# Patient Record
Sex: FEMALE | Race: WHITE | NOT HISPANIC OR LATINO | Employment: OTHER | URBAN - NONMETROPOLITAN AREA
[De-identification: names, ages, dates, MRNs, and addresses within clinical notes are randomized per-mention and may not be internally consistent; named-entity substitution may affect disease eponyms.]

---

## 2018-04-30 RX ORDER — DULOXETIN HYDROCHLORIDE 30 MG/1
30 CAPSULE, DELAYED RELEASE ORAL DAILY
COMMUNITY
End: 2022-09-26

## 2018-05-07 ENCOUNTER — TELEPHONE (OUTPATIENT)
Dept: VASCULAR SURGERY | Facility: CLINIC | Age: 73
End: 2018-05-07

## 2018-05-07 NOTE — TELEPHONE ENCOUNTER
Spoke with Mr Reese reminding him of Mrs Reese's appointment tomorrow at 1045 am with Dr Ruiz. Mr Reese confirmed she would be here.

## 2018-05-08 ENCOUNTER — OFFICE VISIT (OUTPATIENT)
Dept: VASCULAR SURGERY | Facility: CLINIC | Age: 73
End: 2018-05-08

## 2018-05-08 VITALS
SYSTOLIC BLOOD PRESSURE: 120 MMHG | BODY MASS INDEX: 22.44 KG/M2 | WEIGHT: 143 LBS | HEIGHT: 67 IN | HEART RATE: 55 BPM | DIASTOLIC BLOOD PRESSURE: 78 MMHG

## 2018-05-08 DIAGNOSIS — I87.2 VENOUS (PERIPHERAL) INSUFFICIENCY: Primary | ICD-10-CM

## 2018-05-08 DIAGNOSIS — M79.671 RIGHT FOOT PAIN: ICD-10-CM

## 2018-05-08 DIAGNOSIS — R25.2 LEG CRAMPS: ICD-10-CM

## 2018-05-08 PROCEDURE — 99203 OFFICE O/P NEW LOW 30 MIN: CPT | Performed by: SURGERY

## 2018-05-08 RX ORDER — ESTRADIOL 1 MG/1
1 TABLET ORAL
COMMUNITY
Start: 2017-09-20 | End: 2018-07-02 | Stop reason: SDUPTHER

## 2018-05-08 RX ORDER — ASPIRIN 81 MG/1
81 TABLET ORAL DAILY
COMMUNITY
End: 2023-01-11 | Stop reason: HOSPADM

## 2018-05-08 RX ORDER — ESTRADIOL 1 MG/1
1 TABLET ORAL DAILY
COMMUNITY
End: 2022-09-26

## 2018-05-08 RX ORDER — LEVOTHYROXINE SODIUM 0.07 MG/1
75 TABLET ORAL DAILY
COMMUNITY

## 2018-05-08 NOTE — PROGRESS NOTES
05/08/2018      Betsy Montes, APRN  [address] [address]    Marily Reese  1945    Chief Complaint   Patient presents with   • Lower Extremity Issue     Foot pain and foot drop to right foot.       Dear Betsy Roberson*:      HPI  I had the pleasure of seeing your patient Marily Reese in the office today.  Thank you kindly for this consultation.  As you recall, Marily Reese is a 72 y.o.  female who you are currently following for routine health maintenance.  She is having complaints of leg cramping and pain in her feet.  This has been present for five years.  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  She has always walked with a limp, but this has worsened.  She is complaining of foot pain on the right.  She does have complaints of cramping to her legs at night.    Past Medical History:   Diagnosis Date   • Anxiety    • Dementia    • Depression    • Foot pain, bilateral    • History of coma    • History of traumatic head injury    • Hypothyroidism    • Leg cramps    • Unsteadiness on feet        Past Surgical History:   Procedure Laterality Date   • CATARACT EXTRACTION     • EXPLORATORY LAPAROTOMY     • HYSTERECTOMY         Family History   Problem Relation Age of Onset   • Hypertension Mother    • Stroke Mother    • Coronary artery disease Mother        Social History     Social History   • Marital status:      Spouse name: N/A   • Number of children: N/A   • Years of education: N/A     Occupational History   • Not on file.     Social History Main Topics   • Smoking status: Never Smoker   • Smokeless tobacco: Never Used   • Alcohol use Yes      Comment: Occasional   • Drug use: No   • Sexual activity: Not on file     Other Topics Concern   • Not on file     Social History Narrative   • No narrative on file       Allergies   Allergen Reactions   • Demerol [Meperidine] Unknown (See Comments)     Unknown       Prior to Admission  "medications    Medication Sig Start Date End Date Taking? Authorizing Provider   aspirin 81 MG EC tablet Take 81 mg by mouth Daily.   Yes Historical Provider, MD   Calcium Carbonate-Vitamin D (CALCIUM-D PO) Take  by mouth 2 (Two) Times a Day.   Yes Historical Provider, MD   Cholecalciferol (VITAMIN D3) 2000 units chewable tablet Chew.   Yes Historical Provider, MD   DULoxetine (CYMBALTA) 30 MG capsule Take 30 mg by mouth Daily.   Yes Historical Provider, MD   estradiol (ESTRACE) 1 MG tablet Take 1 mg by mouth Daily.   Yes Historical Provider, MD   estradiol (ESTRACE) 1 MG tablet Take 1 mg by mouth. 9/20/17  Yes Historical Provider, MD   levothyroxine (SYNTHROID, LEVOTHROID) 75 MCG tablet Take 75 mcg by mouth Daily.   Yes Historical Provider, MD   NON FORMULARY 2 (Two) Times a Day. Colrys 0.6 mg  5/8/18 Yes Historical Provider, MD       Review of Systems   Constitutional: Negative.    HENT: Negative.    Eyes: Negative.    Respiratory: Negative.    Cardiovascular: Negative.         Varicose veins to bilateral lower extremities   Gastrointestinal: Negative.    Endocrine: Negative.    Genitourinary: Negative.    Musculoskeletal: Positive for gait problem.        Right ankle and foot pain   Skin: Negative.    Allergic/Immunologic: Negative.    Hematological: Negative.    Psychiatric/Behavioral: Negative.        /78   Pulse 55   Ht 170.2 cm (67\")   Wt 64.9 kg (143 lb)   BMI 22.40 kg/m²   Physical Exam   Constitutional: She is oriented to person, place, and time. She appears well-developed and well-nourished. No distress.   HENT:   Head: Normocephalic and atraumatic.   Mouth/Throat: Oropharynx is clear and moist.   Eyes: Pupils are equal, round, and reactive to light. No scleral icterus.   Neck: Normal range of motion. Neck supple. No JVD present. Carotid bruit is not present. No thyromegaly present.   Cardiovascular: Normal rate, regular rhythm, S2 normal, normal heart sounds, intact distal pulses and normal " pulses.  Exam reveals no gallop and no friction rub.    No murmur heard.  Varicose veins to bilateral lower extremities   Pulmonary/Chest: Effort normal and breath sounds normal.   Abdominal: Soft. Normal aorta and bowel sounds are normal. There is no hepatosplenomegaly.   Musculoskeletal: Normal range of motion.   Neurological: She is alert and oriented to person, place, and time. No cranial nerve deficit.   Skin: Skin is warm and dry. She is not diaphoretic.   Psychiatric: She has a normal mood and affect. Her behavior is normal. Judgment and thought content normal.   Nursing note and vitals reviewed.          Patient Active Problem List   Diagnosis   • Leg cramps         ICD-10-CM ICD-9-CM   1. Venous (peripheral) insufficiency I87.2 459.81   2. Right foot pain M79.671 729.5   3. Leg cramps R25.2 729.82           Plan: After thoroughly evaluating Marily Reese, I believe the best course of action is to Initially remain conservative from a vascular surgery standpoint.  She does have evidence of venous insufficiency in which I think we can help.  I did give her a prescription for compression stockings to wear on a daily basis.  We instructed her on how to wear them as well.  I will see her back in 3 months time with a 2 leg VVI to see if she is a candidate for a venous closure procedure which could help with some of her symptoms.  With regards to her ankle pain I would like for her to see Dr. Escobar for evaluation.  The patient can continue taking her current medication regimen as previously planned.  This was all discussed in full with complete understanding.    Thank you for allowing me to participate in the care of your patient.  Please do not hesitate with any questions or concerns.  I will keep you aware of any further encounters with Marily Reese.        Sincerely yours,         Mp Ruiz, DO    Louie Huntley, DO

## 2018-06-28 ENCOUNTER — TELEPHONE (OUTPATIENT)
Dept: PODIATRY | Facility: CLINIC | Age: 73
End: 2018-06-28

## 2018-06-28 NOTE — TELEPHONE ENCOUNTER
Called and left message with Mr. Reese reminding him of patient's appointment with Dr. Joey Escobar on 07/02/2018 at 9:45 am. Mr. Reese gave verbal confirmation of his wife's appointment at this time.

## 2018-07-02 ENCOUNTER — HOSPITAL ENCOUNTER (OUTPATIENT)
Dept: GENERAL RADIOLOGY | Facility: HOSPITAL | Age: 73
Discharge: HOME | End: 2018-07-02
Attending: PODIATRIST | Admitting: PODIATRIST

## 2018-07-02 ENCOUNTER — HOSPITAL ENCOUNTER (OUTPATIENT)
Dept: GENERAL RADIOLOGY | Facility: HOSPITAL | Age: 73
Discharge: HOME | End: 2018-07-02
Attending: PODIATRIST

## 2018-07-02 ENCOUNTER — OFFICE VISIT (OUTPATIENT)
Dept: PODIATRY | Facility: CLINIC | Age: 73
End: 2018-07-02

## 2018-07-02 VITALS
DIASTOLIC BLOOD PRESSURE: 60 MMHG | HEART RATE: 78 BPM | HEIGHT: 67 IN | WEIGHT: 143 LBS | SYSTOLIC BLOOD PRESSURE: 114 MMHG | OXYGEN SATURATION: 92 % | BODY MASS INDEX: 22.44 KG/M2

## 2018-07-02 DIAGNOSIS — M79.671 FOOT PAIN, RIGHT: ICD-10-CM

## 2018-07-02 DIAGNOSIS — M21.371 FOOT DROP, RIGHT: Primary | ICD-10-CM

## 2018-07-02 PROCEDURE — 73610 X-RAY EXAM OF ANKLE: CPT

## 2018-07-02 PROCEDURE — 99203 OFFICE O/P NEW LOW 30 MIN: CPT | Performed by: PODIATRIST

## 2018-07-02 PROCEDURE — 73630 X-RAY EXAM OF FOOT: CPT

## 2018-07-03 ENCOUNTER — TELEPHONE (OUTPATIENT)
Dept: PODIATRY | Facility: CLINIC | Age: 73
End: 2018-07-03

## 2018-07-03 NOTE — TELEPHONE ENCOUNTER
Called and discussed XR results with Ms. Marily Reese. Patient gave verbal understanding at this time and was reminded to call the office if pain and/or symptoms worsen or fail to improve. Reminded patient of appointment and testing next month with Saida FLOREZ.

## 2018-08-08 ENCOUNTER — OFFICE VISIT (OUTPATIENT)
Dept: VASCULAR SURGERY | Facility: CLINIC | Age: 73
End: 2018-08-08

## 2018-08-08 ENCOUNTER — HOSPITAL ENCOUNTER (OUTPATIENT)
Dept: ULTRASOUND IMAGING | Facility: HOSPITAL | Age: 73
Discharge: HOME | End: 2018-08-08
Admitting: NURSE PRACTITIONER

## 2018-08-08 VITALS
DIASTOLIC BLOOD PRESSURE: 78 MMHG | SYSTOLIC BLOOD PRESSURE: 108 MMHG | HEART RATE: 86 BPM | HEIGHT: 68 IN | BODY MASS INDEX: 21.82 KG/M2 | WEIGHT: 144 LBS

## 2018-08-08 DIAGNOSIS — Z79.02 ENCOUNTER FOR MONITORING ANTIPLATELET THERAPY: ICD-10-CM

## 2018-08-08 DIAGNOSIS — Z01.818 PREOP TESTING: ICD-10-CM

## 2018-08-08 DIAGNOSIS — R25.2 LEG CRAMPS: ICD-10-CM

## 2018-08-08 DIAGNOSIS — I87.2 VENOUS INSUFFICIENCY: Primary | ICD-10-CM

## 2018-08-08 DIAGNOSIS — I87.2 VENOUS (PERIPHERAL) INSUFFICIENCY: ICD-10-CM

## 2018-08-08 DIAGNOSIS — Z51.81 ENCOUNTER FOR MONITORING ANTIPLATELET THERAPY: ICD-10-CM

## 2018-08-08 PROCEDURE — 93970 EXTREMITY STUDY: CPT | Performed by: SURGERY

## 2018-08-08 PROCEDURE — 99213 OFFICE O/P EST LOW 20 MIN: CPT | Performed by: NURSE PRACTITIONER

## 2018-08-08 PROCEDURE — 93970 EXTREMITY STUDY: CPT

## 2018-08-08 RX ORDER — COLCHICINE 0.6 MG/1
0.6 TABLET ORAL 2 TIMES DAILY
COMMUNITY
Start: 2015-06-07 | End: 2022-09-26

## 2018-08-08 RX ORDER — DONEPEZIL HYDROCHLORIDE 5 MG/1
TABLET, FILM COATED ORAL
COMMUNITY
Start: 2018-06-18 | End: 2018-10-10

## 2018-08-08 NOTE — PROGRESS NOTES
"08/08/2018       Louie Huntley DO  [facility]   [place] [address]    Marily Reese  1945    Chief Complaint   Patient presents with   • Follow-up     Follow up for VVI results.Complaint of leg cramps.       Dear Louie Huntley DO        HPI  I had the pleasure of seeing your patient Marily Reese in the office today.  As you recall, Marily Reese is a 73 y.o.  female who you are currently following for routine health maintenance.  She is having complaints of leg cramping and pain in her feet.  This has been present for five years.  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  She has always walked with a limp, but this has worsened.  She is complaining of foot pain on the right.  She does have complaints of cramping to her legs at night.  She did have noninvasive testing performed today, which I did review in office.      Review of Systems   Constitutional: Negative.    HENT: Negative.    Eyes: Negative.    Respiratory: Negative.    Cardiovascular: Negative.         Varicose veins to bilateral lower extremities   Gastrointestinal: Negative.    Endocrine: Negative.    Genitourinary: Negative.    Musculoskeletal: Positive for gait problem.        Right ankle and foot pain   Skin: Negative.    Allergic/Immunologic: Negative.    Hematological: Negative.    Psychiatric/Behavioral: Negative.        /78   Pulse 86   Ht 171.5 cm (67.5\")   Wt 65.3 kg (144 lb)   BMI 22.22 kg/m²   Physical Exam   Constitutional: She is oriented to person, place, and time. She appears well-developed and well-nourished. No distress.   HENT:   Head: Normocephalic and atraumatic.   Mouth/Throat: Oropharynx is clear and moist.   Eyes: Pupils are equal, round, and reactive to light. No scleral icterus.   Neck: Normal range of motion. Neck supple. No JVD present. Carotid bruit is not present. No thyromegaly present.   Cardiovascular: Normal rate, regular rhythm, S2 normal, normal " heart sounds, intact distal pulses and normal pulses.  Exam reveals no gallop and no friction rub.    No murmur heard.  Varicose veins to bilateral lower extremities   Pulmonary/Chest: Effort normal and breath sounds normal.   Abdominal: Soft. Normal aorta and bowel sounds are normal. There is no hepatosplenomegaly.   Musculoskeletal: Normal range of motion.   Neurological: She is alert and oriented to person, place, and time. No cranial nerve deficit.   Skin: Skin is warm and dry. She is not diaphoretic.   Psychiatric: She has a normal mood and affect. Her behavior is normal. Judgment and thought content normal.   Nursing note and vitals reviewed.    Us Venous Doppler Lower Extremity Bilateral (duplex)    Result Date: 8/8/2018  Narrative: History: Swelling   Comments: Venous valvular insufficiency testing was performed in both legs using duplex ultrasound with rapid cuff deflation technique.  The common femoral veins, popliteal veins, posterior tibial veins, greater saphenous veins, and lesser saphenous veins were interrogated bilaterally.  On the right, the greater saphenous vein at the junction measured 5 mm. In the mid thigh measured 3.6 cm. Above the knee measured 3.3 mm. In the mid calf measured 2.5 mm. At the ankle measured 2.9 mm. There is greater than 0.5 seconds of reflux from the proximal thigh to the ankle. The lesser saphenous vein is within normal limits and no evidence of reflux. There is no evidence of DVT.  On the left, the greater saphenous vein at the junction measured 2.9 mm. In the mid thigh measured 2.8 mm. Above the knee measured 2.2 mm. In the mid calf measured 1.9 mm. At the ankle measured 2 mm. There is greater than 0.5 seconds of reflux at the ankle only. Lesser saphenous vein is within normal limits and no evidence of reflux. There is no evidence of DVT.      Impression: Impression: There is evidence of venous insufficiency in both lower extremity greater saphenous veins right greater  than left.    This report was finalized on 08/08/2018 17:19 by Dr. Mp Ruiz MD.           Patient Active Problem List   Diagnosis   • Leg cramps   • Foot drop, right         ICD-10-CM ICD-9-CM   1. Venous insufficiency I87.2 459.81   2. Leg cramps R25.2 729.82         Plan: After thoroughly evaluating Marily Reese, I believe the best course of action is to proceed with a radiofrequency ablation of the right greater saphenous vein.  Risks of radiofrequency ablation include, but are not limited to, bleeding, infection, vessel damage, nerve damage, DVT, phlebitis, and pulmonary embolus.  The patient understands these risks and wishes to proceed with procedure.  I would like her to continue with the compression stockings  on a daily basis.       The patient can continue taking her current medication regimen as previously planned.  This was all discussed in full with complete understanding.    Thank you for allowing me to participate in the care of your patient.  Please do not hesitate with any questions or concerns.  I will keep you aware of any further encounters with Marily Reese.        Sincerely yours,         Saida Reddy, VIKKI Huntley, Louie Broderick, DO

## 2018-08-09 ENCOUNTER — TELEPHONE (OUTPATIENT)
Dept: VASCULAR SURGERY | Facility: CLINIC | Age: 73
End: 2018-08-09

## 2018-08-09 PROBLEM — Z01.818 PREOP TESTING: Status: ACTIVE | Noted: 2018-08-09

## 2018-08-09 PROBLEM — I87.2 VENOUS INSUFFICIENCY: Status: ACTIVE | Noted: 2018-08-09

## 2018-08-09 NOTE — TELEPHONE ENCOUNTER
I called patient to give her surgical instructions. Her pre work is scheduled for 8/17/18 with  An arrival time of 9:45 am. Her procedure is scheduled for 8/24/18 with an arrival time of 6 am.

## 2018-08-09 NOTE — H&P
08/08/2018       Louie Huntley DO  [facility]   [place] [address]    Marily Reese  1945    Chief Complaint   Patient presents with   • Follow-up     Follow up for VVI results.Complaint of leg cramps.       Dear Louie Huntley,         HPI  I had the pleasure of seeing your patient Marily Reese in the office today.  As you recall, Marily Reese is a 73 y.o.  female who you are currently following for routine health maintenance.  She is having complaints of leg cramping and pain in her feet.  This has been present for five years.  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  She has always walked with a limp, but this has worsened.  She is complaining of foot pain on the right.  She does have complaints of cramping to her legs at night.  She did have noninvasive testing performed today, which I did review in office.    Past Medical History:   Diagnosis Date   • Anxiety    • Dementia    • Depression    • Foot pain, bilateral    • History of coma    • History of traumatic head injury    • Hypothyroidism    • Leg cramps    • Unsteadiness on feet      Past Surgical History:   Procedure Laterality Date   • CATARACT EXTRACTION     • EXPLORATORY LAPAROTOMY     • HYSTERECTOMY       Family History   Problem Relation Age of Onset   • Hypertension Mother    • Stroke Mother    • Coronary artery disease Mother      Social History   Substance Use Topics   • Smoking status: Never Smoker   • Smokeless tobacco: Never Used   • Alcohol use Yes      Comment: Occasional     Allergies   Allergen Reactions   • Demerol [Meperidine] Unknown (See Comments)     Unknown       Current Outpatient Prescriptions:   •  aspirin 81 MG EC tablet, Take 81 mg by mouth Daily., Disp: , Rfl:   •  Calcium Carbonate-Vitamin D (CALCIUM-D PO), Take  by mouth 2 (Two) Times a Day., Disp: , Rfl:   •  Cholecalciferol (VITAMIN D3) 2000 units chewable tablet, Chew., Disp: , Rfl:   •  colchicine (COLCRYS)  "0.6 MG tablet, , Disp: , Rfl:   •  donepezil (ARICEPT) 5 MG tablet, , Disp: , Rfl:   •  DULoxetine (CYMBALTA) 30 MG capsule, Take 30 mg by mouth Daily., Disp: , Rfl:   •  estradiol (ESTRACE) 1 MG tablet, Take 1 mg by mouth Daily., Disp: , Rfl:   •  levothyroxine (SYNTHROID, LEVOTHROID) 75 MCG tablet, Take 75 mcg by mouth Daily., Disp: , Rfl:       Review of Systems   Constitutional: Negative.    HENT: Negative.    Eyes: Negative.    Respiratory: Negative.    Cardiovascular: Negative.         Varicose veins to bilateral lower extremities   Gastrointestinal: Negative.    Endocrine: Negative.    Genitourinary: Negative.    Musculoskeletal: Positive for gait problem.        Right ankle and foot pain   Skin: Negative.    Allergic/Immunologic: Negative.    Hematological: Negative.    Psychiatric/Behavioral: Negative.        /78   Pulse 86   Ht 171.5 cm (67.5\")   Wt 65.3 kg (144 lb)   BMI 22.22 kg/m²   Physical Exam   Constitutional: She is oriented to person, place, and time. She appears well-developed and well-nourished. No distress.   HENT:   Head: Normocephalic and atraumatic.   Mouth/Throat: Oropharynx is clear and moist.   Eyes: Pupils are equal, round, and reactive to light. No scleral icterus.   Neck: Normal range of motion. Neck supple. No JVD present. Carotid bruit is not present. No thyromegaly present.   Cardiovascular: Normal rate, regular rhythm, S2 normal, normal heart sounds, intact distal pulses and normal pulses.  Exam reveals no gallop and no friction rub.    No murmur heard.  Varicose veins to bilateral lower extremities   Pulmonary/Chest: Effort normal and breath sounds normal.   Abdominal: Soft. Normal aorta and bowel sounds are normal. There is no hepatosplenomegaly.   Musculoskeletal: Normal range of motion.   Neurological: She is alert and oriented to person, place, and time. No cranial nerve deficit.   Skin: Skin is warm and dry. She is not diaphoretic.   Psychiatric: She has a normal " mood and affect. Her behavior is normal. Judgment and thought content normal.   Nursing note and vitals reviewed.    Us Venous Doppler Lower Extremity Bilateral (duplex)    Result Date: 8/8/2018  Narrative: History: Swelling   Comments: Venous valvular insufficiency testing was performed in both legs using duplex ultrasound with rapid cuff deflation technique.  The common femoral veins, popliteal veins, posterior tibial veins, greater saphenous veins, and lesser saphenous veins were interrogated bilaterally.  On the right, the greater saphenous vein at the junction measured 5 mm. In the mid thigh measured 3.6 cm. Above the knee measured 3.3 mm. In the mid calf measured 2.5 mm. At the ankle measured 2.9 mm. There is greater than 0.5 seconds of reflux from the proximal thigh to the ankle. The lesser saphenous vein is within normal limits and no evidence of reflux. There is no evidence of DVT.  On the left, the greater saphenous vein at the junction measured 2.9 mm. In the mid thigh measured 2.8 mm. Above the knee measured 2.2 mm. In the mid calf measured 1.9 mm. At the ankle measured 2 mm. There is greater than 0.5 seconds of reflux at the ankle only. Lesser saphenous vein is within normal limits and no evidence of reflux. There is no evidence of DVT.      Impression: Impression: There is evidence of venous insufficiency in both lower extremity greater saphenous veins right greater than left.    This report was finalized on 08/08/2018 17:19 by Dr. Mp Ruiz MD.           Patient Active Problem List   Diagnosis   • Leg cramps   • Foot drop, right         ICD-10-CM ICD-9-CM   1. Venous insufficiency I87.2 459.81   2. Leg cramps R25.2 729.82         Plan: After thoroughly evaluating Marily Reese, I believe the best course of action is to proceed with a radiofrequency ablation of the right greater saphenous vein.  Risks of radiofrequency ablation include, but are not limited to, bleeding, infection, vessel  damage, nerve damage, DVT, phlebitis, and pulmonary embolus.  The patient understands these risks and wishes to proceed with procedure.  I would like her to continue with the compression stockings  on a daily basis.       The patient can continue taking her current medication regimen as previously planned.  This was all discussed in full with complete understanding.    Thank you for allowing me to participate in the care of your patient.  Please do not hesitate with any questions or concerns.  I will keep you aware of any further encounters with Marily Reese.        Sincerely yours,         Saida Reddy, VIKKI Huntley, Louie Broderick, DO

## 2018-08-17 ENCOUNTER — APPOINTMENT (OUTPATIENT)
Dept: PREADMISSION TESTING | Facility: HOSPITAL | Age: 73
End: 2018-08-17

## 2018-08-22 ENCOUNTER — TELEPHONE (OUTPATIENT)
Dept: VASCULAR SURGERY | Facility: CLINIC | Age: 73
End: 2018-08-22
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
## 2018-10-01 ENCOUNTER — TELEPHONE (OUTPATIENT)
Dept: VASCULAR SURGERY | Facility: CLINIC | Age: 73
End: 2018-10-01

## 2018-10-01 NOTE — TELEPHONE ENCOUNTER
Spoke with Ms Reese reminding her of her appointment for Tuesday, October 2nd, 2018 at 1045 am with Dr Mp Ruiz. Ms Reese confirmed she would be here.

## 2018-10-02 ENCOUNTER — OFFICE VISIT (OUTPATIENT)
Dept: VASCULAR SURGERY | Facility: CLINIC | Age: 73
End: 2018-10-02

## 2018-10-02 VITALS
HEART RATE: 68 BPM | SYSTOLIC BLOOD PRESSURE: 122 MMHG | OXYGEN SATURATION: 96 % | BODY MASS INDEX: 22.28 KG/M2 | HEIGHT: 68 IN | WEIGHT: 147 LBS | DIASTOLIC BLOOD PRESSURE: 68 MMHG

## 2018-10-02 DIAGNOSIS — R25.2 LEG CRAMPS: ICD-10-CM

## 2018-10-02 DIAGNOSIS — Z01.818 PREOP TESTING: ICD-10-CM

## 2018-10-02 DIAGNOSIS — Z79.02 ENCOUNTER FOR MONITORING ANTIPLATELET THERAPY: ICD-10-CM

## 2018-10-02 DIAGNOSIS — I87.2 VENOUS INSUFFICIENCY: Primary | ICD-10-CM

## 2018-10-02 DIAGNOSIS — Z51.81 ENCOUNTER FOR MONITORING ANTIPLATELET THERAPY: ICD-10-CM

## 2018-10-02 PROCEDURE — 99213 OFFICE O/P EST LOW 20 MIN: CPT | Performed by: NURSE PRACTITIONER
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
## 2018-10-10 ENCOUNTER — APPOINTMENT (OUTPATIENT)
Dept: PREADMISSION TESTING | Facility: HOSPITAL | Age: 73
End: 2018-10-10

## 2018-10-10 ENCOUNTER — HOSPITAL ENCOUNTER (OUTPATIENT)
Dept: GENERAL RADIOLOGY | Facility: HOSPITAL | Age: 73
Discharge: HOME | End: 2018-10-10
Admitting: NURSE PRACTITIONER

## 2018-10-10 VITALS
DIASTOLIC BLOOD PRESSURE: 56 MMHG | OXYGEN SATURATION: 94 % | WEIGHT: 149.25 LBS | SYSTOLIC BLOOD PRESSURE: 115 MMHG | BODY MASS INDEX: 22.62 KG/M2 | HEART RATE: 77 BPM | RESPIRATION RATE: 16 BRPM | HEIGHT: 68 IN

## 2018-10-10 DIAGNOSIS — Z01.818 PREOP TESTING: ICD-10-CM

## 2018-10-10 DIAGNOSIS — Z51.81 ENCOUNTER FOR MONITORING ANTIPLATELET THERAPY: ICD-10-CM

## 2018-10-10 DIAGNOSIS — I87.2 VENOUS INSUFFICIENCY: ICD-10-CM

## 2018-10-10 DIAGNOSIS — Z79.02 ENCOUNTER FOR MONITORING ANTIPLATELET THERAPY: ICD-10-CM

## 2018-10-10 LAB
ANION GAP SERPL CALCULATED.3IONS-SCNC: 9 MMOL/L (ref 4–13)
APTT PPP: 28.9 SECONDS (ref 24.1–34.8)
BASOPHILS # BLD AUTO: 0.05 10*3/MM3 (ref 0–0.2)
BASOPHILS NFR BLD AUTO: 1.2 % (ref 0–2)
BUN BLD-MCNC: 9 MG/DL (ref 5–21)
BUN/CREAT SERPL: 14.1 (ref 7–25)
CALCIUM SPEC-SCNC: 9.1 MG/DL (ref 8.4–10.4)
CHLORIDE SERPL-SCNC: 103 MMOL/L (ref 98–110)
CO2 SERPL-SCNC: 30 MMOL/L (ref 24–31)
CREAT BLD-MCNC: 0.64 MG/DL (ref 0.5–1.4)
DEPRECATED RDW RBC AUTO: 45.1 FL (ref 40–54)
EOSINOPHIL # BLD AUTO: 0.21 10*3/MM3 (ref 0–0.7)
EOSINOPHIL NFR BLD AUTO: 5 % (ref 0–4)
ERYTHROCYTE [DISTWIDTH] IN BLOOD BY AUTOMATED COUNT: 13.2 % (ref 12–15)
GFR SERPL CREATININE-BSD FRML MDRD: 91 ML/MIN/1.73
GLUCOSE BLD-MCNC: 109 MG/DL (ref 70–100)
HCT VFR BLD AUTO: 34.9 % (ref 37–47)
HGB BLD-MCNC: 11.5 G/DL (ref 12–16)
INR PPP: 1.07 (ref 0.91–1.09)
LYMPHOCYTES # BLD AUTO: 1.96 10*3/MM3 (ref 0.72–4.86)
LYMPHOCYTES NFR BLD AUTO: 46.3 % (ref 15–45)
MCH RBC QN AUTO: 30.6 PG (ref 28–32)
MCHC RBC AUTO-ENTMCNC: 33 G/DL (ref 33–36)
MCV RBC AUTO: 92.8 FL (ref 82–98)
MONOCYTES # BLD AUTO: 0.32 10*3/MM3 (ref 0.19–1.3)
MONOCYTES NFR BLD AUTO: 7.6 % (ref 4–12)
NEUTROPHILS # BLD AUTO: 1.68 10*3/MM3 (ref 1.87–8.4)
NEUTROPHILS NFR BLD AUTO: 39.7 % (ref 39–78)
PLATELET # BLD AUTO: 106 10*3/MM3 (ref 130–400)
PMV BLD AUTO: 13.2 FL (ref 6–12)
POTASSIUM BLD-SCNC: 3.9 MMOL/L (ref 3.5–5.3)
PROTHROMBIN TIME: 14.2 SECONDS (ref 11.9–14.6)
RBC # BLD AUTO: 3.76 10*6/MM3 (ref 4.2–5.4)
SODIUM BLD-SCNC: 142 MMOL/L (ref 135–145)
WBC NRBC COR # BLD: 4.23 10*3/MM3 (ref 4.8–10.8)

## 2018-10-10 PROCEDURE — 71046 X-RAY EXAM CHEST 2 VIEWS: CPT

## 2018-10-10 PROCEDURE — 93010 ELECTROCARDIOGRAM REPORT: CPT | Performed by: INTERNAL MEDICINE

## 2018-10-10 PROCEDURE — 85730 THROMBOPLASTIN TIME PARTIAL: CPT | Performed by: NURSE PRACTITIONER

## 2018-10-10 PROCEDURE — 85025 COMPLETE CBC W/AUTO DIFF WBC: CPT | Performed by: NURSE PRACTITIONER

## 2018-10-10 PROCEDURE — 36415 COLL VENOUS BLD VENIPUNCTURE: CPT

## 2018-10-10 PROCEDURE — 93005 ELECTROCARDIOGRAM TRACING: CPT

## 2018-10-10 PROCEDURE — 80048 BASIC METABOLIC PNL TOTAL CA: CPT | Performed by: NURSE PRACTITIONER

## 2018-10-10 PROCEDURE — 85610 PROTHROMBIN TIME: CPT | Performed by: NURSE PRACTITIONER

## 2018-10-10 NOTE — DISCHARGE INSTRUCTIONS
DAY OF SURGERY INSTRUCTIONS        YOUR SURGEON: TARI MOORE    PROCEDURE: RIGHT SAPHENOUS VEIN RADIO FREQUENCY ABLATION    DATE OF SURGERY: October 17, 2018    ARRIVAL TIME: AS DIRECTED BY OFFICE    DAY OF SURGERY TAKE ONLY THESE MEDICATIONS UNLESS OTHERWISE INSTRUCTED BY YOUR PHYSICIAN: NONE        MANAGING PAIN AFTER SURGERY    We know you are probably wondering what your pain will be like after surgery.  Following surgery it is unrealistic to expect you will not have pain.   Pain is how our bodies let us know that something is wrong or cautions us to be careful.  That said, our goal is to make your pain tolerable.    Methods we may use to treat your pain include (oral or IV medications, PCAs, epidurals, nerve blocks, etc.)   While some procedures require IV pain medications for a short time after surgery, transitioning to pain medications by mouth allows for better management of pain.   Your nurse will encourage you to take oral pain medications whenever possible.  IV medications work almost immediately, but only last a short while.  Taking medications by mouth allows for a more constant level of medication in your blood stream for a longer period of time.      Once your pain is out of control it is harder to get back under control.  It is important you are aware when your next dose of pain medication is due.  If you are admitted, your nurse may write the time of your next dose on the white board in your room to help you remember.      We are interested in your pain and encourage you to inform us about aggravating factors during your visit.   Many times a simple repositioning every few hours can make a big difference.    If your physician says it is okay, do not let your pain prevent you from getting out of bed. Be sure to call your nurse for assistance prior to getting up so you do not fall.      Before surgery, please decide your tolerable pain goal.  These faces help describe the pain ratings we use on  a 0-10 scale.   Be prepared to tell us your goal and whether or not you take pain or anxiety medications at home.          BEFORE YOU COME TO THE HOSPITAL  (Pre-op instructions)  • Do not eat, drink, smoke or chew gum after midnight the night before surgery.  This also includes no mints.  • Morning of surgery take only the medicines you have been instructed with a sip of water unless otherwise instructed  by your physician.  • Do not shave, wear makeup or dark nail polish.  • Remove all jewelry including rings.  • Leave anything you consider valuable at home.  • Leave your suitcase in the car until after your surgery.  • Bring the following with you if applicable:  o Picture ID and insurance, Medicare or Medicaid cards  o Co-pay/deductible required by insurance (cash, check, credit card)  o Copy of advance directive, living will or power-of- documents if not brought to PAT  o CPAP or BIPAP mask and tubing  o Relaxation aids (MP3 player, book, magazine)  • On the day of surgery check in at registration located at the main entrance of the hospital.       Outpatient Surgery Guidelines, Adult  Outpatient procedures are those for which the person having the procedure is allowed to go home the same day as the procedure. Various procedures are done on an outpatient basis. You should follow some general guidelines if you will be having an outpatient procedure.  LET YOUR HEALTH CARE PROVIDER KNOW ABOUT:  · Any allergies you have.  · All medicines you are taking, including vitamins, herbs, eye drops, creams, and over-the-counter medicines.  · Previous problems you or members of your family have had with the use of anesthetics.  · Any blood disorders you have.  · Previous surgeries you have had.  · Medical conditions you have.  RISKS AND COMPLICATIONS  Your health care provider will discuss possible risks and complications with you before surgery. Common risks and complications include:    · Problems due to the use of  anesthetics.  · Blood loss and replacement (does not apply to minor surgical procedures).  · Temporary increase in pain due to surgery.  · Uncorrected pain or problems that the surgery was meant to correct.  · Infection.  · New damage.  BEFORE THE PROCEDURE  · Ask your health care provider about changing or stopping your regular medicines. You may need to stop taking certain medicines in the days or weeks before the procedure.  · Stop smoking at least 2 weeks before surgery. This lowers your risk for complications during and after surgery. Ask your health care provider for help with this if needed.  · Eat your usual meals and a light supper the day before surgery. Continue fluid intake. Do not drink alcohol.  · Do not eat or drink after midnight the night before your surgery.   · Arrange for someone to take you home and to stay with you for 24 hours after the procedure. Medicine given for your procedure may affect your ability to drive or to care for yourself.  · Call your health care provider's office if you develop an illness or problem that may prevent you from safely having your procedure.  AFTER THE PROCEDURE  After surgery, you will be taken to a recovery area, where your progress will be monitored. If there are no complications, you will be allowed to go home when you are awake, stable, and taking fluids well. You may have numbness around the surgical site. Healing will take some time. You will have tenderness at the surgical site and may have some swelling and bruising. You may also have some nausea.  HOME CARE INSTRUCTIONS  · Do not drive for 24 hours, or as directed by your health care provider. Do not drive while taking prescription pain medicines.  · Do not drink alcohol for 24 hours.  · Do not make important decisions or sign legal documents for 24 hours.  · You may resume a normal diet and activities as directed.  · Do not lift anything heavier than 10 pounds (4.5 kg) or play contact sports until your  health care provider says it is okay.  · Change your bandages (dressings) as directed.  · Only take over-the-counter or prescription medicines as directed by your health care provider.  · Follow up with your health care provider as directed.  SEEK MEDICAL CARE IF:  · You have increased bleeding (more than a small spot) from the surgical site.  · You have redness, swelling, or increasing pain in the wound.  · You see pus coming from the wound.  · You have a fever.  · You notice a bad smell coming from the wound or dressing.  · You feel lightheaded or faint.  · You develop a rash.  · You have trouble breathing.  · You develop allergies.  MAKE SURE YOU:  · Understand these instructions.  · Will watch your condition.  · Will get help right away if you are not doing well or get worse.     This information is not intended to replace advice given to you by your health care provider. Make sure you discuss any questions you have with your health care provider.     Document Released: 09/12/2002 Document Revised: 05/03/2016 Document Reviewed: 05/22/2014  [employer] Interactive Patient Education ©2016 [employer]       Fall Prevention in Hospitals, Adult  As a hospital patient, your condition and the treatments you receive can increase your risk for falls. Some additional risk factors for falls in a hospital include:  · Being in an unfamiliar environment.  · Being on bed rest.  · Your surgery.  · Taking certain medicines.  · Your tubing requirements, such as intravenous (IV) therapy or catheters.  It is important that you learn how to decrease fall risks while at the hospital. Below are important tips that can help prevent falls.  SAFETY TIPS FOR PREVENTING FALLS  Talk about your risk of falling.  · Ask your health care provider why you are at risk for falling. Is it your medicine, illness, tubing placement, or something else?  · Make a plan with your health care provider to keep you safe from falls.  · Ask your health care  provider or pharmacist about side effects of your medicines. Some medicines can make you dizzy or affect your coordination.  Ask for help.  · Ask for help before getting out of bed. You may need to press your call button.  · Ask for assistance in getting safely to the toilet.  · Ask for a walker or cane to be put at your bedside. Ask that most of the side rails on your bed be placed up before your health care provider leaves the room.  · Ask family or friends to sit with you.  · Ask for things that are out of your reach, such as your glasses, hearing aids, telephone, bedside table, or call button.  Follow these tips to avoid falling:  · Stay lying or seated, rather than standing, while waiting for help.  · Wear rubber-soled slippers or shoes whenever you walk in the hospital.  · Avoid quick, sudden movements.  ¨ Change positions slowly.  ¨ Sit on the side of your bed before standing.  ¨ Stand up slowly and wait before you start to walk.  · Let your health care provider know if there is a spill on the floor.  · Pay careful attention to the medical equipment, electrical cords, and tubes around you.  · When you need help, use your call button by your bed or in the bathroom. Wait for one of your health care providers to help you.  · If you feel dizzy or unsure of your footing, return to bed and wait for assistance.  · Avoid being distracted by the TV, telephone, or another person in your room.  · Do not lean or support yourself on rolling objects, such as IV poles or bedside tables.     This information is not intended to replace advice given to you by your health care provider. Make sure you discuss any questions you have with your health care provider.     Document Released: 12/15/2001 Document Revised: 01/08/2016 Document Reviewed: 08/25/2013  [employer] Interactive Patient Education ©2016 [employer]       Surgical Site Infections FAQs  What is a Surgical Site Infection (SSI)?  A surgical site infection is an  infection that occurs after surgery in the part of the body where the surgery took place. Most patients who have surgery do not develop an infection. However, infections develop in about 1 to 3 out of every 100 patients who have surgery.  Some of the common symptoms of a surgical site infection are:  · Redness and pain around the area where you had surgery  · Drainage of cloudy fluid from your surgical wound  · Fever  Can SSIs be treated?  Yes. Most surgical site infections can be treated with antibiotics. The antibiotic given to you depends on the bacteria (germs) causing the infection. Sometimes patients with SSIs also need another surgery to treat the infection.  What are some of the things that hospitals are doing to prevent SSIs?  To prevent SSIs, doctors, nurses, and other healthcare providers:  · Clean their hands and arms up to their elbows with an antiseptic agent just before the surgery.  · Clean their hands with soap and water or an alcohol-based hand rub before and after caring for each patient.  · May remove some of your hair immediately before your surgery using electric clippers if the hair is in the same area where the procedure will occur. They should not shave you with a razor.  · Wear special hair covers, masks, gowns, and gloves during surgery to keep the surgery area clean.  · Give you antibiotics before your surgery starts. In most cases, you should get antibiotics within 60 minutes before the surgery starts and the antibiotics should be stopped within 24 hours after surgery.  · Clean the skin at the site of your surgery with a special soap that kills germs.  What can I do to help prevent SSIs?  Before your surgery:  · Tell your doctor about other medical problems you may have. Health problems such as allergies, diabetes, and obesity could affect your surgery and your treatment.  · Quit smoking. Patients who smoke get more infections. Talk to your doctor about how you can quit before your  surgery.  · Do not shave near where you will have surgery. Shaving with a razor can irritate your skin and make it easier to develop an infection.  At the time of your surgery:  · Speak up if someone tries to shave you with a razor before surgery. Ask why you need to be shaved and talk with your surgeon if you have any concerns.  · Ask if you will get antibiotics before surgery.  After your surgery:  · Make sure that your healthcare providers clean their hands before examining you, either with soap and water or an alcohol-based hand rub.  · If you do not see your providers clean their hands, please ask them to do so.  · Family and friends who visit you should not touch the surgical wound or dressings.  · Family and friends should clean their hands with soap and water or an alcohol-based hand rub before and after visiting you. If you do not see them clean their hands, ask them to clean their hands.  What do I need to do when I go home from the hospital?  · Before you go home, your doctor or nurse should explain everything you need to know about taking care of your wound. Make sure you understand how to care for your wound before you leave the hospital.  · Always clean your hands before and after caring for your wound.  · Before you go home, make sure you know who to contact if you have questions or problems after you get home.  · If you have any symptoms of an infection, such as redness and pain at the surgery site, drainage, or fever, call your doctor immediately.  If you have additional questions, please ask your doctor or nurse.  Developed and co-sponsored by The Society for Healthcare Epidemiology of [place] (SHEA); Infectious Diseases Society of [place] (IDSA); American Hospital Association; Association for Professionals in Infection Control and Epidemiology (APIC); Centers for Disease Control and Prevention (CDC); and The Joint Commission.     This information is not intended to replace advice given to you by  your health care provider. Make sure you discuss any questions you have with your health care provider.     Document Released: 12/23/2014 Document Revised: 01/08/2016 Document Reviewed: 03/02/2016  [employer] Interactive Patient Education ©2016 [employer]       [facility]  CHG 4% Patient Instruction Sheet    Preparing the Skin Before Surgery  Preparing or “prepping” skin before surgery can reduce the risk of infection at the surgical site. To make the process easier,[facility] has chosen 4% Chlorhexidine Gluconate (CHG) antiseptic solution.   The steps below outline the prepping process and should be carefully followed.                                                                                                                                                      Prep the skin at the following time(s):                                                      We recommend you shower the night before surgery, and again the morning of surgery with the 4% CHG antiseptic solution using half of the bottle and a cloth each time.  Dress in clean clothes/sleepwear after showering.  See instructions below for application.          Do not apply any lotions or moisturizers.       Do not shave the area to be prepped for at least 2 days prior to surgery.    Clipping the hair may be done immediately prior to your surgery at the hospital    if needed.    Directions:  Thoroughly rinse your body with water.  Apply 4% CHG to a cloth and wash skin gently, paying special attention to the operative site.  Rinse again thoroughly.  Once you have begun using this product do not apply anything else to your skin. If itching or redness persists, rinse affected areas and discontinue use.    When using this product:  • Keep out of eyes, ears, and mouth.  • If solution should contact these areas, rinse out promptly and thoroughly with water.  • For external use only.  • Do not use in genital area, on your face or  head.      PATIENT/FAMILY/RESPONSIBLE PARTY VERBALIZES UNDERSTANDING OF ABOVE EDUCATION.  COPY OF PAIN SCALE GIVEN AND REVIEWED WITH VERBALIZED UNDERSTANDING.

## 2018-10-16 ENCOUNTER — TELEPHONE (OUTPATIENT)
Dept: VASCULAR SURGERY | Facility: CLINIC | Age: 73
End: 2018-10-16

## 2018-10-16 NOTE — TELEPHONE ENCOUNTER
I called the number listed for the patient and Mr. Reese answered the phone. I confirmed the NEW time of arrival for the patient's surgery on 10/17/18 for 5:15am.    He acknowledged the new time and change.

## 2018-10-17 ENCOUNTER — HOSPITAL ENCOUNTER (OUTPATIENT)
Facility: HOSPITAL | Age: 73
Setting detail: HOSPITAL OUTPATIENT SURGERY
Discharge: HOME | End: 2018-10-17
Attending: SURGERY | Admitting: SURGERY

## 2018-10-17 ENCOUNTER — ANESTHESIA EVENT (OUTPATIENT)
Dept: PERIOP | Facility: HOSPITAL | Age: 73
End: 2018-10-17

## 2018-10-17 ENCOUNTER — APPOINTMENT (OUTPATIENT)
Dept: ULTRASOUND IMAGING | Facility: HOSPITAL | Age: 73
End: 2018-10-17

## 2018-10-17 ENCOUNTER — ANESTHESIA (OUTPATIENT)
Dept: PERIOP | Facility: HOSPITAL | Age: 73
End: 2018-10-17

## 2018-10-17 VITALS
DIASTOLIC BLOOD PRESSURE: 57 MMHG | RESPIRATION RATE: 16 BRPM | OXYGEN SATURATION: 96 % | TEMPERATURE: 96.6 F | SYSTOLIC BLOOD PRESSURE: 101 MMHG | HEART RATE: 65 BPM

## 2018-10-17 DIAGNOSIS — Z01.818 PREOP TESTING: ICD-10-CM

## 2018-10-17 DIAGNOSIS — I87.391 CHRONIC VENOUS HYPERTENSION (IDIOPATHIC) WITH OTHER COMPLICATIONS OF RIGHT LOWER EXTREMITY: ICD-10-CM

## 2018-10-17 DIAGNOSIS — I87.2 VENOUS INSUFFICIENCY: ICD-10-CM

## 2018-10-17 LAB
ABO GROUP BLD: NORMAL
BLD GP AB SCN SERPL QL: NEGATIVE
RH BLD: POSITIVE
T&S EXPIRATION DATE: NORMAL

## 2018-10-17 PROCEDURE — C1894 INTRO/SHEATH, NON-LASER: HCPCS | Performed by: SURGERY

## 2018-10-17 PROCEDURE — C1888 ENDOVAS NON-CARDIAC ABL CATH: HCPCS | Performed by: SURGERY

## 2018-10-17 PROCEDURE — 86900 BLOOD TYPING SEROLOGIC ABO: CPT | Performed by: NURSE PRACTITIONER

## 2018-10-17 PROCEDURE — 86850 RBC ANTIBODY SCREEN: CPT | Performed by: NURSE PRACTITIONER

## 2018-10-17 PROCEDURE — 25010000002 PROPOFOL 1000 MG/ML EMULSION: Performed by: NURSE ANESTHETIST, CERTIFIED REGISTERED

## 2018-10-17 PROCEDURE — 76937 US GUIDE VASCULAR ACCESS: CPT

## 2018-10-17 PROCEDURE — 25010000002 MIDAZOLAM PER 1 MG: Performed by: ANESTHESIOLOGY

## 2018-10-17 PROCEDURE — 86901 BLOOD TYPING SEROLOGIC RH(D): CPT | Performed by: NURSE PRACTITIONER

## 2018-10-17 PROCEDURE — 36475 ENDOVENOUS RF 1ST VEIN: CPT | Performed by: SURGERY

## 2018-10-17 RX ORDER — IPRATROPIUM BROMIDE AND ALBUTEROL SULFATE 2.5; .5 MG/3ML; MG/3ML
3 SOLUTION RESPIRATORY (INHALATION) ONCE AS NEEDED
Status: CANCELLED | OUTPATIENT
Start: 2018-10-17

## 2018-10-17 RX ORDER — LIDOCAINE HYDROCHLORIDE 20 MG/ML
INJECTION, SOLUTION INFILTRATION; PERINEURAL AS NEEDED
Status: DISCONTINUED | OUTPATIENT
Start: 2018-10-17 | End: 2018-10-17 | Stop reason: SURG

## 2018-10-17 RX ORDER — BUPIVACAINE HCL/0.9 % NACL/PF 0.1 %
2 PLASTIC BAG, INJECTION (ML) EPIDURAL ONCE
Status: COMPLETED | OUTPATIENT
Start: 2018-10-17 | End: 2018-10-17

## 2018-10-17 RX ORDER — METOCLOPRAMIDE HYDROCHLORIDE 5 MG/ML
5 INJECTION INTRAMUSCULAR; INTRAVENOUS
Status: CANCELLED | OUTPATIENT
Start: 2018-10-17

## 2018-10-17 RX ORDER — SODIUM CHLORIDE 9 MG/ML
INJECTION, SOLUTION INTRAVENOUS AS NEEDED
Status: DISCONTINUED | OUTPATIENT
Start: 2018-10-17 | End: 2018-10-17 | Stop reason: HOSPADM

## 2018-10-17 RX ORDER — FENTANYL CITRATE 50 UG/ML
25 INJECTION, SOLUTION INTRAMUSCULAR; INTRAVENOUS AS NEEDED
Status: CANCELLED | OUTPATIENT
Start: 2018-10-17

## 2018-10-17 RX ORDER — SODIUM CHLORIDE 0.9 % (FLUSH) 0.9 %
3 SYRINGE (ML) INJECTION EVERY 12 HOURS SCHEDULED
Status: DISCONTINUED | OUTPATIENT
Start: 2018-10-17 | End: 2018-10-17 | Stop reason: HOSPADM

## 2018-10-17 RX ORDER — LABETALOL HYDROCHLORIDE 5 MG/ML
5 INJECTION, SOLUTION INTRAVENOUS
Status: CANCELLED | OUTPATIENT
Start: 2018-10-17

## 2018-10-17 RX ORDER — OXYCODONE AND ACETAMINOPHEN 10; 325 MG/1; MG/1
1 TABLET ORAL ONCE AS NEEDED
Status: CANCELLED | OUTPATIENT
Start: 2018-10-17

## 2018-10-17 RX ORDER — MIDAZOLAM HYDROCHLORIDE 1 MG/ML
2 INJECTION INTRAMUSCULAR; INTRAVENOUS
Status: DISCONTINUED | OUTPATIENT
Start: 2018-10-17 | End: 2018-10-17 | Stop reason: HOSPADM

## 2018-10-17 RX ORDER — SODIUM CHLORIDE, SODIUM LACTATE, POTASSIUM CHLORIDE, CALCIUM CHLORIDE 600; 310; 30; 20 MG/100ML; MG/100ML; MG/100ML; MG/100ML
1000 INJECTION, SOLUTION INTRAVENOUS CONTINUOUS
Status: DISCONTINUED | OUTPATIENT
Start: 2018-10-17 | End: 2018-10-17 | Stop reason: HOSPADM

## 2018-10-17 RX ORDER — SUFENTANIL CITRATE 50 UG/ML
INJECTION EPIDURAL; INTRAVENOUS AS NEEDED
Status: DISCONTINUED | OUTPATIENT
Start: 2018-10-17 | End: 2018-10-17 | Stop reason: SURG

## 2018-10-17 RX ORDER — SODIUM CHLORIDE 0.9 % (FLUSH) 0.9 %
1-10 SYRINGE (ML) INJECTION AS NEEDED
Status: DISCONTINUED | OUTPATIENT
Start: 2018-10-17 | End: 2018-10-17 | Stop reason: HOSPADM

## 2018-10-17 RX ORDER — HYDROCODONE BITARTRATE AND ACETAMINOPHEN 5; 325 MG/1; MG/1
1-2 TABLET ORAL EVERY 6 HOURS PRN
Qty: 20 TABLET | Refills: 0 | Status: SHIPPED | OUTPATIENT
Start: 2018-10-17 | End: 2022-09-15

## 2018-10-17 RX ORDER — OXYCODONE AND ACETAMINOPHEN 7.5; 325 MG/1; MG/1
2 TABLET ORAL ONCE AS NEEDED
Status: CANCELLED | OUTPATIENT
Start: 2018-10-17

## 2018-10-17 RX ORDER — MIDAZOLAM HYDROCHLORIDE 1 MG/ML
1 INJECTION INTRAMUSCULAR; INTRAVENOUS
Status: DISCONTINUED | OUTPATIENT
Start: 2018-10-17 | End: 2018-10-17 | Stop reason: HOSPADM

## 2018-10-17 RX ORDER — ACETAMINOPHEN 500 MG
1000 TABLET ORAL ONCE
Status: COMPLETED | OUTPATIENT
Start: 2018-10-17 | End: 2018-10-17

## 2018-10-17 RX ORDER — NALOXONE HCL 0.4 MG/ML
0.4 VIAL (ML) INJECTION AS NEEDED
Status: CANCELLED | OUTPATIENT
Start: 2018-10-17

## 2018-10-17 RX ORDER — SODIUM CHLORIDE 0.9 % (FLUSH) 0.9 %
3 SYRINGE (ML) INJECTION AS NEEDED
Status: DISCONTINUED | OUTPATIENT
Start: 2018-10-17 | End: 2018-10-17 | Stop reason: HOSPADM

## 2018-10-17 RX ORDER — ONDANSETRON 2 MG/ML
4 INJECTION INTRAMUSCULAR; INTRAVENOUS ONCE AS NEEDED
Status: CANCELLED | OUTPATIENT
Start: 2018-10-17

## 2018-10-17 RX ORDER — HYDROCODONE BITARTRATE AND ACETAMINOPHEN 5; 325 MG/1; MG/1
1 TABLET ORAL ONCE AS NEEDED
Status: DISCONTINUED | OUTPATIENT
Start: 2018-10-17 | End: 2018-10-17 | Stop reason: HOSPADM

## 2018-10-17 RX ORDER — SODIUM CHLORIDE, SODIUM LACTATE, POTASSIUM CHLORIDE, CALCIUM CHLORIDE 600; 310; 30; 20 MG/100ML; MG/100ML; MG/100ML; MG/100ML
100 INJECTION, SOLUTION INTRAVENOUS CONTINUOUS
Status: DISCONTINUED | OUTPATIENT
Start: 2018-10-17 | End: 2018-10-17 | Stop reason: HOSPADM

## 2018-10-17 RX ORDER — IBUPROFEN 600 MG/1
600 TABLET ORAL ONCE AS NEEDED
Status: CANCELLED | OUTPATIENT
Start: 2018-10-17

## 2018-10-17 RX ORDER — ONDANSETRON 2 MG/ML
4 INJECTION INTRAMUSCULAR; INTRAVENOUS ONCE AS NEEDED
Status: DISCONTINUED | OUTPATIENT
Start: 2018-10-17 | End: 2018-10-17 | Stop reason: HOSPADM

## 2018-10-17 RX ADMIN — LIDOCAINE HYDROCHLORIDE 0.5 ML: 10 INJECTION, SOLUTION EPIDURAL; INFILTRATION; INTRACAUDAL; PERINEURAL at 06:35

## 2018-10-17 RX ADMIN — LIDOCAINE HYDROCHLORIDE 50 MG: 20 INJECTION, SOLUTION INFILTRATION; PERINEURAL at 08:03

## 2018-10-17 RX ADMIN — ACETAMINOPHEN 1000 MG: 500 TABLET, FILM COATED ORAL at 07:54

## 2018-10-17 RX ADMIN — MIDAZOLAM HYDROCHLORIDE 2 MG: 1 INJECTION, SOLUTION INTRAMUSCULAR; INTRAVENOUS at 07:54

## 2018-10-17 RX ADMIN — SODIUM CHLORIDE, POTASSIUM CHLORIDE, SODIUM LACTATE AND CALCIUM CHLORIDE 1000 ML: 600; 310; 30; 20 INJECTION, SOLUTION INTRAVENOUS at 06:35

## 2018-10-17 RX ADMIN — Medication 2 G: at 08:05

## 2018-10-17 RX ADMIN — SUFENTANIL CITRATE 10 MCG: 50 INJECTION, SOLUTION EPIDURAL; INTRAVENOUS at 08:07

## 2018-10-17 RX ADMIN — PROPOFOL 24 MCG/KG/MIN: 10 INJECTION, EMULSION INTRAVENOUS at 08:01
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
## 2018-10-17 NOTE — BRIEF OP NOTE
SAPHENOUS VEIN RADIO FREQUENCY ABLATION  Progress Note    Marily Reese  10/17/2018    Pre-op Diagnosis:   Venous insufficiency [I87.2]  Preop testing [Z01.818]       Post-Op Diagnosis Codes:     * Venous insufficiency [I87.2]     * Preop testing [Z01.818]    Procedure/CPT® Codes:      Procedure(s):  RIGHT SAPHENOUS VEIN RADIO FREQUENCY ABLATION    Surgeon(s):  Mp Ruiz DO    Anesthesia: Monitor Anesthesia Care    Staff:   Circulator: Diandra Williamson RN  Scrub Person: Usha Malik  Assistant: Seth Asif  Vascular Ultrasound Technician: Logan Jordan    Estimated Blood Loss: 5ml    Urine Voided: * No values recorded between 10/17/2018  8:00 AM and 10/17/2018  8:29 AM *    Specimens:                None      Complications: none      Mp Ruiz DO     Date: 10/17/2018  Time: 8:29 AM

## 2018-10-17 NOTE — DISCHARGE INSTR - APPOINTMENTS
The [facility] Scheduling Department will call you with an appointment date and time for your ultrasound. Please call [phone] if you haven't heard from them by next Monday.
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
## 2018-10-17 NOTE — DISCHARGE INSTRUCTIONS
YOUR NEXT PAIN MEDICATION IS DUE AT______________        Moderate Conscious Sedation, Adult, Care After  Refer to this sheet in the next few weeks. These instructions provide you with information on caring for yourself after your procedure. Your health care provider may also give you more specific instructions. Your treatment has been planned according to current medical practices, but problems sometimes occur. Call your health care provider if you have any problems or questions after your procedure.  WHAT TO EXPECT AFTER THE PROCEDURE    After your procedure:  · You may feel sleepy, clumsy, and have poor balance for several hours.  · Vomiting may occur if you eat too soon after the procedure.  HOME CARE INSTRUCTIONS  · Do not participate in any activities where you could become injured for at least 24 hours. Do not:  ¨ Drive.  ¨ Swim.  ¨ Ride a bicycle.  ¨ Operate heavy machinery.  ¨ Cook.  ¨ Use power tools.  ¨ Climb ladders.  ¨ Work from a high place.  · Do not make important decisions or sign legal documents until you are improved.  · If you vomit, drink water, juice, or soup when you can drink without vomiting. Make sure you have little or no nausea before eating solid foods.  · Only take over-the-counter or prescription medicines for pain, discomfort, or fever as directed by your health care provider.  · Make sure you and your family fully understand everything about the medicines given to you, including what side effects may occur.  · You should not drink alcohol, take sleeping pills, or take medicines that cause drowsiness for at least 24 hours.  · If you smoke, do not smoke without supervision.  · If you are feeling better, you may resume normal activities 24 hours after you were sedated.  · Keep all appointments with your health care provider.  SEEK MEDICAL CARE IF:  · Your skin is pale or bluish in color.  · You continue to feel nauseous or vomit.  · Your pain is getting worse and is not helped by  medicine.  · You have bleeding or swelling.  · You are still sleepy or feeling clumsy after 24 hours.  SEEK IMMEDIATE MEDICAL CARE IF:  · You develop a rash.  · You have difficulty breathing.  · You develop any type of allergic problem.  · You have a fever.  MAKE SURE YOU:  · Understand these instructions.  · Will watch your condition.  · Will get help right away if you are not doing well or get worse.     This information is not intended to replace advice given to you by your health care provider. Make sure you discuss any questions you have with your health care provider.     Document Released: 10/08/2014 Document Revised: 01/08/2016 Document Reviewed: 10/08/2014  [employer] Interactive Patient Education ©2016 [employer]         CALL YOUR PHYSICIAN IF YOU EXPERIENCE  INCREASED PAIN NOT HELPED BY YOUR PAIN MEDICATION.        Fall Prevention in the Home      Falls can cause injuries. They can happen to people of all ages. There are many things you can do to make your home safe and to help prevent falls.    WHAT CAN I DO ON THE OUTSIDE OF MY HOME?  · Regularly fix the edges of walkways and driveways and fix any cracks.  · Remove anything that might make you trip as you walk through a door, such as a raised step or threshold.  · Trim any bushes or trees on the path to your home.  · Use bright outdoor lighting.  · Clear any walking paths of anything that might make someone trip, such as rocks or tools.  · Regularly check to see if handrails are loose or broken. Make sure that both sides of any steps have handrails.  · Any raised decks and porches should have guardrails on the edges.  · Have any leaves, snow, or ice cleared regularly.  · Use sand or salt on walking paths during winter.  · Clean up any spills in your garage right away. This includes oil or grease spills.  WHAT CAN I DO IN THE BATHROOM?    · Use night lights.  · Install grab bars by the toilet and in the tub and shower. Do not use towel bars as grab  bars.  · Use non-skid mats or decals in the tub or shower.  · If you need to sit down in the shower, use a plastic, non-slip stool.  · Keep the floor dry. Clean up any water that spills on the floor as soon as it happens.  · Remove soap buildup in the tub or shower regularly.  · Attach bath mats securely with double-sided non-slip rug tape.  · Do not have throw rugs and other things on the floor that can make you trip.  WHAT CAN I DO IN THE BEDROOM?  · Use night lights.  · Make sure that you have a light by your bed that is easy to reach.  · Do not use any sheets or blankets that are too big for your bed. They should not hang down onto the floor.  · Have a firm chair that has side arms. You can use this for support while you get dressed.  · Do not have throw rugs and other things on the floor that can make you trip.  WHAT CAN I DO IN THE KITCHEN?  · Clean up any spills right away.  · Avoid walking on wet floors.  · Keep items that you use a lot in easy-to-reach places.  · If you need to reach something above you, use a strong step stool that has a grab bar.  · Keep electrical cords out of the way.  · Do not use floor polish or wax that makes floors slippery. If you must use wax, use non-skid floor wax.  · Do not have throw rugs and other things on the floor that can make you trip.  WHAT CAN I DO WITH MY STAIRS?  · Do not leave any items on the stairs.  · Make sure that there are handrails on both sides of the stairs and use them. Fix handrails that are broken or loose. Make sure that handrails are as long as the stairways.  · Check any carpeting to make sure that it is firmly attached to the stairs. Fix any carpet that is loose or worn.  · Avoid having throw rugs at the top or bottom of the stairs. If you do have throw rugs, attach them to the floor with carpet tape.  · Make sure that you have a light switch at the top of the stairs and the bottom of the stairs. If you do not have them, ask someone to add them for  you.  WHAT ELSE CAN I DO TO HELP PREVENT FALLS?  · Wear shoes that:  ¨ Do not have high heels.  ¨ Have rubber bottoms.  ¨ Are comfortable and fit you well.  ¨ Are closed at the toe. Do not wear sandals.  · If you use a stepladder:  ¨ Make sure that it is fully opened. Do not climb a closed stepladder.  ¨ Make sure that both sides of the stepladder are locked into place.  ¨ Ask someone to hold it for you, if possible.  · Clearly cricket and make sure that you can see:  ¨ Any grab bars or handrails.  ¨ First and last steps.  ¨ Where the edge of each step is.  · Use tools that help you move around (mobility aids) if they are needed. These include:  ¨ Canes.  ¨ Walkers.  ¨ Scooters.  ¨ Crutches.  · Turn on the lights when you go into a dark area. Replace any light bulbs as soon as they burn out.  · Set up your furniture so you have a clear path. Avoid moving your furniture around.  · If any of your floors are uneven, fix them.  · If there are any pets around you, be aware of where they are.  · Review your medicines with your doctor. Some medicines can make you feel dizzy. This can increase your chance of falling.  Ask your doctor what other things that you can do to help prevent falls.     This information is not intended to replace advice given to you by your health care provider. Make sure you discuss any questions you have with your health care provider.     Document Released: 10/14/2010 Document Revised: 05/03/2016 Document Reviewed: 01/22/2016  Elsevier Interactive Patient Education ©2016 [employer]     PATIENT/FAMILY/RESPONSIBLE PARTY VERBALIZES UNDERSTANDING OF ABOVE EDUCATION.  COPY OF PAIN SCALE GIVEN AND REVIEWED WITH VERBALIZED UNDERSTANDING.

## 2018-10-17 NOTE — ANESTHESIA POSTPROCEDURE EVALUATION
Patient: Marily Reese    Procedure Summary     Date:  10/17/18 Room / Location:  [facility] OR  /  PAD HYBRID OR 12    Anesthesia Start:  0801 Anesthesia Stop:  0843    Procedure:  RIGHT SAPHENOUS VEIN RADIO FREQUENCY ABLATION (Right Leg Lower) Diagnosis:       Venous insufficiency      Preop testing      (Venous insufficiency [I87.2])      (Preop testing [Z01.818])    Surgeon:  Mp Ruiz DO Provider:  Ryan Rodrigeuz CRNA    Anesthesia Type:  general ASA Status:  2          Anesthesia Type: general  Last vitals  BP   108/62 (10/17/18 0855)   Temp   96.6 °F (35.9 °C) (10/17/18 0847)   Pulse   69 (10/17/18 0855)   Resp   16 (10/17/18 0855)     SpO2   93 % (10/17/18 0855)     Post Anesthesia Care and Evaluation    Patient location during evaluation: PACU  Patient participation: complete - patient participated  Level of consciousness: awake and alert  Pain management: adequate  Airway patency: patent  Anesthetic complications: No anesthetic complications  PONV Status: none  Cardiovascular status: acceptable and hemodynamically stable  Respiratory status: acceptable  Hydration status: acceptable    Comments: Blood pressure 108/62, pulse 69, temperature 96.6 °F (35.9 °C), temperature source Temporal Artery , resp. rate 16, SpO2 93 %.    Patient discharged from PACU based upon Lelo score. Please see RN notes for further details

## 2018-10-23 ENCOUNTER — TELEPHONE (OUTPATIENT)
Dept: VASCULAR SURGERY | Facility: CLINIC | Age: 73
End: 2018-10-23

## 2018-10-23 NOTE — TELEPHONE ENCOUNTER
Left message reminding Ms Reese of her appointments for Wednesday, October 24th, 2018. Rem[facility] and follow up afterwards at 1015 am with Saida FLOREZ. Also advised if she had any questions or needed to reschedule to please call the office at [phone].

## 2018-10-24 ENCOUNTER — APPOINTMENT (OUTPATIENT)
Dept: ULTRASOUND IMAGING | Facility: HOSPITAL | Age: 73
End: 2018-10-24
Attending: SURGERY

## 2018-11-05 ENCOUNTER — TELEPHONE (OUTPATIENT)
Dept: VASCULAR SURGERY | Facility: CLINIC | Age: 73
End: 2018-11-05

## 2018-11-05 NOTE — TELEPHONE ENCOUNTER
CALLED PT TO R/S TESTING AND APPT WITH FRANK. INSTRUCTED HER TO ARRIVE AT 10:30 FOR AN 11:00 APPT AT THE [facility] [place]

## 2018-11-07 ENCOUNTER — HOSPITAL ENCOUNTER (OUTPATIENT)
Dept: ULTRASOUND IMAGING | Facility: HOSPITAL | Age: 73
Discharge: HOME | End: 2018-11-07
Attending: SURGERY | Admitting: SURGERY

## 2018-11-07 ENCOUNTER — OFFICE VISIT (OUTPATIENT)
Dept: VASCULAR SURGERY | Facility: CLINIC | Age: 73
End: 2018-11-07

## 2018-11-07 VITALS
WEIGHT: 140 LBS | OXYGEN SATURATION: 96 % | HEIGHT: 68 IN | HEART RATE: 76 BPM | BODY MASS INDEX: 21.22 KG/M2 | SYSTOLIC BLOOD PRESSURE: 118 MMHG | DIASTOLIC BLOOD PRESSURE: 64 MMHG

## 2018-11-07 DIAGNOSIS — I87.2 VENOUS INSUFFICIENCY: ICD-10-CM

## 2018-11-07 DIAGNOSIS — I87.2 VENOUS INSUFFICIENCY: Primary | ICD-10-CM

## 2018-11-07 DIAGNOSIS — I87.391 CHRONIC VENOUS HYPERTENSION (IDIOPATHIC) WITH OTHER COMPLICATIONS OF RIGHT LOWER EXTREMITY: ICD-10-CM

## 2018-11-07 PROCEDURE — 99213 OFFICE O/P EST LOW 20 MIN: CPT | Performed by: NURSE PRACTITIONER

## 2018-11-07 PROCEDURE — 93971 EXTREMITY STUDY: CPT

## 2018-11-07 PROCEDURE — 93971 EXTREMITY STUDY: CPT | Performed by: SURGERY

## 2018-11-07 NOTE — PROGRESS NOTES
"11/07/2018       Louie Huntley,   [facility]   [facility] [address]    Marily Reese  1945    Chief Complaint   Patient presents with   • Follow-up     1 Week Post-Op Follow Up FOr RIGHT SAPHENOUS VEIN RADIO FREQUENCY ABLATION. Test 11/07/2018 US pad venous lower ext right. Patient denies any stroke like symptoms.        Dear Louie Huntley DO        HPI  I had the pleasure of seeing your patient Marily Reese in the office today.  As you recall, Marily Reese is a 73 y.o.  female who we are following for venous insufficiency.  She is having complaints of leg cramping and pain in her feet.  This has been present for five years.  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  She has always walked with a limp, but this has worsened.  She is complaining of foot pain on the right.  She does have complaints of cramping to her legs at night.  She did undergo a right lower extremity radiofrequency ablation of the greater saphenous vein and is now back for follow-up.  She did have noninvasive testing performed today, which I did review in office.  She does state that her pain has completely resolved to her right leg.    Review of Systems   Constitutional: Negative.    HENT: Negative.    Eyes: Negative.    Respiratory: Negative.    Cardiovascular: Negative.         Varicose veins to bilateral lower extremities   Gastrointestinal: Negative.    Endocrine: Negative.    Genitourinary: Negative.    Musculoskeletal: Positive for gait problem.        Right ankle and foot pain   Skin: Negative.    Allergic/Immunologic: Negative.    Hematological: Negative.    Psychiatric/Behavioral: Negative.        /64 (BP Location: Left arm, Patient Position: Sitting, Cuff Size: Adult)   Pulse 76   Ht 172.7 cm (68\")   Wt 63.5 kg (140 lb)   SpO2 96%   BMI 21.29 kg/m²   Physical Exam   Constitutional: She is oriented to person, place, and time. She appears well-developed and " well-nourished. No distress.   HENT:   Head: Normocephalic and atraumatic.   Mouth/Throat: Oropharynx is clear and moist.   Eyes: Pupils are equal, round, and reactive to light. No scleral icterus.   Neck: Normal range of motion. Neck supple. No JVD present. Carotid bruit is not present. No thyromegaly present.   Cardiovascular: Normal rate, regular rhythm, S2 normal, normal heart sounds, intact distal pulses and normal pulses.  Exam reveals no gallop and no friction rub.    No murmur heard.  Varicose veins to bilateral lower extremities   Pulmonary/Chest: Effort normal and breath sounds normal.   Abdominal: Soft. Normal aorta and bowel sounds are normal. There is no hepatosplenomegaly.   Musculoskeletal: Normal range of motion.   Neurological: She is alert and oriented to person, place, and time. No cranial nerve deficit.   Skin: Skin is warm and dry. She is not diaphoretic.   Psychiatric: She has a normal mood and affect. Her behavior is normal. Judgment and thought content normal.   Nursing note and vitals reviewed.    Diagnostic data:  Noninvasive testing including a right lower extremity venous duplex shows greater saphenous vein to be closed as expected post RFA with no evidence of DVT.         Patient Active Problem List   Diagnosis   • Leg cramps   • Foot drop, right   • Venous insufficiency   • Preop testing         ICD-10-CM ICD-9-CM   1. Venous insufficiency I87.2 459.81         Plan: After thoroughly evaluating Marily Reese, I am pleased to report she is doing well status post radio frequency ablation of the right lower extremity greater saphenous vein.  She reports that her right leg pain and severe cramping at night has completely resolved.  We will see her back in 6 months for continued follow-up.  I would like her to continue with the compression stockings  on a daily basis.  The patient can continue taking her current medication regimen as previously planned.  This was all discussed in full with  complete understanding.    Thank you for allowing me to participate in the care of your patient.  Please do not hesitate with any questions or concerns.  I will keep you aware of any further encounters with Marily Reese.        Sincerely yours,         Saida Reddy, VIKKI Huntley, Louie Broderick, DO

## 2021-04-20 ENCOUNTER — TELEPHONE (OUTPATIENT)
Dept: VASCULAR SURGERY | Facility: CLINIC | Age: 76
End: 2021-04-20

## 2021-04-20 NOTE — TELEPHONE ENCOUNTER
Patient called and cancelled apt for tomorrow due to surgery on her foot. Will call back to reschedule

## 2021-04-23 ENCOUNTER — TELEPHONE (OUTPATIENT)
Dept: VASCULAR SURGERY | Facility: CLINIC | Age: 76
End: 2021-04-23

## 2021-04-23 NOTE — TELEPHONE ENCOUNTER
Called patient about rescheduling apt from 4/21. Patient stated she was having foot surgery but would call and reschedule once healed from that surgery.

## 2021-07-09 ENCOUNTER — TELEPHONE (OUTPATIENT)
Dept: VASCULAR SURGERY | Facility: CLINIC | Age: 76
End: 2021-07-09

## 2021-07-09 NOTE — TELEPHONE ENCOUNTER
The patient called to reschedule her last appt she had cancelled with Saida.  The first appt that I had given her showed that it was open, but it wouldn't let met schedule.  After refreshing the page, somebody had already scheduled someone in that spot.  I called the patient back and explained what had happened.  I told her that we would have to reschedule her appt.  I got her another appointment and she wrote down all of the information.

## 2021-07-29 ENCOUNTER — TELEPHONE (OUTPATIENT)
Dept: VASCULAR SURGERY | Facility: CLINIC | Age: 76
End: 2021-07-29

## 2021-07-29 NOTE — TELEPHONE ENCOUNTER
Spoke with Ms Hugh reminding her of her appointments for Friday, July 30th, 2021 at 930 am with Saida FLOREZ. Ms Hugh confirmed she would be here.

## 2021-07-30 ENCOUNTER — OFFICE VISIT (OUTPATIENT)
Dept: VASCULAR SURGERY | Facility: CLINIC | Age: 76
End: 2021-07-30

## 2021-07-30 VITALS
DIASTOLIC BLOOD PRESSURE: 68 MMHG | OXYGEN SATURATION: 96 % | SYSTOLIC BLOOD PRESSURE: 128 MMHG | BODY MASS INDEX: 23.19 KG/M2 | WEIGHT: 153 LBS | HEIGHT: 68 IN | HEART RATE: 57 BPM

## 2021-07-30 DIAGNOSIS — I87.2 VENOUS INSUFFICIENCY: Primary | ICD-10-CM

## 2021-07-30 PROBLEM — Z01.818 PREOP TESTING: Status: RESOLVED | Noted: 2018-08-09 | Resolved: 2021-07-30

## 2021-07-30 PROCEDURE — 99213 OFFICE O/P EST LOW 20 MIN: CPT | Performed by: NURSE PRACTITIONER

## 2021-07-30 RX ORDER — DONEPEZIL HYDROCHLORIDE 10 MG/1
10 TABLET, FILM COATED ORAL DAILY
COMMUNITY
Start: 2021-06-14

## 2021-07-30 RX ORDER — DULOXETIN HYDROCHLORIDE 60 MG/1
CAPSULE, DELAYED RELEASE ORAL
COMMUNITY
Start: 2021-06-14 | End: 2022-09-26

## 2021-07-30 RX ORDER — ROPINIROLE 0.25 MG/1
TABLET, FILM COATED ORAL
COMMUNITY
Start: 2021-06-14 | End: 2022-09-26

## 2021-07-30 RX ORDER — ALBUTEROL SULFATE 90 UG/1
AEROSOL, METERED RESPIRATORY (INHALATION)
COMMUNITY
Start: 2021-05-03 | End: 2022-09-26

## 2021-07-30 NOTE — PROGRESS NOTES
"07/30/2021       Louie Huntley,   [address] [place] [address]   [facility] [address]    Marily Reese  1945    Chief Complaint   Patient presents with   • Follow-up     follow up (2 yrs) Venous insufficiency. pt states pain is 10/10. Right leg.   • Med Management     verbally verified medication with pt.   • Nicotine Dependence     pt states she does not smoke.       Dear Louie Huntley DO          I had the pleasure of seeing your patient Marily Reese in the office today.  As you recall, Marily Reese is a 76 y.o.  female who we are following for venous insufficiency.  She has had cramping to her legs for many years with varicose veins.   She does have complaints of foot pain in the right and is going to have upcoming surgery with Dr. Lyman.  She has previously had radiofrequency ablation of the right lower extremity.  She is having a lot of pain but more related to her foot.  She does not have any significant swelling.    Review of Systems   Constitutional: Negative.    HENT: Negative.    Eyes: Negative.    Respiratory: Negative.    Cardiovascular: Negative.         Varicose veins to bilateral lower extremities   Gastrointestinal: Negative.    Endocrine: Negative.    Genitourinary: Negative.    Musculoskeletal: Positive for gait problem.        Right foot and ankle pain   Skin: Negative.    Allergic/Immunologic: Negative.    Hematological: Negative.    Psychiatric/Behavioral: Negative.         /68 (BP Location: Right arm, Patient Position: Sitting, Cuff Size: Adult)   Pulse 57   Ht 172.7 cm (68\")   Wt 69.4 kg (153 lb)   SpO2 96%   BMI 23.26 kg/m²    Physical Exam  Vitals and nursing note reviewed.   Constitutional:       General: She is not in acute distress.     Appearance: Normal appearance. She is well-developed and normal weight. She is not diaphoretic.   HENT:      Head: Normocephalic and atraumatic.   Eyes:      General: No scleral icterus.     Pupils: Pupils are equal, " round, and reactive to light.   Neck:      Thyroid: No thyromegaly.      Vascular: No carotid bruit or JVD.   Cardiovascular:      Rate and Rhythm: Normal rate and regular rhythm.      Pulses: Normal pulses.      Heart sounds: Normal heart sounds and S2 normal. No murmur heard.   No friction rub. No gallop.       Comments: Varicose veins to bilateral lower extremities  Pulmonary:      Effort: Pulmonary effort is normal.      Breath sounds: Normal breath sounds.   Abdominal:      General: Bowel sounds are normal.      Palpations: Abdomen is soft.   Musculoskeletal:         General: Normal range of motion.      Cervical back: Normal range of motion and neck supple.      Comments: Using a cane   Skin:     General: Skin is warm and dry.   Neurological:      General: No focal deficit present.      Mental Status: She is alert and oriented to person, place, and time.      Cranial Nerves: No cranial nerve deficit.   Psychiatric:         Mood and Affect: Mood normal.         Behavior: Behavior normal.         Thought Content: Thought content normal.         Judgment: Judgment normal.              Patient Active Problem List   Diagnosis   • Leg cramps   • Foot drop, right   • Venous insufficiency         ICD-10-CM ICD-9-CM   1. Venous insufficiency  I87.2 459.81         Plan: After thoroughly evaluating Marily Reese, I believe the best course of action is to remain conservative from vascular surgery standpoint.  I do not think her significant pain is related to venous insufficiency but more related to her foot.  She is going to be having upcoming surgery with Dr. Lyman.  She has not been seen here for the past 3 years.  I did recommend she continue wearing compression stockings.  I did offer repeat venous valvular insufficiency study but she would like to hold off at this time.  I will make her a follow-up appointment for 1 year.  If she wants to pursue any further, I can order testing at that time.   The patient can  continue taking her current medication regimen as previously planned.  This was all discussed in full with complete understanding.    Thank you for allowing me to participate in the care of your patient.  Please do not hesitate with any questions or concerns.  I will keep you aware of any further encounters with Marily Reese.        Sincerely yours,         Saida Reddy, VIKKI Huntley, Louie Broderick, DO

## 2022-07-13 ENCOUNTER — TELEPHONE (OUTPATIENT)
Dept: VASCULAR SURGERY | Facility: CLINIC | Age: 77
End: 2022-07-13
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
## 2022-09-15 ENCOUNTER — OFFICE VISIT (OUTPATIENT)
Dept: OTOLARYNGOLOGY | Facility: CLINIC | Age: 77
End: 2022-09-15

## 2022-09-15 VITALS
BODY MASS INDEX: 22.88 KG/M2 | HEART RATE: 68 BPM | HEIGHT: 68 IN | TEMPERATURE: 97.3 F | DIASTOLIC BLOOD PRESSURE: 70 MMHG | WEIGHT: 151 LBS | SYSTOLIC BLOOD PRESSURE: 124 MMHG

## 2022-09-15 DIAGNOSIS — R06.83 SNORING: ICD-10-CM

## 2022-09-15 DIAGNOSIS — R40.0 DAYTIME SOMNOLENCE: ICD-10-CM

## 2022-09-15 DIAGNOSIS — G47.33 OSA (OBSTRUCTIVE SLEEP APNEA): Primary | ICD-10-CM

## 2022-09-15 PROCEDURE — 99214 OFFICE O/P EST MOD 30 MIN: CPT | Performed by: NURSE PRACTITIONER

## 2022-09-15 RX ORDER — LINACLOTIDE 145 UG/1
CAPSULE, GELATIN COATED ORAL
COMMUNITY
Start: 2022-09-06 | End: 2022-09-26

## 2022-09-16 PROBLEM — R06.83 SNORING: Status: ACTIVE | Noted: 2022-09-16

## 2022-09-16 PROBLEM — G47.33 OSA (OBSTRUCTIVE SLEEP APNEA): Status: ACTIVE | Noted: 2022-09-16

## 2022-09-16 PROBLEM — R40.0 DAYTIME SOMNOLENCE: Status: ACTIVE | Noted: 2022-09-16

## 2022-09-26 ENCOUNTER — HOSPITAL ENCOUNTER (OUTPATIENT)
Dept: GENERAL RADIOLOGY | Facility: HOSPITAL | Age: 77
Discharge: HOME | End: 2022-09-26

## 2022-09-26 ENCOUNTER — PRE-ADMISSION TESTING (OUTPATIENT)
Dept: PREADMISSION TESTING | Facility: HOSPITAL | Age: 77
End: 2022-09-26

## 2022-09-26 VITALS
HEART RATE: 64 BPM | SYSTOLIC BLOOD PRESSURE: 127 MMHG | WEIGHT: 156.75 LBS | DIASTOLIC BLOOD PRESSURE: 54 MMHG | HEIGHT: 67 IN | OXYGEN SATURATION: 99 % | BODY MASS INDEX: 24.6 KG/M2 | RESPIRATION RATE: 20 BRPM

## 2022-09-26 DIAGNOSIS — G47.33 OSA (OBSTRUCTIVE SLEEP APNEA): ICD-10-CM

## 2022-09-26 DIAGNOSIS — R40.0 DAYTIME SOMNOLENCE: ICD-10-CM

## 2022-09-26 DIAGNOSIS — R06.83 SNORING: ICD-10-CM

## 2022-09-26 LAB
ANION GAP SERPL CALCULATED.3IONS-SCNC: 11 MMOL/L (ref 5–15)
BUN SERPL-MCNC: 13 MG/DL (ref 8–23)
BUN/CREAT SERPL: 18.8 (ref 7–25)
CALCIUM SPEC-SCNC: 9.3 MG/DL (ref 8.6–10.5)
CHLORIDE SERPL-SCNC: 106 MMOL/L (ref 98–107)
CO2 SERPL-SCNC: 27 MMOL/L (ref 22–29)
CREAT SERPL-MCNC: 0.69 MG/DL (ref 0.57–1)
DEPRECATED RDW RBC AUTO: 46.9 FL (ref 37–54)
EGFRCR SERPLBLD CKD-EPI 2021: 90.6 ML/MIN/1.73
ERYTHROCYTE [DISTWIDTH] IN BLOOD BY AUTOMATED COUNT: 13.5 % (ref 12.3–15.4)
GLUCOSE SERPL-MCNC: 95 MG/DL (ref 65–99)
HCT VFR BLD AUTO: 36.2 % (ref 34–46.6)
HGB BLD-MCNC: 11.9 G/DL (ref 12–15.9)
MCH RBC QN AUTO: 31.2 PG (ref 26.6–33)
MCHC RBC AUTO-ENTMCNC: 32.9 G/DL (ref 31.5–35.7)
MCV RBC AUTO: 95 FL (ref 79–97)
PLATELET # BLD AUTO: 154 10*3/MM3 (ref 140–450)
PMV BLD AUTO: 12.4 FL (ref 6–12)
POTASSIUM SERPL-SCNC: 4 MMOL/L (ref 3.5–5.2)
RBC # BLD AUTO: 3.81 10*6/MM3 (ref 3.77–5.28)
SODIUM SERPL-SCNC: 144 MMOL/L (ref 136–145)
WBC NRBC COR # BLD: 5.85 10*3/MM3 (ref 3.4–10.8)

## 2022-09-26 PROCEDURE — 93010 ELECTROCARDIOGRAM REPORT: CPT | Performed by: HOSPITALIST

## 2022-09-26 PROCEDURE — 93005 ELECTROCARDIOGRAM TRACING: CPT

## 2022-09-26 PROCEDURE — 85027 COMPLETE CBC AUTOMATED: CPT

## 2022-09-26 PROCEDURE — 71045 X-RAY EXAM CHEST 1 VIEW: CPT

## 2022-09-26 PROCEDURE — 36415 COLL VENOUS BLD VENIPUNCTURE: CPT

## 2022-09-26 PROCEDURE — 80048 BASIC METABOLIC PNL TOTAL CA: CPT

## 2022-09-26 NOTE — DISCHARGE INSTRUCTIONS
Before you come to the hospital        Arrival time: AS DIRECTED BY OFFICE     YOU MAY TAKE THE FOLLOWING MEDICATION(S) THE MORNING OF SURGERY WITH A SIP OF WATER: none           ALL OTHER HOME MEDICATION CHECK WITH YOUR PHYSICIAN (especially if you are taking diabetes medicines or blood thinners).            Eating and drinking restrictions prior to scheduled arrival time    2 Hours before arrival time STOP   Drinking Clear liquids (water, apple juice-no pulp)     6 Hours before arrival time STOP   Milk or drinks that contain milk, full liquids    6 Hours before arrival time STOP   Light meals or foods, such as toast or cereal    8 Hours before arrival time STOP   Heavy foods, such as meat, fried foods, or fatty foods    (It is extremely important that you follow these guidelines to prevent delay or cancelation of your procedure)     Clear Liquids  Water and flavored water                                                                      Clear Fruit juices, such as cranberry juice and apple juice.  Black coffee (NO cream of any kind, including powdered).  Plain tea  Clear bouillon or broth.  Flavored gelatin.  Soda.  Gatorade or Powerade.  Full liquid examples  Juices that have pulp.  Frozen ice pops that contain fruit pieces.  Coffee with creamer  Milk.  Yogurt.                MANAGING PAIN AFTER SURGERY    We know you are probably wondering what your pain will be like after surgery.  Following surgery it is unrealistic to expect you will not have pain.   Pain is how our bodies let us know that something is wrong or cautions us to be careful.  That said, our goal is to make your pain tolerable.    Methods we may use to treat your pain include (oral or IV medications, PCAs, epidurals, nerve blocks, etc.)   While some procedures require IV pain medications for a short time after surgery, transitioning to pain medications by mouth allows for better management of pain.   Your nurse will encourage you to take oral  pain medications whenever possible.  IV medications work almost immediately, but only last a short while.  Taking medications by mouth allows for a more constant level of medication in your blood stream for a longer period of time.      Once your pain is out of control it is harder to get back under control.  It is important you are aware when your next dose of pain medication is due.  If you are admitted, your nurse may write the time of your next dose on the white board in your room to help you remember.      We are interested in your pain and encourage you to inform us about aggravating factors during your visit.   Many times a simple repositioning every few hours can make a big difference.    If your physician says it is okay, do not let your pain prevent you from getting out of bed. Be sure to call your nurse for assistance prior to getting up so you do not fall.      Before surgery, please decide your tolerable pain goal.  These faces help describe the pain ratings we use on a 0-10 scale.   Be prepared to tell us your goal and whether or not you take pain or anxiety medications at home.          Preparing for Surgery  Preparing for surgery is an important part of your care. It can make things go more smoothly and help you avoid complications. The steps leading up to surgery may vary among hospitals. Follow all instructions given to you by your health care providers. Ask questions if you do not understand something. Talk about any concerns that you have.  Here are some questions to consider asking before your surgery:  If my surgery is not an emergency (is elective), when would be the best time to have the surgery?  What arrangements do I need to make for work, home, or school?  What will my recovery be like? How long will it be before I can return to normal activities?  Will I need to prepare my home? Will I need to arrange care for me or my children?  Should I expect to have pain after surgery? What are my  pain management options? Are there nonmedical options that I can try for pain?  Tell a health care provider about:  Any allergies you have.  All medicines you are taking, including vitamins, herbs, eye drops, creams, and over-the-counter medicines.  Any problems you or family members have had with anesthetic medicines.  Any blood disorders you have.  Any surgeries you have had.  Any medical conditions you have.  Whether you are pregnant or may be pregnant.  What are the risks?  The risks and complications of surgery depend on the specific procedure that you have. Discuss all the risks with your health care providers before your surgery. Ask about common surgical complications, which may include:  Infection.  Bleeding or a need for blood replacement (transfusion).  Allergic reactions to medicines.  Damage to surrounding nerves, tissues, or structures.  A blood clot.  Scarring.  Failure of the surgery to correct the problem.  Follow these instructions before the procedure:  Several days or weeks before your procedure  You may have a physical exam by your primary health care provider to make sure it is safe for you to have surgery.  You may have testing. This may include a chest X-ray, blood and urine tests, electrocardiogram (ECG), or other testing.  Ask your health care provider about:  Changing or stopping your regular medicines. This is especially important if you are taking diabetes medicines or blood thinners.  Taking medicines such as aspirin and ibuprofen. These medicines can thin your blood. Do not take these medicines unless your health care provider tells you to take them.  Taking over-the-counter medicines, vitamins, herbs, and supplements.  Do not use any products that contain nicotine or tobacco, such as cigarettes and e-cigarettes. If you need help quitting, ask your health care provider.  Avoid alcohol.  Ask your health care provider if there are exercises you can do to prepare for surgery.  Eat a  healthy diet.   Plan to have someone take you home from the hospital or clinic.  Plan to have a responsible adult care for you for at least 24 hours after you leave the hospital or clinic. This is important.  The day before your procedure  You may be given antibiotic medicine to take by mouth to help prevent infection. Take it as told by your health care provider.  You may be asked to shower with a germ-killing soap.  Follow instructions from your health care provider about eating and drinking restrictions. This includes gum, mints and hard candy.  Pack comfortable clothes according to your procedure.   The day of your procedure  You may need to take another shower with a germ-killing soap before you leave home in the morning.  With a small sip of water, take only the medicines that you are told to take.  Remove all jewelry including rings.   Leave anything you consider valuable at home except hearing aids if needed.  Do not wear any makeup, nail polish, powder, deodorant, lotion, hair accessories, or anything on your skin or body except your clothes.  If you will be staying in the hospital, bring a case to hold your glasses, contacts, or dentures. You may also want to bring your robe and non-skid footwear.  If you wear oxygen at home, bring it with you the day of surgery.  If instructed by your health care provider, bring your sleep apnea device with you on the day of your surgery (if this applies to you).  You may want to leave your suitcase and sleep apnea device in the car until after surgery.   Arrive at the hospital as scheduled.  Bring a friend or family member with you who can help to answer questions and be present while you meet with your health care provider.  At the hospital  When you arrive at the hospital:  Go to registration located at the main entrance of the hospital. You will be registered and given a beeper and a sticker sheet. Take the stickers to the Outpatient nurses desk and place in the black  tray. This is to notify staff that you have arrived. Then return to the lobby to wait.   When your beeper lights up and vibrates proceed through the double doors, under the stairs, and a member of the Outpatient Surgery staff will escort you to your preoperative room.  You may have to wear compression sleeves. These help to prevent blood clots and reduce swelling in your legs.  An IV may be inserted into one of your veins.              In the operating room, you may be given one or more of the following:        A medicine to help you relax (sedative).        A medicine to numb the area (local anesthetic).        A medicine to make you fall asleep (general anesthetic).        A medicine that is injected into an area of your body to numb everything below the                      injection site (regional anesthetic).  You may be given an antibiotic through your IV to help prevent infection.  Your surgical site will be marked or identified.    Contact a health care provider if you:  Develop a fever of more than 100.4°F (38°C) or other feelings of illness during the 48 hours before your surgery.  Have symptoms that get worse.  Have questions or concerns about your surgery.  Summary  Preparing for surgery can make the procedure go more smoothly and lower your risk of complications.  Before surgery, make a list of questions and concerns to discuss with your surgeon. Ask about the risks and possible complications.  In the days or weeks before your surgery, follow all instructions from your health care provider. You may need to stop smoking, avoid alcohol, follow eating restrictions, and change or stop your regular medicines.  Contact your surgeon if you develop a fever or other signs of illness during the few days before your surgery.  This information is not intended to replace advice given to you by your health care provider. Make sure you discuss any questions you have with your health care provider.  Document Revised:  12/21/2018 Document Reviewed: 10/23/2018  Elsevier Patient Education © 2021 [employer]

## 2022-09-27 LAB
QT INTERVAL: 426 MS
QTC INTERVAL: 450 MS

## 2022-10-03 ENCOUNTER — HOSPITAL ENCOUNTER (OUTPATIENT)
Facility: HOSPITAL | Age: 77
Setting detail: HOSPITAL OUTPATIENT SURGERY
Discharge: HOME | End: 2022-10-03
Attending: OTOLARYNGOLOGY | Admitting: OTOLARYNGOLOGY

## 2022-10-03 ENCOUNTER — ANESTHESIA (OUTPATIENT)
Dept: PERIOP | Facility: HOSPITAL | Age: 77
End: 2022-10-03

## 2022-10-03 ENCOUNTER — ANESTHESIA EVENT (OUTPATIENT)
Dept: PERIOP | Facility: HOSPITAL | Age: 77
End: 2022-10-03

## 2022-10-03 VITALS
OXYGEN SATURATION: 98 % | TEMPERATURE: 96.7 F | RESPIRATION RATE: 18 BRPM | HEART RATE: 55 BPM | DIASTOLIC BLOOD PRESSURE: 65 MMHG | SYSTOLIC BLOOD PRESSURE: 122 MMHG

## 2022-10-03 PROCEDURE — 42975 DISE EVAL SLP DO BRTH FLX DX: CPT | Performed by: OTOLARYNGOLOGY

## 2022-10-03 PROCEDURE — 25010000002 PROPOFOL 1000 MG/100ML EMULSION: Performed by: NURSE ANESTHETIST, CERTIFIED REGISTERED

## 2022-10-03 RX ORDER — LIDOCAINE HYDROCHLORIDE 10 MG/ML
0.5 INJECTION, SOLUTION EPIDURAL; INFILTRATION; INTRACAUDAL; PERINEURAL ONCE AS NEEDED
Status: DISCONTINUED | OUTPATIENT
Start: 2022-10-03 | End: 2022-10-03 | Stop reason: HOSPADM

## 2022-10-03 RX ORDER — PROPOFOL 10 MG/ML
INJECTION, EMULSION INTRAVENOUS AS NEEDED
Status: DISCONTINUED | OUTPATIENT
Start: 2022-10-03 | End: 2022-10-03 | Stop reason: SURG

## 2022-10-03 RX ORDER — SODIUM CHLORIDE, SODIUM LACTATE, POTASSIUM CHLORIDE, CALCIUM CHLORIDE 600; 310; 30; 20 MG/100ML; MG/100ML; MG/100ML; MG/100ML
INJECTION, SOLUTION INTRAVENOUS CONTINUOUS PRN
Status: DISCONTINUED | OUTPATIENT
Start: 2022-10-03 | End: 2022-10-03 | Stop reason: SURG

## 2022-10-03 RX ORDER — SODIUM CHLORIDE 0.9 % (FLUSH) 0.9 %
3 SYRINGE (ML) INJECTION AS NEEDED
Status: DISCONTINUED | OUTPATIENT
Start: 2022-10-03 | End: 2022-10-03 | Stop reason: HOSPADM

## 2022-10-03 RX ORDER — SODIUM CHLORIDE 0.9 % (FLUSH) 0.9 %
10 SYRINGE (ML) INJECTION AS NEEDED
Status: DISCONTINUED | OUTPATIENT
Start: 2022-10-03 | End: 2022-10-03 | Stop reason: HOSPADM

## 2022-10-03 RX ORDER — SODIUM CHLORIDE, SODIUM LACTATE, POTASSIUM CHLORIDE, CALCIUM CHLORIDE 600; 310; 30; 20 MG/100ML; MG/100ML; MG/100ML; MG/100ML
1000 INJECTION, SOLUTION INTRAVENOUS CONTINUOUS
Status: DISCONTINUED | OUTPATIENT
Start: 2022-10-03 | End: 2022-10-03 | Stop reason: HOSPADM

## 2022-10-03 RX ORDER — ONDANSETRON 4 MG/1
4 TABLET, FILM COATED ORAL ONCE AS NEEDED
Status: DISCONTINUED | OUTPATIENT
Start: 2022-10-03 | End: 2022-10-03 | Stop reason: HOSPADM

## 2022-10-03 RX ORDER — SODIUM CHLORIDE 9 MG/ML
100 INJECTION, SOLUTION INTRAVENOUS CONTINUOUS
Status: DISCONTINUED | OUTPATIENT
Start: 2022-10-03 | End: 2022-10-03 | Stop reason: HOSPADM

## 2022-10-03 RX ORDER — LIDOCAINE HYDROCHLORIDE 20 MG/ML
INJECTION, SOLUTION EPIDURAL; INFILTRATION; INTRACAUDAL; PERINEURAL AS NEEDED
Status: DISCONTINUED | OUTPATIENT
Start: 2022-10-03 | End: 2022-10-03 | Stop reason: SURG

## 2022-10-03 RX ORDER — SODIUM CHLORIDE 0.9 % (FLUSH) 0.9 %
10 SYRINGE (ML) INJECTION EVERY 12 HOURS SCHEDULED
Status: DISCONTINUED | OUTPATIENT
Start: 2022-10-03 | End: 2022-10-03 | Stop reason: HOSPADM

## 2022-10-03 RX ADMIN — PROPOFOL 30 MG: 10 INJECTION, EMULSION INTRAVENOUS at 11:13

## 2022-10-03 RX ADMIN — PROPOFOL 50 MG: 10 INJECTION, EMULSION INTRAVENOUS at 11:12

## 2022-10-03 RX ADMIN — LIDOCAINE HYDROCHLORIDE 60 MG: 20 INJECTION, SOLUTION EPIDURAL; INFILTRATION; INTRACAUDAL; PERINEURAL at 11:12

## 2022-10-03 RX ADMIN — PROPOFOL 30 MG: 10 INJECTION, EMULSION INTRAVENOUS at 11:15

## 2022-10-03 RX ADMIN — SODIUM CHLORIDE, POTASSIUM CHLORIDE, SODIUM LACTATE AND CALCIUM CHLORIDE: 600; 310; 30; 20 INJECTION, SOLUTION INTRAVENOUS at 11:09

## 2022-10-03 RX ADMIN — SODIUM CHLORIDE, POTASSIUM CHLORIDE, SODIUM LACTATE AND CALCIUM CHLORIDE 1000 ML: 600; 310; 30; 20 INJECTION, SOLUTION INTRAVENOUS at 08:09
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
## 2022-10-03 NOTE — OP NOTE
OPERATIVE NOTE  10/3/2022    NAME: Marily Reese    YOB: 1945  MRN: [id number]    PRE-OPERATIVE DIAGNOSIS:    LALO (obstructive sleep apnea) [G47.33]  Snoring [R06.83]  Daytime somnolence [R40.0]    POST-OPERATIVE DIAGNOSIS:   Post-Op Diagnosis Codes:     * LALO (obstructive sleep apnea) [G47.33]     * Snoring [R06.83]     * Daytime somnolence [R40.0]    PROCEDURE PERFORMED:   Drug-induced video sleep endoscopy    SURGEON:   Vishal Jacobs MD    ASSISTANT(S):   None    ANESTHESIA:   Sedation    INDICATIONS: The patient is a 77 y.o. female with LALO (obstructive sleep apnea) [G47.33]  Snoring [R06.83]  Daytime somnolence [R40.0]    PROCEDURE:  The patient was brought to the operating room, given sedation, and prepped and draped in the usual manner.     The flexible endoscope was inserted to examine both sides of the nose as well as the pharynx and larynx.     The VOTE score at baseline was nearly complete AP collapse with essentially no or very minimal lateral collapse.  With simulated jaw advancement and tongue advancement, the hypopharyngeal obstruction and secondarily the palatal collapse also improved.    Flexible fiberoptic laryngoscopy was subsequently performed and demonstrated moderate lymphoid hyperplasia at the base of the tongue with findings consistent with laryngopharyngeal reflux including moderate arytenoid edema with mild pachydermic changes in the posterior arytenoid area.  Both true vocal cords abducted and abduct normally and were normal in appearance.     In summary, there was no evidence of complete concentric palatal obstruction.    The patient was transported upon extubation to the postanesthesia care unit in stable condition.    SPECIMENS:  None    COMPLICATIONS: NONE    ESTIMATED BLOOD LOSS:  None    Vishal Jacobs MD  10/3/2022

## 2022-10-03 NOTE — ANESTHESIA POSTPROCEDURE EVALUATION
Patient: Marily Reese    Procedure Summary     Date: 10/03/22 Room / Location:  [facility] 02 /  [facility]    Anesthesia Start: 1109 Anesthesia Stop: 1125    Procedure: Videosleep endoscopy (N/A ) Diagnosis:       LALO (obstructive sleep apnea)      Snoring      Daytime somnolence      (LALO (obstructive sleep apnea) [G47.33])      (Snoring [R06.83])      (Daytime somnolence [R40.0])    Surgeons: Vishal Jacobs MD Provider: Osorio Saldana CRNA    Anesthesia Type: general ASA Status: 2          Anesthesia Type: general    Vitals  Vitals Value Taken Time   /81 10/03/22 1146   Temp 96.7 °F (35.9 °C) 10/03/22 1127   Pulse 57 10/03/22 1146   Resp 18 10/03/22 1127   SpO2 95 % 10/03/22 1146   Vitals shown include unvalidated device data.        Post Anesthesia Care and Evaluation    Patient location during evaluation: PHASE II  Patient participation: complete - patient participated  Level of consciousness: awake  Pain score: 0  Pain management: adequate    Airway patency: patent  Anesthetic complications: No anesthetic complications  PONV Status: none  Cardiovascular status: acceptable and stable  Respiratory status: acceptable  Hydration status: acceptable
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
## 2022-10-06 ENCOUNTER — OFFICE VISIT (OUTPATIENT)
Dept: OTOLARYNGOLOGY | Facility: CLINIC | Age: 77
End: 2022-10-06

## 2022-10-06 VITALS
SYSTOLIC BLOOD PRESSURE: 121 MMHG | DIASTOLIC BLOOD PRESSURE: 63 MMHG | TEMPERATURE: 98.2 F | BODY MASS INDEX: 23.64 KG/M2 | WEIGHT: 156 LBS | HEART RATE: 68 BPM | HEIGHT: 68 IN | RESPIRATION RATE: 16 BRPM

## 2022-10-06 DIAGNOSIS — G47.33 OBSTRUCTIVE SLEEP APNEA: Primary | ICD-10-CM

## 2022-10-06 PROCEDURE — 99214 OFFICE O/P EST MOD 30 MIN: CPT | Performed by: OTOLARYNGOLOGY

## 2022-10-06 RX ORDER — ROPINIROLE 0.25 MG/1
0.25 TABLET, FILM COATED ORAL NIGHTLY
Status: ON HOLD | COMMUNITY
End: 2023-01-11

## 2022-10-06 NOTE — PATIENT INSTRUCTIONS
The risk benefits and options of HYPOGLOSSAL NERVE STIMULATION DEVICE IMPLANT ([employer]) were discussed with the patient including the risks of bleeding, infection, the need for implant removal, malfunctioning, battery replacement and other issues were discussed at length.  Was also discussed that the patient needed to limit movement of the chest and arm for 10 to 12 days postoperatively in order to limit development of seroma or hematoma and therefore subsequent infection.  The patient understands risk benefits and options and wishes to proceed.    Patient and family were instructed on the proper use of scheduled prescription drugs including their impact on driving and the potential effects during pregnancy.  The potential for overdose was discussed and their safe storage and proper disposal.  The website www.[employer].[place].gov which contains other materials in this regard.

## 2022-11-11 ENCOUNTER — APPOINTMENT (OUTPATIENT)
Dept: PREADMISSION TESTING | Facility: HOSPITAL | Age: 77
End: 2022-11-11

## 2022-12-14 ENCOUNTER — PRE-ADMISSION TESTING (OUTPATIENT)
Dept: PREADMISSION TESTING | Facility: HOSPITAL | Age: 77
End: 2022-12-14

## 2022-12-14 VITALS
HEART RATE: 80 BPM | OXYGEN SATURATION: 94 % | RESPIRATION RATE: 16 BRPM | WEIGHT: 16.09 LBS | BODY MASS INDEX: 2.53 KG/M2 | HEIGHT: 67 IN | SYSTOLIC BLOOD PRESSURE: 132 MMHG | DIASTOLIC BLOOD PRESSURE: 65 MMHG

## 2022-12-14 DIAGNOSIS — G47.33 OBSTRUCTIVE SLEEP APNEA: ICD-10-CM

## 2022-12-14 LAB
ANION GAP SERPL CALCULATED.3IONS-SCNC: 9 MMOL/L (ref 5–15)
BUN SERPL-MCNC: 16 MG/DL (ref 8–23)
BUN/CREAT SERPL: 18 (ref 7–25)
CALCIUM SPEC-SCNC: 8.9 MG/DL (ref 8.6–10.5)
CHLORIDE SERPL-SCNC: 106 MMOL/L (ref 98–107)
CO2 SERPL-SCNC: 28 MMOL/L (ref 22–29)
CREAT SERPL-MCNC: 0.89 MG/DL (ref 0.57–1)
DEPRECATED RDW RBC AUTO: 48 FL (ref 37–54)
EGFRCR SERPLBLD CKD-EPI 2021: 67.7 ML/MIN/1.73
ERYTHROCYTE [DISTWIDTH] IN BLOOD BY AUTOMATED COUNT: 13.6 % (ref 12.3–15.4)
GLUCOSE SERPL-MCNC: 110 MG/DL (ref 65–99)
HCT VFR BLD AUTO: 37.3 % (ref 34–46.6)
HGB BLD-MCNC: 11.6 G/DL (ref 12–15.9)
MCH RBC QN AUTO: 29.9 PG (ref 26.6–33)
MCHC RBC AUTO-ENTMCNC: 31.1 G/DL (ref 31.5–35.7)
MCV RBC AUTO: 96.1 FL (ref 79–97)
PLATELET # BLD AUTO: 141 10*3/MM3 (ref 140–450)
PMV BLD AUTO: 12.7 FL (ref 6–12)
POTASSIUM SERPL-SCNC: 3.8 MMOL/L (ref 3.5–5.2)
RBC # BLD AUTO: 3.88 10*6/MM3 (ref 3.77–5.28)
SODIUM SERPL-SCNC: 143 MMOL/L (ref 136–145)
WBC NRBC COR # BLD: 4.84 10*3/MM3 (ref 3.4–10.8)

## 2022-12-14 PROCEDURE — 80048 BASIC METABOLIC PNL TOTAL CA: CPT

## 2022-12-14 PROCEDURE — 85027 COMPLETE CBC AUTOMATED: CPT

## 2022-12-14 PROCEDURE — 36415 COLL VENOUS BLD VENIPUNCTURE: CPT

## 2022-12-14 NOTE — DISCHARGE INSTRUCTIONS
Before you come to the hospital        Arrival time: AS DIRECTED BY OFFICE     YOU MAY TAKE THE FOLLOWING MEDICATION(S) THE MORNING OF SURGERY WITH A SIP OF WATER: ***none           ALL OTHER HOME MEDICATION CHECK WITH YOUR PHYSICIAN (especially if   you are taking diabetes medicines or blood thinners)    Do not take any Erectile Dysfunction medications (EX: CIALIS, VIAGRA) 24 hours prior to surgery.      If you were given and instructed to use a germ- killing soap, use as directed the night before surgery and again the morning of surgery or as directed by your surgeon. (Use one-half of the bottle with each shower.)   See attached information for How to Use Chlorhexidine for Bathing if applicable.            Eating and drinking restrictions prior to scheduled arrival time    2 Hours before arrival time STOP   Drinking Clear liquids (water, apple juice-no pulp)     6 Hours before arrival time STOP   Milk or drinks that contain milk, full liquids    6 Hours before arrival time STOP   Light meals or foods, such as toast or cereal    8 Hours before arrival time STOP   Heavy foods, such as meat, fried foods, or fatty foods    (It is extremely important that you follow these guidelines to prevent delay or cancelation of your procedure)     Clear Liquids  Water and flavored water                                                                      Clear Fruit juices, such as cranberry juice and apple juice.  Black coffee (NO cream of any kind, including powdered).  Plain tea  Clear bouillon or broth.  Flavored gelatin.  Soda.  Gatorade or Powerade.  Full liquid examples  Juices that have pulp.  Frozen ice pops that contain fruit pieces.  Coffee with creamer  Milk.  Yogurt.                MANAGING PAIN AFTER SURGERY    We know you are probably wondering what your pain will be like after surgery.  Following surgery it is unrealistic to expect you will not have pain.   Pain is how our bodies let us know that something is  wrong or cautions us to be careful.  That said, our goal is to make your pain tolerable.    Methods we may use to treat your pain include (oral or IV medications, PCAs, epidurals, nerve blocks, etc.)   While some procedures require IV pain medications for a short time after surgery, transitioning to pain medications by mouth allows for better management of pain.   Your nurse will encourage you to take oral pain medications whenever possible.  IV medications work almost immediately, but only last a short while.  Taking medications by mouth allows for a more constant level of medication in your blood stream for a longer period of time.      Once your pain is out of control it is harder to get back under control.  It is important you are aware when your next dose of pain medication is due.  If you are admitted, your nurse may write the time of your next dose on the white board in your room to help you remember.      We are interested in your pain and encourage you to inform us about aggravating factors during your visit.   Many times a simple repositioning every few hours can make a big difference.    If your physician says it is okay, do not let your pain prevent you from getting out of bed. Be sure to call your nurse for assistance prior to getting up so you do not fall.      Before surgery, please decide your tolerable pain goal.  These faces help describe the pain ratings we use on a 0-10 scale.   Be prepared to tell us your goal and whether or not you take pain or anxiety medications at home.          Preparing for Surgery  Preparing for surgery is an important part of your care. It can make things go more smoothly and help you avoid complications. The steps leading up to surgery may vary among hospitals. Follow all instructions given to you by your health care providers. Ask questions if you do not understand something. Talk about any concerns that you have.  Here are some questions to consider asking before your  surgery:  If my surgery is not an emergency (is elective), when would be the best time to have the surgery?  What arrangements do I need to make for work, home, or school?  What will my recovery be like? How long will it be before I can return to normal activities?  Will I need to prepare my home? Will I need to arrange care for me or my children?  Should I expect to have pain after surgery? What are my pain management options? Are there nonmedical options that I can try for pain?  Tell a health care provider about:  Any allergies you have.  All medicines you are taking, including vitamins, herbs, eye drops, creams, and over-the-counter medicines.  Any problems you or family members have had with anesthetic medicines.  Any blood disorders you have.  Any surgeries you have had.  Any medical conditions you have.  Whether you are pregnant or may be pregnant.  What are the risks?  The risks and complications of surgery depend on the specific procedure that you have. Discuss all the risks with your health care providers before your surgery. Ask about common surgical complications, which may include:  Infection.  Bleeding or a need for blood replacement (transfusion).  Allergic reactions to medicines.  Damage to surrounding nerves, tissues, or structures.  A blood clot.  Scarring.  Failure of the surgery to correct the problem.  Follow these instructions before the procedure:  Several days or weeks before your procedure  You may have a physical exam by your primary health care provider to make sure it is safe for you to have surgery.  You may have testing. This may include a chest X-ray, blood and urine tests, electrocardiogram (ECG), or other testing.  Ask your health care provider about:  Changing or stopping your regular medicines. This is especially important if you are taking diabetes medicines or blood thinners.  Taking medicines such as aspirin and ibuprofen. These medicines can thin your blood. Do not take these  medicines unless your health care provider tells you to take them.  Taking over-the-counter medicines, vitamins, herbs, and supplements.  Do not use any products that contain nicotine or tobacco, such as cigarettes and e-cigarettes. If you need help quitting, ask your health care provider.  Avoid alcohol.  Ask your health care provider if there are exercises you can do to prepare for surgery.  Eat a healthy diet.   Plan to have someone take you home from the hospital or clinic.  Plan to have a responsible adult care for you for at least 24 hours after you leave the hospital or clinic. This is important.  The day before your procedure  You may be given antibiotic medicine to take by mouth to help prevent infection. Take it as told by your health care provider.  You may be asked to shower with a germ-killing soap.  Follow instructions from your health care provider about eating and drinking restrictions. This includes gum, mints and hard candy.  Pack comfortable clothes according to your procedure.   The day of your procedure  You may need to take another shower with a germ-killing soap before you leave home in the morning.  With a small sip of water, take only the medicines that you are told to take.  Remove all jewelry including rings.   Leave anything you consider valuable at home except hearing aids if needed.  You do not need to bring your home medications into the hospital.   Do not wear any makeup, nail polish, powder, deodorant, lotion, hair accessories, or anything on your skin or body except your clothes.  If you will be staying in the hospital, bring a case to hold your glasses, contacts, or dentures. You may also want to bring your robe and non-skid footwear.       (Do not use denture adhesives since you will be asked to remove them during  surgery).   If you wear oxygen at home, bring it with you the day of surgery.  If instructed by your health care provider, bring your sleep apnea device with you on the  day of your surgery (if this applies to you).  You may want to leave your suitcase and sleep apnea device in the car until after surgery.   Arrive at the hospital as scheduled.  Bring a friend or family member with you who can help to answer questions and be present while you meet with your health care provider.  At the hospital  When you arrive at the hospital:  Go to registration located at the main entrance of the hospital. You will be registered and given a beeper and a sticker sheet. Take the stickers to the Outpatient nurses desk and place in the black tray. This is to notify staff that you have arrived. Then return to the lobby to wait.   When your beeper lights up and vibrates proceed through the double doors, under the stairs, and a member of the Outpatient Surgery staff will escort you to your preoperative room.  You may have to wear compression sleeves. These help to prevent blood clots and reduce swelling in your legs.  An IV may be inserted into one of your veins.              In the operating room, you may be given one or more of the following:        A medicine to help you relax (sedative).        A medicine to numb the area (local anesthetic).        A medicine to make you fall asleep (general anesthetic).        A medicine that is injected into an area of your body to numb everything below the                      injection site (regional anesthetic).  You may be given an antibiotic through your IV to help prevent infection.  Your surgical site will be marked or identified.    Contact a health care provider if you:  Develop a fever of more than 100.4°F (38°C) or other feelings of illness during the 48 hours before your surgery.  Have symptoms that get worse.  Have questions or concerns about your surgery.  Summary  Preparing for surgery can make the procedure go more smoothly and lower your risk of complications.  Before surgery, make a list of questions and concerns to discuss with your surgeon.  Ask about the risks and possible complications.  In the days or weeks before your surgery, follow all instructions from your health care provider. You may need to stop smoking, avoid alcohol, follow eating restrictions, and change or stop your regular medicines.  Contact your surgeon if you develop a fever or other signs of illness during the few days before your surgery.  This information is not intended to replace advice given to you by your health care provider. Make sure you discuss any questions you have with your health care provider.  Document Revised: 12/21/2018 Document Reviewed: 10/23/2018  Elsevier Patient Education © 2021 [employer]

## 2023-01-11 ENCOUNTER — HOSPITAL ENCOUNTER (OUTPATIENT)
Facility: HOSPITAL | Age: 78
Setting detail: HOSPITAL OUTPATIENT SURGERY
Discharge: HOME | End: 2023-01-11
Attending: OTOLARYNGOLOGY | Admitting: OTOLARYNGOLOGY
Payer: MEDICARE

## 2023-01-11 ENCOUNTER — ANESTHESIA EVENT (OUTPATIENT)
Dept: PERIOP | Facility: HOSPITAL | Age: 78
End: 2023-01-11
Payer: MEDICARE

## 2023-01-11 ENCOUNTER — ANESTHESIA (OUTPATIENT)
Dept: PERIOP | Facility: HOSPITAL | Age: 78
End: 2023-01-11
Payer: MEDICARE

## 2023-01-11 VITALS
HEART RATE: 61 BPM | OXYGEN SATURATION: 97 % | DIASTOLIC BLOOD PRESSURE: 64 MMHG | RESPIRATION RATE: 18 BRPM | BODY MASS INDEX: 24.61 KG/M2 | TEMPERATURE: 98 F | WEIGHT: 159.39 LBS | SYSTOLIC BLOOD PRESSURE: 117 MMHG

## 2023-01-11 DIAGNOSIS — G47.33 OSA (OBSTRUCTIVE SLEEP APNEA): Primary | ICD-10-CM

## 2023-01-11 PROCEDURE — 25010000002 PROPOFOL 10 MG/ML EMULSION: Performed by: NURSE ANESTHETIST, CERTIFIED REGISTERED

## 2023-01-11 PROCEDURE — C1778 LEAD, NEUROSTIMULATOR: HCPCS | Performed by: OTOLARYNGOLOGY

## 2023-01-11 PROCEDURE — 64582 OPN MPLTJ HPGLSL NSTM ARY PG: CPT | Performed by: OTOLARYNGOLOGY

## 2023-01-11 PROCEDURE — 25010000002 FENTANYL CITRATE (PF) 100 MCG/2ML SOLUTION: Performed by: NURSE ANESTHETIST, CERTIFIED REGISTERED

## 2023-01-11 PROCEDURE — 25010000002 CEFAZOLIN PER 500 MG: Performed by: NURSE ANESTHETIST, CERTIFIED REGISTERED

## 2023-01-11 PROCEDURE — 25010000002 DEXAMETHASONE PER 1 MG: Performed by: NURSE ANESTHETIST, CERTIFIED REGISTERED

## 2023-01-11 PROCEDURE — C1787 PATIENT PROGR, NEUROSTIM: HCPCS | Performed by: OTOLARYNGOLOGY

## 2023-01-11 PROCEDURE — 25010000002 ONDANSETRON PER 1 MG: Performed by: NURSE ANESTHETIST, CERTIFIED REGISTERED

## 2023-01-11 PROCEDURE — C1767 GENERATOR, NEURO NON-RECHARG: HCPCS | Performed by: OTOLARYNGOLOGY

## 2023-01-11 DEVICE — GEN IPG INSPIRE4 RESP/SENSR/LD NONRECHG: Type: IMPLANTABLE DEVICE | Site: NECK | Status: FUNCTIONAL

## 2023-01-11 DEVICE — LD SENSR IPG INSPIRE RESP 45CM 3.6MM: Type: IMPLANTABLE DEVICE | Site: NECK | Status: FUNCTIONAL

## 2023-01-11 DEVICE — LD STIM IPG INSPIRE 3/ELECTRD 45CM: Type: IMPLANTABLE DEVICE | Site: NECK | Status: FUNCTIONAL

## 2023-01-11 RX ORDER — LIDOCAINE HYDROCHLORIDE 10 MG/ML
0.5 INJECTION, SOLUTION EPIDURAL; INFILTRATION; INTRACAUDAL; PERINEURAL ONCE AS NEEDED
Status: DISCONTINUED | OUTPATIENT
Start: 2023-01-11 | End: 2023-01-11

## 2023-01-11 RX ORDER — CEFUROXIME AXETIL 500 MG/1
500 TABLET ORAL 2 TIMES DAILY
Qty: 20 TABLET | Refills: 0 | Status: SHIPPED | OUTPATIENT
Start: 2023-01-11 | End: 2023-01-21

## 2023-01-11 RX ORDER — LIDOCAINE HYDROCHLORIDE AND EPINEPHRINE 10; 10 MG/ML; UG/ML
INJECTION, SOLUTION INFILTRATION; PERINEURAL AS NEEDED
Status: DISCONTINUED | OUTPATIENT
Start: 2023-01-11 | End: 2023-01-11 | Stop reason: HOSPADM

## 2023-01-11 RX ORDER — IBUPROFEN 600 MG/1
600 TABLET ORAL ONCE AS NEEDED
Status: COMPLETED | OUTPATIENT
Start: 2023-01-11 | End: 2023-01-11

## 2023-01-11 RX ORDER — FENTANYL CITRATE 50 UG/ML
25 INJECTION, SOLUTION INTRAMUSCULAR; INTRAVENOUS
Status: DISCONTINUED | OUTPATIENT
Start: 2023-01-11 | End: 2023-01-11 | Stop reason: HOSPADM

## 2023-01-11 RX ORDER — HYDROCODONE BITARTRATE AND ACETAMINOPHEN 5; 325 MG/1; MG/1
1 TABLET ORAL EVERY 8 HOURS PRN
Qty: 8 TABLET | Refills: 0 | Status: SHIPPED | OUTPATIENT
Start: 2023-01-11 | End: 2023-02-07

## 2023-01-11 RX ORDER — HYDROCODONE BITARTRATE AND ACETAMINOPHEN 5; 325 MG/1; MG/1
1 TABLET ORAL ONCE AS NEEDED
Status: DISCONTINUED | OUTPATIENT
Start: 2023-01-11 | End: 2023-01-11 | Stop reason: HOSPADM

## 2023-01-11 RX ORDER — ROCURONIUM BROMIDE 10 MG/ML
INJECTION, SOLUTION INTRAVENOUS AS NEEDED
Status: DISCONTINUED | OUTPATIENT
Start: 2023-01-11 | End: 2023-01-11 | Stop reason: SURG

## 2023-01-11 RX ORDER — SUCCINYLCHOLINE/SOD CL,ISO/PF 200MG/10ML
SYRINGE (ML) INTRAVENOUS AS NEEDED
Status: DISCONTINUED | OUTPATIENT
Start: 2023-01-11 | End: 2023-01-11 | Stop reason: SURG

## 2023-01-11 RX ORDER — ONDANSETRON 4 MG/1
4 TABLET, FILM COATED ORAL ONCE AS NEEDED
Status: DISCONTINUED | OUTPATIENT
Start: 2023-01-11 | End: 2023-01-11 | Stop reason: HOSPADM

## 2023-01-11 RX ORDER — SODIUM CHLORIDE, SODIUM LACTATE, POTASSIUM CHLORIDE, CALCIUM CHLORIDE 600; 310; 30; 20 MG/100ML; MG/100ML; MG/100ML; MG/100ML
9 INJECTION, SOLUTION INTRAVENOUS CONTINUOUS
Status: DISCONTINUED | OUTPATIENT
Start: 2023-01-11 | End: 2023-01-11 | Stop reason: HOSPADM

## 2023-01-11 RX ORDER — DROPERIDOL 2.5 MG/ML
0.62 INJECTION, SOLUTION INTRAMUSCULAR; INTRAVENOUS ONCE AS NEEDED
Status: DISCONTINUED | OUTPATIENT
Start: 2023-01-11 | End: 2023-01-11 | Stop reason: HOSPADM

## 2023-01-11 RX ORDER — CEFAZOLIN SODIUM 1 G/3ML
INJECTION, POWDER, FOR SOLUTION INTRAMUSCULAR; INTRAVENOUS AS NEEDED
Status: DISCONTINUED | OUTPATIENT
Start: 2023-01-11 | End: 2023-01-11 | Stop reason: SURG

## 2023-01-11 RX ORDER — PROPOFOL 10 MG/ML
VIAL (ML) INTRAVENOUS AS NEEDED
Status: DISCONTINUED | OUTPATIENT
Start: 2023-01-11 | End: 2023-01-11 | Stop reason: SURG

## 2023-01-11 RX ORDER — GLYCOPYRROLATE 0.2 MG/ML
INJECTION INTRAMUSCULAR; INTRAVENOUS AS NEEDED
Status: DISCONTINUED | OUTPATIENT
Start: 2023-01-11 | End: 2023-01-11 | Stop reason: SURG

## 2023-01-11 RX ORDER — LIDOCAINE HYDROCHLORIDE 10 MG/ML
0.5 INJECTION, SOLUTION EPIDURAL; INFILTRATION; INTRACAUDAL; PERINEURAL ONCE AS NEEDED
Status: DISCONTINUED | OUTPATIENT
Start: 2023-01-11 | End: 2023-01-11 | Stop reason: HOSPADM

## 2023-01-11 RX ORDER — NALOXONE HCL 0.4 MG/ML
0.4 VIAL (ML) INJECTION AS NEEDED
Status: DISCONTINUED | OUTPATIENT
Start: 2023-01-11 | End: 2023-01-11 | Stop reason: HOSPADM

## 2023-01-11 RX ORDER — MAGNESIUM HYDROXIDE 1200 MG/15ML
LIQUID ORAL AS NEEDED
Status: DISCONTINUED | OUTPATIENT
Start: 2023-01-11 | End: 2023-01-11 | Stop reason: HOSPADM

## 2023-01-11 RX ORDER — DEXTROSE MONOHYDRATE 25 G/50ML
12.5 INJECTION, SOLUTION INTRAVENOUS AS NEEDED
Status: DISCONTINUED | OUTPATIENT
Start: 2023-01-11 | End: 2023-01-11 | Stop reason: HOSPADM

## 2023-01-11 RX ORDER — EPHEDRINE SULFATE 50 MG/ML
INJECTION, SOLUTION INTRAVENOUS AS NEEDED
Status: DISCONTINUED | OUTPATIENT
Start: 2023-01-11 | End: 2023-01-11 | Stop reason: SURG

## 2023-01-11 RX ORDER — ONDANSETRON 2 MG/ML
4 INJECTION INTRAMUSCULAR; INTRAVENOUS ONCE AS NEEDED
Status: DISCONTINUED | OUTPATIENT
Start: 2023-01-11 | End: 2023-01-11 | Stop reason: HOSPADM

## 2023-01-11 RX ORDER — FLUMAZENIL 0.1 MG/ML
0.2 INJECTION INTRAVENOUS AS NEEDED
Status: DISCONTINUED | OUTPATIENT
Start: 2023-01-11 | End: 2023-01-11 | Stop reason: HOSPADM

## 2023-01-11 RX ORDER — SODIUM CHLORIDE 0.9 % (FLUSH) 0.9 %
3 SYRINGE (ML) INJECTION AS NEEDED
Status: DISCONTINUED | OUTPATIENT
Start: 2023-01-11 | End: 2023-01-11 | Stop reason: HOSPADM

## 2023-01-11 RX ORDER — SODIUM CHLORIDE 0.9 % (FLUSH) 0.9 %
10 SYRINGE (ML) INJECTION AS NEEDED
Status: DISCONTINUED | OUTPATIENT
Start: 2023-01-11 | End: 2023-01-11 | Stop reason: HOSPADM

## 2023-01-11 RX ORDER — FENTANYL CITRATE 50 UG/ML
INJECTION, SOLUTION INTRAMUSCULAR; INTRAVENOUS AS NEEDED
Status: DISCONTINUED | OUTPATIENT
Start: 2023-01-11 | End: 2023-01-11 | Stop reason: SURG

## 2023-01-11 RX ORDER — SODIUM CHLORIDE, SODIUM LACTATE, POTASSIUM CHLORIDE, CALCIUM CHLORIDE 600; 310; 30; 20 MG/100ML; MG/100ML; MG/100ML; MG/100ML
1000 INJECTION, SOLUTION INTRAVENOUS CONTINUOUS
Status: DISCONTINUED | OUTPATIENT
Start: 2023-01-11 | End: 2023-01-11 | Stop reason: HOSPADM

## 2023-01-11 RX ORDER — LABETALOL HYDROCHLORIDE 5 MG/ML
5 INJECTION, SOLUTION INTRAVENOUS
Status: DISCONTINUED | OUTPATIENT
Start: 2023-01-11 | End: 2023-01-11 | Stop reason: HOSPADM

## 2023-01-11 RX ORDER — MULTIVITAMIN WITH IRON
250 TABLET ORAL DAILY
Status: ON HOLD | COMMUNITY
End: 2023-06-27

## 2023-01-11 RX ORDER — ONDANSETRON 2 MG/ML
INJECTION INTRAMUSCULAR; INTRAVENOUS AS NEEDED
Status: DISCONTINUED | OUTPATIENT
Start: 2023-01-11 | End: 2023-01-11 | Stop reason: SURG

## 2023-01-11 RX ORDER — OXYCODONE AND ACETAMINOPHEN 7.5; 325 MG/1; MG/1
2 TABLET ORAL EVERY 4 HOURS PRN
Status: DISCONTINUED | OUTPATIENT
Start: 2023-01-11 | End: 2023-01-11 | Stop reason: HOSPADM

## 2023-01-11 RX ORDER — OXYCODONE AND ACETAMINOPHEN 10; 325 MG/1; MG/1
1 TABLET ORAL ONCE AS NEEDED
Status: COMPLETED | OUTPATIENT
Start: 2023-01-11 | End: 2023-01-11

## 2023-01-11 RX ORDER — HYDROCODONE BITARTRATE AND ACETAMINOPHEN 5; 325 MG/1; MG/1
1 TABLET ORAL EVERY 4 HOURS PRN
Qty: 8 TABLET | Refills: 0 | Status: SHIPPED | OUTPATIENT
Start: 2023-01-11 | End: 2023-02-07

## 2023-01-11 RX ORDER — DEXAMETHASONE SODIUM PHOSPHATE 4 MG/ML
INJECTION, SOLUTION INTRA-ARTICULAR; INTRALESIONAL; INTRAMUSCULAR; INTRAVENOUS; SOFT TISSUE AS NEEDED
Status: DISCONTINUED | OUTPATIENT
Start: 2023-01-11 | End: 2023-01-11 | Stop reason: SURG

## 2023-01-11 RX ORDER — SODIUM CHLORIDE 0.9 % (FLUSH) 0.9 %
10 SYRINGE (ML) INJECTION EVERY 12 HOURS SCHEDULED
Status: DISCONTINUED | OUTPATIENT
Start: 2023-01-11 | End: 2023-01-11 | Stop reason: HOSPADM

## 2023-01-11 RX ADMIN — DEXAMETHASONE SODIUM PHOSPHATE 4 MG: 4 INJECTION, SOLUTION INTRA-ARTICULAR; INTRALESIONAL; INTRAMUSCULAR; INTRAVENOUS; SOFT TISSUE at 09:12

## 2023-01-11 RX ADMIN — EPHEDRINE SULFATE 10 MG: 50 INJECTION INTRAVENOUS at 08:18

## 2023-01-11 RX ADMIN — PROPOFOL INJECTABLE EMULSION 120 MG: 10 INJECTION, EMULSION INTRAVENOUS at 08:12

## 2023-01-11 RX ADMIN — SODIUM CHLORIDE, POTASSIUM CHLORIDE, SODIUM LACTATE AND CALCIUM CHLORIDE 1000 ML: 600; 310; 30; 20 INJECTION, SOLUTION INTRAVENOUS at 06:47

## 2023-01-11 RX ADMIN — EPHEDRINE SULFATE 10 MG: 50 INJECTION INTRAVENOUS at 10:13

## 2023-01-11 RX ADMIN — FENTANYL CITRATE 25 MCG: 50 INJECTION INTRAMUSCULAR; INTRAVENOUS at 09:09

## 2023-01-11 RX ADMIN — ONDANSETRON 4 MG: 2 INJECTION INTRAMUSCULAR; INTRAVENOUS at 09:12

## 2023-01-11 RX ADMIN — OXYCODONE AND ACETAMINOPHEN 1 TABLET: 325; 10 TABLET ORAL at 10:53

## 2023-01-11 RX ADMIN — GLYCOPYRROLATE 0.4 MCG: 0.2 INJECTION INTRAMUSCULAR; INTRAVENOUS at 08:17

## 2023-01-11 RX ADMIN — EPHEDRINE SULFATE 10 MG: 50 INJECTION INTRAVENOUS at 08:15

## 2023-01-11 RX ADMIN — CEFAZOLIN 1 G: 1 INJECTION, POWDER, FOR SOLUTION INTRAMUSCULAR; INTRAVENOUS at 08:16

## 2023-01-11 RX ADMIN — ROCURONIUM BROMIDE 5 MG: 10 SOLUTION INTRAVENOUS at 08:12

## 2023-01-11 RX ADMIN — IBUPROFEN 600 MG: 600 TABLET, FILM COATED ORAL at 11:17

## 2023-01-11 RX ADMIN — Medication 160 MG: at 08:12

## 2023-01-11 RX ADMIN — PROPOFOL INJECTABLE EMULSION 50 MG: 10 INJECTION, EMULSION INTRAVENOUS at 09:09

## 2023-01-11 RX ADMIN — FENTANYL CITRATE 75 MCG: 50 INJECTION INTRAMUSCULAR; INTRAVENOUS at 08:12

## 2023-01-11 NOTE — ANESTHESIA PROCEDURE NOTES
Airway  Urgency: elective    Date/Time: 1/11/2023 8:13 AM  Airway not difficult    General Information and Staff    Patient location during procedure: OR  CRNA/CAA: Osorio Saldana CRNA    Indications and Patient Condition  Indications for airway management: airway protection    Preoxygenated: yes  Mask difficulty assessment: 1 - vent by mask    Final Airway Details  Final airway type: endotracheal airway      Successful airway: ETT  Cuffed: yes   Successful intubation technique: direct laryngoscopy  Endotracheal tube insertion site: oral  Blade: Corazon  Blade size: 3.5  ETT size (mm): 7.0  Cormack-Lehane Classification: grade I - full view of glottis  Placement verified by: chest auscultation and capnometry   Cuff volume (mL): 6  Measured from: lips  ETT/EBT  to lips (cm): 20  Number of attempts at approach: 1  Assessment: lips, teeth, and gum same as pre-op and atraumatic intubation
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
## 2023-01-11 NOTE — OP NOTE
OPERATIVE NOTE  1/11/2023    NAME: Marily Reese    YOB: 1945  MRN: [id number]    PRE-OPERATIVE DIAGNOSIS:    Obstructive sleep apnea [G47.33]    POST-OPERATIVE DIAGNOSIS:   Post-Op Diagnosis Codes:     * Obstructive sleep apnea [G47.33]    PROCEDURE PERFORMED:   12th cranial nerve (hypoglossal) stimulation implant with placement of chest wall respiratory sensor (CPT 11622).    SURGEON:   Vishal Jacobs MD    ASSISTANT(S):   None    ANESTHESIA:   General Anesthesia via Endotracheal Tube    INDICATIONS: The patient is a 77 y.o. female with Obstructive sleep apnea [G47.33]    PROCEDURE:  The patient was brought to the operating room, given General Anesthesia via Endotracheal Tube, and prepped and draped in the usual manner.     Prior to prepping and draping, electrodes were placed in the genioglossus and hyoglossus muscle and connected to the NIM box for intraoperative nerve monitoring.  The patient was subsequently prepped and draped in the usual fashion.    A modified sub-mandibular incision was made in the right upper neck approximately 2 cm below the mandible. Dissection was carried down through the subcutaneous tissue and platysma. The anterior/inferior border of the submandibular gland was identified as well as the digastric tendon. The submandibular gland and the overlying fascia with the marginal mandibular nerve were retracted posteriorly. The digastric tendon was retracted inferiorly. Dissection continued down into the digastric triangle and the posterior border of the mylohyoid muscle was freed up and retracted anteriorly. With balanced retraction, the hypoglossal nerve was identified in its usual fashion and was dissected up towards the floor of the mouth. The superior/posterior branches innervating the hyoglossus muscle were identified using the NIM stimulator and anatomical cues. The cuff electrode for the hypoglossal nerve stimulator was placed distally to these branches  innervating genioglossus, transverse, and vertical muscles. The stimulation lead was anchored to the digastric tendon using two 3.0 silk sutures and lead body slack between the cuff and the anchor gently tucked deep to the submandibular gland.    A second 5 cm incision was made in the right upper chest over the second intercostal space, approximately 3cm lateral to the sternal margin. Dissection was carried down through the skin and subcutaneous tissue to the fascia of the pectoralis muscle. An inferior pocket for the generator was created deep to the subcutaneous layer and superficial to the fascia of the pectoralis muscle. The pectoralis major fascia was dissected directly over the second intercostal space with subsequent blunt dissection through the muscle. The pectoralis major/minor was then retracted to expose the fatty layer just superficial to the external intercostals. The fatty layer was carefully swept away to expose the external intercostal muscles. A throw-down base knot was placed to the fascia of the external intercostals just lateral to the anterior external membrane using 3.0 silk suture. A fasciotomy through the external intercostals was performed approximately 5 mm lateral to the suture knot and the respiratory sense lead (; ) was advanced with the sensor facing the pleura into the interfascial plane between the external and internal intercostals. The primary anchor was sutured into place with 3.0 silk on the external intercostals. The secondary anchor was sutured with 3.0 silk to the pectoralis major allowing adequate slack between the anchors.    The stimulation lead was then tunneled in a subplatysmal plane with blunt dissection under direct visualization and brought out into the sub-clavicular pocket where both the stimulation lead and the respiratory sensing lead were connected to the implantable pulse generator, using the two person, three-handed approach.    The implantable pulse  generator was placed in the subclavicular pocket ensuring lead body was deep to the generator and secured with use of air knots to the pectoralis fascia using 2.0 silk sutures.  Diagnostic evaluation did not demonstrate any exclusion branches to be still present in the cuff.  Subsequent diagnostic evaluation confirmed good placement of the stimulation cuff as demonstrated by activation of the genioglossus and transverse and vertical muscles, resulting in unhindered, stiffened tongue protrusion, confirmed visually. Diagnostic evaluation also confirmed good respiratory sensor placement as demonstrated by a sensing waveform with good rise and fall associated with patient respirations.    All the wounds were thoroughly irrigated and closed in three layers with deep 4-0 Monocryl in a running subcuticular stitch of 4-0 Monocryl to reapproximate the epidermis. Mastisol and Steri-Strips were applied followed by Bactroban ointment and sterile pressure dressings consisting of 4 x 4's and Tegaderm.    The patient was then awakened, extubated, and transferred to Recovery Room in stable condition.     SPECIMENS:  None    COMPLICATIONS: NONE    ESTIMATED BLOOD LOSS:  < 25 cc    Vishal Jacobs MD  1/11/2023
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
## 2023-01-11 NOTE — ANESTHESIA POSTPROCEDURE EVALUATION
Patient: Marily Reese    Procedure Summary     Date: 01/11/23 Room / Location:  [facility] OR 02 /  [facility]    Anesthesia Start: 0806 Anesthesia Stop: 1038    Procedure: HYPOGLOSSAL NERVE STIMULATION DEVICE IMPLANT (Right: Neck) Diagnosis:       Obstructive sleep apnea      (Obstructive sleep apnea [G47.33])    Surgeons: Vishal Jacobs MD Provider: Osorio Saldana CRNA    Anesthesia Type: general ASA Status: 2          Anesthesia Type: general    Vitals  Vitals Value Taken Time   /66 01/11/23 1053   Temp 98 °F (36.7 °C) 01/11/23 1053   Pulse 0 01/11/23 1057   Resp 15 01/11/23 1053   SpO2 91 % 01/11/23 1057   Vitals shown include unvalidated device data.        Post Anesthesia Care and Evaluation    Patient location during evaluation: PACU  Patient participation: complete - patient participated  Level of consciousness: awake and alert  Pain management: adequate    Airway patency: patent  Anesthetic complications: No anesthetic complications    Cardiovascular status: acceptable  Respiratory status: acceptable  Hydration status: acceptable    Comments: Blood pressure 117/64, pulse 61, temperature 98 °F (36.7 °C), temperature source Temporal, resp. rate 18, weight 72.3 kg (159 lb 6.3 oz), SpO2 97 %, not currently breastfeeding.    Pt discharged from PACU based on marshal score >8

## 2023-02-07 ENCOUNTER — OFFICE VISIT (OUTPATIENT)
Dept: OTOLARYNGOLOGY | Facility: CLINIC | Age: 78
End: 2023-02-07
Payer: MEDICARE

## 2023-02-07 VITALS
DIASTOLIC BLOOD PRESSURE: 74 MMHG | HEIGHT: 68 IN | SYSTOLIC BLOOD PRESSURE: 134 MMHG | RESPIRATION RATE: 16 BRPM | HEART RATE: 62 BPM | TEMPERATURE: 97.5 F | BODY MASS INDEX: 24.1 KG/M2 | WEIGHT: 159 LBS

## 2023-02-07 DIAGNOSIS — K21.9 LARYNGOPHARYNGEAL REFLUX (LPR): ICD-10-CM

## 2023-02-07 DIAGNOSIS — R06.83 SNORING: ICD-10-CM

## 2023-02-07 DIAGNOSIS — G47.33 OBSTRUCTIVE SLEEP APNEA: Primary | ICD-10-CM

## 2023-02-07 DIAGNOSIS — R40.0 DAYTIME SOMNOLENCE: ICD-10-CM

## 2023-02-07 PROCEDURE — 99024 POSTOP FOLLOW-UP VISIT: CPT | Performed by: NURSE PRACTITIONER

## 2023-02-07 RX ORDER — NICOTINE POLACRILEX 4 MG/1
20 GUM, CHEWING ORAL 2 TIMES DAILY
Qty: 60 EACH | Refills: 3 | Status: SHIPPED | OUTPATIENT
Start: 2023-02-07 | End: 2023-03-09
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
## 2023-02-07 NOTE — PATIENT INSTRUCTIONS
Omeprazole before breakfast and dinner  Reflux precautions   Call with any new/worsening problems or concerns    Gastroesophageal Reflux Disease (Laryngopharyngeal Reflux), Adult  Gastroesophageal reflux disease (GERD) and/or Laryngopharyngeal Reflux, (LPR) happens when acid from your stomach flows up into the esophagus and/or throat and voicebox or larynx. When acid comes in contact with the these organs, the acid can cause soreness (inflammation). Over time, GERD may create small holes (ulcers) in the lining of the esophagus and may lead to the development of hoarseness, difficulty swallowing,   feeling of something stuck in the throat, increased mucous or drainage and even predispose to the development of malignancies, (cancer).    CAUSES   Increased body weight. This puts pressure on the stomach, making acid rise from the stomach into the esophagus.  Smoking. This increases acid production in the stomach.  Drinking alcohol. This causes decreased pressure in the lower esophageal sphincter (valve or ring of muscle between the esophagus and stomach), allowing acid from the stomach into the esophagus.  Late evening meals and a full stomach. This increases pressure and acid production in the stomach.  A malformed lower esophageal sphincter  Diet which can include avoidance of gluten and dairy products  Age  SYMPTOMS   Burning pain in the lower part of the mid-chest behind the breastbone and in the mid-stomach area. This may occur twice a week or more often.  Trouble swallowing.  Sore throat.  Dry cough.  Asthma-like symptoms including chest tightness, shortness of breath, or wheezing.  Globus sensation-something stuck in the throat/fullness  Hoarseness  DIAGNOSIS   Your caregiver may be able to diagnose GERD based on your symptoms. In some cases, X-rays and other tests may be done to check for complications or to check the condition of your stomach and esophagus.  You may need to see another doctor.  TREATMENT    Over-the-counter or prescription medicines to help decrease acid production.   Dietary and behavioral modifications or changes may be also recommended.  HOME CARE INSTRUCTIONS   Change the factors that you can control. Ask your caregiver for guidance concerning weight loss, quitting smoking, and alcohol consumption.  Avoid foods and drinks that make your symptoms worse, and MAY include such as:  Caffeine or alcoholic drinks.  Chocolate.  Gluten containing foods  Dairy  Peppermint or mint flavorings.  Garlic and onions.  Spicy foods.  Citrus fruits, such as oranges, lizbeth, or limes.  Tomato-based foods such as sauce, chili, salsa, and pizza.  Fried and fatty foods.  Avoid lying down for the 3 hours prior to your bedtime or prior to taking a nap.  Eat small, frequent meals instead of large meals.  Wear loose-fitting clothing. Do not wear anything tight around your waist that causes pressure on your stomach.  Raise the head of your bed 6 to 8 inches with wood blocks to help you sleep. Extra pillows will not help.  Only take over-the-counter or prescription medicines for pain, discomfort, or fever as directed by your caregiver.  Do not take aspirin, ibuprofen, or other nonsteroidal anti-inflammatory drugs if possible (NSAIDs).  SEEK IMMEDIATE MEDICAL CARE IF:   You have pain in your arms, neck, jaw, teeth, or back.  Your pain increases or changes in intensity or duration.  You develop nausea, vomiting, or sweating (diaphoresis).  You develop shortness of breath, or you faint.  Your vomit is green, yellow, black, or looks like coffee grounds or blood.  Your stool is red, bloody, or black.  These symptoms could be signs of other problems, such as heart disease, gastric bleeding, or esophageal bleeding.  MAKE SURE YOU:   Understand these instructions.  Will watch your condition.  Will get help right away if you are not doing well or get worse.     This information is not intended to replace advice given to you by your  physician. Make sure you discuss any questions you have with your health care provider.     Modified by Vishal Jacobs MD, FACS 9/8/2016.  Document Released: 09/27/2006 Document Revised: 01/08/2016 Document Reviewed: 04/13/2016  [employer] Interactive Patient Education ©2016 [employer]

## 2023-03-21 ENCOUNTER — OFFICE VISIT (OUTPATIENT)
Dept: OTOLARYNGOLOGY | Facility: CLINIC | Age: 78
End: 2023-03-21
Payer: MEDICARE

## 2023-03-21 VITALS
DIASTOLIC BLOOD PRESSURE: 73 MMHG | HEART RATE: 68 BPM | SYSTOLIC BLOOD PRESSURE: 121 MMHG | WEIGHT: 152 LBS | RESPIRATION RATE: 16 BRPM | HEIGHT: 68 IN | TEMPERATURE: 97.5 F | BODY MASS INDEX: 23.04 KG/M2

## 2023-03-21 DIAGNOSIS — R06.83 SNORING: ICD-10-CM

## 2023-03-21 DIAGNOSIS — G47.33 OBSTRUCTIVE SLEEP APNEA: Primary | ICD-10-CM

## 2023-03-21 DIAGNOSIS — R40.0 DAYTIME SOMNOLENCE: ICD-10-CM

## 2023-03-21 PROCEDURE — 1160F RVW MEDS BY RX/DR IN RCRD: CPT | Performed by: NURSE PRACTITIONER

## 2023-03-21 PROCEDURE — 1159F MED LIST DOCD IN RCRD: CPT | Performed by: NURSE PRACTITIONER

## 2023-03-21 PROCEDURE — 99024 POSTOP FOLLOW-UP VISIT: CPT | Performed by: NURSE PRACTITIONER

## 2023-03-21 RX ORDER — DICLOFENAC SODIUM 75 MG/1
TABLET, DELAYED RELEASE ORAL
COMMUNITY
Start: 2023-03-07

## 2023-03-21 NOTE — PATIENT INSTRUCTIONS
CONTACT INFORMATION:  The main office phone number is [phone]. For emergencies after hours and on weekends, this number will convert over to our answering service and the on call provider will answer. Please try to keep non emergent phone calls/ questions to office hours 9am-5pm Monday through Friday.      [employer]  As an alternative, you can sign up and use the Epic MyChart system for more direct and quicker access for non emergent questions/ problems.  [employer] allows you to send messages to your doctor, view your test results, renew your prescriptions, schedule appointments, and more. To sign up, go to [employer] and click on the Sign Up Now link in the New User? box. Enter your [employer] Activation Code exactly as it appears below along with the last four digits of your Social Security Number and your Date of Birth () to complete the sign-up process. If you do not sign up before the expiration date, you must request a new code.     [employer] Activation Code: Activation code not generated  Current [employer]lic Status: Active     If you have questions, you can email [email or web address] or call [phone] to talk to our [employer] staff. Remember, [employer] is NOT to be used for urgent needs. For medical emergencies, dial 911.     IF YOU SMOKE OR USE TOBACCO PLEASE READ THE FOLLOWING:  Why is smoking bad for me?  Smoking increases the risk of heart disease, lung disease, vascular disease, stroke, and cancer. If you smoke, STOP!        IF YOU SMOKE OR USE TOBACCO PLEASE READ THE FOLLOWING:  Why is smoking bad for me?  Smoking increases the risk of heart disease, lung disease, vascular disease, stroke, and cancer. If you smoke, STOP!     For more information:  Quit Now [place]  -QUIT-NOW  [email or web address]/en-[email or web address]

## 2023-06-27 PROBLEM — M76.821 POSTERIOR TIBIAL TENDON DYSFUNCTION, RIGHT: Status: ACTIVE | Noted: 2023-06-27

## 2023-11-19 ENCOUNTER — ANESTHESIA EVENT (OUTPATIENT)
Dept: OPERATING ROOM | Age: 78
End: 2023-11-19
Payer: MEDICARE

## 2023-11-19 ENCOUNTER — APPOINTMENT (OUTPATIENT)
Dept: CT IMAGING | Age: 78
End: 2023-11-19
Payer: MEDICARE

## 2023-11-19 ENCOUNTER — HOSPITAL ENCOUNTER (INPATIENT)
Age: 78
LOS: 5 days | Discharge: NURSING, REHAB OR LONG-TERM CARE | End: 2023-11-24
Attending: STUDENT IN AN ORGANIZED HEALTH CARE EDUCATION/TRAINING PROGRAM | Admitting: STUDENT IN AN ORGANIZED HEALTH CARE EDUCATION/TRAINING PROGRAM
Payer: MEDICARE

## 2023-11-19 ENCOUNTER — ANESTHESIA (OUTPATIENT)
Dept: OPERATING ROOM | Age: 78
End: 2023-11-19
Payer: MEDICARE

## 2023-11-19 ENCOUNTER — APPOINTMENT (OUTPATIENT)
Dept: GENERAL RADIOLOGY | Age: 78
End: 2023-11-19
Payer: MEDICARE

## 2023-11-19 DIAGNOSIS — S72.001A CLOSED DISPLACED FRACTURE OF RIGHT FEMORAL NECK (HCC): Primary | ICD-10-CM
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
## 2023-11-19 PROBLEM — E03.9 HYPOTHYROIDISM: Status: ACTIVE | Noted: 2023-11-19

## 2023-11-19 LAB
25(OH)D3 SERPL-MCNC: 59.8 NG/ML
ALBUMIN SERPL-MCNC: 4.4 G/DL (ref 3.5–5.2)
ALP SERPL-CCNC: 123 U/L (ref 35–104)
ALT SERPL-CCNC: 25 U/L (ref 5–33)
ANION GAP SERPL CALCULATED.3IONS-SCNC: 9 MMOL/L (ref 7–19)
AST SERPL-CCNC: 23 U/L (ref 5–32)
BASOPHILS # BLD: 0 K/UL (ref 0–0.2)
BASOPHILS NFR BLD: 0.6 % (ref 0–1)
BILIRUB SERPL-MCNC: 0.3 MG/DL (ref 0.2–1.2)
BUN SERPL-MCNC: 12 MG/DL (ref 8–23)
CALCIUM SERPL-MCNC: 9.6 MG/DL (ref 8.8–10.2)
CHLORIDE SERPL-SCNC: 103 MMOL/L (ref 98–111)
CO2 SERPL-SCNC: 28 MMOL/L (ref 22–29)
CREAT SERPL-MCNC: 0.7 MG/DL (ref 0.5–0.9)
EOSINOPHIL # BLD: 0.1 K/UL (ref 0–0.6)
EOSINOPHIL NFR BLD: 0.9 % (ref 0–5)
ERYTHROCYTE [DISTWIDTH] IN BLOOD BY AUTOMATED COUNT: 13.9 % (ref 11.5–14.5)
GLUCOSE SERPL-MCNC: 124 MG/DL (ref 74–109)
HCT VFR BLD AUTO: 37.3 % (ref 37–47)
HGB BLD-MCNC: 12 G/DL (ref 12–16)
IMM GRANULOCYTES # BLD: 0 K/UL
INR PPP: 1.16 (ref 0.88–1.18)
LYMPHOCYTES # BLD: 1.2 K/UL (ref 1.1–4.5)
LYMPHOCYTES NFR BLD: 18.1 % (ref 20–40)
MCH RBC QN AUTO: 30.4 PG (ref 27–31)
MCHC RBC AUTO-ENTMCNC: 32.2 G/DL (ref 33–37)
MCV RBC AUTO: 94.4 FL (ref 81–99)
MONOCYTES # BLD: 0.4 K/UL (ref 0–0.9)
MONOCYTES NFR BLD: 5.2 % (ref 0–10)
NEUTROPHILS # BLD: 5.1 K/UL (ref 1.5–7.5)
NEUTS SEG NFR BLD: 74.9 % (ref 50–65)
PLATELET # BLD AUTO: 141 K/UL (ref 130–400)
PMV BLD AUTO: 12.1 FL (ref 9.4–12.3)
POTASSIUM SERPL-SCNC: 4.4 MMOL/L (ref 3.5–5)
PROT SERPL-MCNC: 6.7 G/DL (ref 6.6–8.7)
PROTHROMBIN TIME: 14.5 SEC (ref 12–14.6)
RBC # BLD AUTO: 3.95 M/UL (ref 4.2–5.4)
SODIUM SERPL-SCNC: 140 MMOL/L (ref 136–145)
TSH SERPL DL<=0.005 MIU/L-ACNC: 3.13 UIU/ML (ref 0.27–4.2)
WBC # BLD AUTO: 6.7 K/UL (ref 4.8–10.8)

## 2023-11-19 PROCEDURE — 7100000001 HC PACU RECOVERY - ADDTL 15 MIN: Performed by: ORTHOPAEDIC SURGERY

## 2023-11-19 PROCEDURE — 93005 ELECTROCARDIOGRAM TRACING: CPT | Performed by: STUDENT IN AN ORGANIZED HEALTH CARE EDUCATION/TRAINING PROGRAM

## 2023-11-19 PROCEDURE — 3600000015 HC SURGERY LEVEL 5 ADDTL 15MIN: Performed by: ORTHOPAEDIC SURGERY

## 2023-11-19 PROCEDURE — C1776 JOINT DEVICE (IMPLANTABLE): HCPCS | Performed by: ORTHOPAEDIC SURGERY

## 2023-11-19 PROCEDURE — 3700000001 HC ADD 15 MINUTES (ANESTHESIA): Performed by: ORTHOPAEDIC SURGERY

## 2023-11-19 PROCEDURE — 1210000000 HC MED SURG R&B

## 2023-11-19 PROCEDURE — 6360000002 HC RX W HCPCS: Performed by: STUDENT IN AN ORGANIZED HEALTH CARE EDUCATION/TRAINING PROGRAM

## 2023-11-19 PROCEDURE — 6360000002 HC RX W HCPCS: Performed by: NURSE ANESTHETIST, CERTIFIED REGISTERED

## 2023-11-19 PROCEDURE — 71045 X-RAY EXAM CHEST 1 VIEW: CPT

## 2023-11-19 PROCEDURE — 0SR90JA REPLACEMENT OF RIGHT HIP JOINT WITH SYNTHETIC SUBSTITUTE, UNCEMENTED, OPEN APPROACH: ICD-10-PCS | Performed by: ORTHOPAEDIC SURGERY

## 2023-11-19 PROCEDURE — 6370000000 HC RX 637 (ALT 250 FOR IP): Performed by: ORTHOPAEDIC SURGERY

## 2023-11-19 PROCEDURE — 3700000000 HC ANESTHESIA ATTENDED CARE: Performed by: ORTHOPAEDIC SURGERY

## 2023-11-19 PROCEDURE — 84443 ASSAY THYROID STIM HORMONE: CPT

## 2023-11-19 PROCEDURE — 3600000005 HC SURGERY LEVEL 5 BASE: Performed by: ORTHOPAEDIC SURGERY

## 2023-11-19 PROCEDURE — 2580000003 HC RX 258: Performed by: NURSE ANESTHETIST, CERTIFIED REGISTERED

## 2023-11-19 PROCEDURE — 73552 X-RAY EXAM OF FEMUR 2/>: CPT

## 2023-11-19 PROCEDURE — 2500000003 HC RX 250 WO HCPCS: Performed by: NURSE ANESTHETIST, CERTIFIED REGISTERED

## 2023-11-19 PROCEDURE — 73502 X-RAY EXAM HIP UNI 2-3 VIEWS: CPT

## 2023-11-19 PROCEDURE — 2580000003 HC RX 258: Performed by: ORTHOPAEDIC SURGERY

## 2023-11-19 PROCEDURE — 72170 X-RAY EXAM OF PELVIS: CPT

## 2023-11-19 PROCEDURE — 94760 N-INVAS EAR/PLS OXIMETRY 1: CPT

## 2023-11-19 PROCEDURE — 80053 COMPREHEN METABOLIC PANEL: CPT

## 2023-11-19 PROCEDURE — C9290 INJ, BUPIVACAINE LIPOSOME: HCPCS | Performed by: ORTHOPAEDIC SURGERY

## 2023-11-19 PROCEDURE — 36415 COLL VENOUS BLD VENIPUNCTURE: CPT

## 2023-11-19 PROCEDURE — 73700 CT LOWER EXTREMITY W/O DYE: CPT

## 2023-11-19 PROCEDURE — 2709999900 HC NON-CHARGEABLE SUPPLY: Performed by: ORTHOPAEDIC SURGERY

## 2023-11-19 PROCEDURE — 94150 VITAL CAPACITY TEST: CPT

## 2023-11-19 PROCEDURE — 6360000002 HC RX W HCPCS: Performed by: ORTHOPAEDIC SURGERY

## 2023-11-19 PROCEDURE — 2720000010 HC SURG SUPPLY STERILE: Performed by: ORTHOPAEDIC SURGERY

## 2023-11-19 PROCEDURE — C1713 ANCHOR/SCREW BN/BN,TIS/BN: HCPCS | Performed by: ORTHOPAEDIC SURGERY

## 2023-11-19 PROCEDURE — 96374 THER/PROPH/DIAG INJ IV PUSH: CPT

## 2023-11-19 PROCEDURE — 99285 EMERGENCY DEPT VISIT HI MDM: CPT

## 2023-11-19 PROCEDURE — 2700000000 HC OXYGEN THERAPY PER DAY

## 2023-11-19 PROCEDURE — 85025 COMPLETE CBC W/AUTO DIFF WBC: CPT

## 2023-11-19 PROCEDURE — 85610 PROTHROMBIN TIME: CPT

## 2023-11-19 PROCEDURE — 82306 VITAMIN D 25 HYDROXY: CPT

## 2023-11-19 PROCEDURE — 7100000000 HC PACU RECOVERY - FIRST 15 MIN: Performed by: ORTHOPAEDIC SURGERY

## 2023-11-19 DEVICE — HEAD FEM DIA28MM +8.5MM OFFSET 12/14 TAPR HIP CERAMIC: Type: IMPLANTABLE DEVICE | Site: ACETABULUM | Status: FUNCTIONAL

## 2023-11-19 DEVICE — CUP ACET DIA54MM 12 H HIP TI GRIPTION VIP TAPR DOME REV: Type: IMPLANTABLE DEVICE | Site: ACETABULUM | Status: FUNCTIONAL

## 2023-11-19 DEVICE — IMPLANTABLE DEVICE: Type: IMPLANTABLE DEVICE | Site: ACETABULUM | Status: FUNCTIONAL

## 2023-11-19 DEVICE — LINER ACETABULAR DM PINNACLE: Type: IMPLANTABLE DEVICE | Site: ACETABULUM | Status: FUNCTIONAL

## 2023-11-19 DEVICE — IMPLANTABLE DEVICE: Type: IMPLANTABLE DEVICE | Site: FEMUR | Status: FUNCTIONAL

## 2023-11-19 DEVICE — SCREW BNE L25MM DIA6.5MM CANC HIP S STL GRIPTION FULL THRD: Type: IMPLANTABLE DEVICE | Site: ACETABULUM | Status: FUNCTIONAL

## 2023-11-19 RX ORDER — ZINC GLUCONATE 50 MG
50 TABLET ORAL DAILY
Status: ON HOLD | COMMUNITY
End: 2023-11-24 | Stop reason: HOSPADM

## 2023-11-19 RX ORDER — IBUPROFEN 400 MG/1
400 TABLET ORAL EVERY 12 HOURS
Status: COMPLETED | OUTPATIENT
Start: 2023-11-19 | End: 2023-11-22

## 2023-11-19 RX ORDER — SODIUM CHLORIDE 9 MG/ML
INJECTION, SOLUTION INTRAVENOUS PRN
Status: DISCONTINUED | OUTPATIENT
Start: 2023-11-19 | End: 2023-11-19 | Stop reason: HOSPADM

## 2023-11-19 RX ORDER — MORPHINE SULFATE 4 MG/ML
4 INJECTION, SOLUTION INTRAMUSCULAR; INTRAVENOUS
Status: DISCONTINUED | OUTPATIENT
Start: 2023-11-19 | End: 2023-11-24 | Stop reason: HOSPADM

## 2023-11-19 RX ORDER — FENTANYL CITRATE 50 UG/ML
INJECTION, SOLUTION INTRAMUSCULAR; INTRAVENOUS PRN
Status: DISCONTINUED | OUTPATIENT
Start: 2023-11-19 | End: 2023-11-19 | Stop reason: SDUPTHER

## 2023-11-19 RX ORDER — VITAMIN B COMPLEX
1 CAPSULE ORAL DAILY
Status: ON HOLD | COMMUNITY
End: 2023-11-24 | Stop reason: HOSPADM

## 2023-11-19 RX ORDER — SODIUM CHLORIDE 9 MG/ML
INJECTION, SOLUTION INTRAVENOUS PRN
Status: DISCONTINUED | OUTPATIENT
Start: 2023-11-19 | End: 2023-11-20 | Stop reason: SDUPTHER

## 2023-11-19 RX ORDER — OXYCODONE HYDROCHLORIDE 5 MG/1
5 TABLET ORAL EVERY 4 HOURS PRN
Status: DISCONTINUED | OUTPATIENT
Start: 2023-11-19 | End: 2023-11-24 | Stop reason: HOSPADM

## 2023-11-19 RX ORDER — ONDANSETRON 2 MG/ML
INJECTION INTRAMUSCULAR; INTRAVENOUS PRN
Status: DISCONTINUED | OUTPATIENT
Start: 2023-11-19 | End: 2023-11-19 | Stop reason: SDUPTHER

## 2023-11-19 RX ORDER — CEFAZOLIN SODIUM 1 G/50ML
INJECTION, SOLUTION INTRAVENOUS PRN
Status: DISCONTINUED | OUTPATIENT
Start: 2023-11-19 | End: 2023-11-19 | Stop reason: SDUPTHER

## 2023-11-19 RX ORDER — POTASSIUM CHLORIDE 7.45 MG/ML
10 INJECTION INTRAVENOUS PRN
Status: DISCONTINUED | OUTPATIENT
Start: 2023-11-19 | End: 2023-11-24 | Stop reason: HOSPADM

## 2023-11-19 RX ORDER — SENNA AND DOCUSATE SODIUM 50; 8.6 MG/1; MG/1
1 TABLET, FILM COATED ORAL 2 TIMES DAILY
Status: DISCONTINUED | OUTPATIENT
Start: 2023-11-19 | End: 2023-11-24 | Stop reason: HOSPADM

## 2023-11-19 RX ORDER — DEXAMETHASONE SODIUM PHOSPHATE 10 MG/ML
INJECTION, SOLUTION INTRAMUSCULAR; INTRAVENOUS PRN
Status: DISCONTINUED | OUTPATIENT
Start: 2023-11-19 | End: 2023-11-19 | Stop reason: SDUPTHER

## 2023-11-19 RX ORDER — LEVOTHYROXINE SODIUM 0.07 MG/1
75 TABLET ORAL DAILY
COMMUNITY

## 2023-11-19 RX ORDER — ONDANSETRON 4 MG/1
4 TABLET, ORALLY DISINTEGRATING ORAL EVERY 8 HOURS PRN
Status: DISCONTINUED | OUTPATIENT
Start: 2023-11-19 | End: 2023-11-20 | Stop reason: SDUPTHER

## 2023-11-19 RX ORDER — MULTIVITAMIN WITH IRON
250 TABLET ORAL DAILY
Status: ON HOLD | COMMUNITY
End: 2023-11-24 | Stop reason: HOSPADM

## 2023-11-19 RX ORDER — SODIUM CHLORIDE 0.9 % (FLUSH) 0.9 %
5-40 SYRINGE (ML) INJECTION EVERY 12 HOURS SCHEDULED
Status: DISCONTINUED | OUTPATIENT
Start: 2023-11-19 | End: 2023-11-24 | Stop reason: HOSPADM

## 2023-11-19 RX ORDER — ACETAMINOPHEN 325 MG/1
650 TABLET ORAL EVERY 6 HOURS
Status: DISCONTINUED | OUTPATIENT
Start: 2023-11-19 | End: 2023-11-24 | Stop reason: HOSPADM

## 2023-11-19 RX ORDER — ONDANSETRON 2 MG/ML
4 INJECTION INTRAMUSCULAR; INTRAVENOUS EVERY 6 HOURS PRN
Status: DISCONTINUED | OUTPATIENT
Start: 2023-11-19 | End: 2023-11-24 | Stop reason: HOSPADM

## 2023-11-19 RX ORDER — DICLOFENAC SODIUM 75 MG/1
75 TABLET, DELAYED RELEASE ORAL DAILY
Status: ON HOLD | COMMUNITY
End: 2023-11-24 | Stop reason: HOSPADM

## 2023-11-19 RX ORDER — ACETAMINOPHEN 325 MG/1
650 TABLET ORAL EVERY 6 HOURS PRN
Status: DISCONTINUED | OUTPATIENT
Start: 2023-11-19 | End: 2023-11-24 | Stop reason: HOSPADM

## 2023-11-19 RX ORDER — VANCOMYCIN HYDROCHLORIDE 1 G/20ML
INJECTION, POWDER, LYOPHILIZED, FOR SOLUTION INTRAVENOUS PRN
Status: DISCONTINUED | OUTPATIENT
Start: 2023-11-19 | End: 2023-11-19 | Stop reason: HOSPADM

## 2023-11-19 RX ORDER — TRANEXAMIC ACID 100 MG/ML
INJECTION, SOLUTION INTRAVENOUS PRN
Status: DISCONTINUED | OUTPATIENT
Start: 2023-11-19 | End: 2023-11-19 | Stop reason: SDUPTHER

## 2023-11-19 RX ORDER — MORPHINE SULFATE 2 MG/ML
2 INJECTION, SOLUTION INTRAMUSCULAR; INTRAVENOUS
Status: DISCONTINUED | OUTPATIENT
Start: 2023-11-19 | End: 2023-11-24 | Stop reason: HOSPADM

## 2023-11-19 RX ORDER — FENTANYL CITRATE 50 UG/ML
50 INJECTION, SOLUTION INTRAMUSCULAR; INTRAVENOUS ONCE
Status: COMPLETED | OUTPATIENT
Start: 2023-11-19 | End: 2023-11-19

## 2023-11-19 RX ORDER — ROPINIROLE 0.25 MG/1
0.25 TABLET, FILM COATED ORAL NIGHTLY
COMMUNITY

## 2023-11-19 RX ORDER — ACETAMINOPHEN 160 MG
2000 TABLET,DISINTEGRATING ORAL DAILY
COMMUNITY

## 2023-11-19 RX ORDER — DONEPEZIL HYDROCHLORIDE 10 MG/1
10 TABLET, ORALLY DISINTEGRATING ORAL NIGHTLY
COMMUNITY

## 2023-11-19 RX ORDER — ROCURONIUM BROMIDE 10 MG/ML
INJECTION, SOLUTION INTRAVENOUS PRN
Status: DISCONTINUED | OUTPATIENT
Start: 2023-11-19 | End: 2023-11-19 | Stop reason: SDUPTHER

## 2023-11-19 RX ORDER — ACETAMINOPHEN 650 MG/1
650 SUPPOSITORY RECTAL EVERY 6 HOURS PRN
Status: DISCONTINUED | OUTPATIENT
Start: 2023-11-19 | End: 2023-11-24 | Stop reason: HOSPADM

## 2023-11-19 RX ORDER — LIDOCAINE HYDROCHLORIDE 10 MG/ML
INJECTION, SOLUTION EPIDURAL; INFILTRATION; INTRACAUDAL; PERINEURAL PRN
Status: DISCONTINUED | OUTPATIENT
Start: 2023-11-19 | End: 2023-11-19 | Stop reason: SDUPTHER

## 2023-11-19 RX ORDER — POTASSIUM CHLORIDE 750 MG/1
10 CAPSULE, EXTENDED RELEASE ORAL DAILY
Status: ON HOLD | COMMUNITY
End: 2023-11-24 | Stop reason: HOSPADM

## 2023-11-19 RX ORDER — HYDROMORPHONE HYDROCHLORIDE 1 MG/ML
0.25 INJECTION, SOLUTION INTRAMUSCULAR; INTRAVENOUS; SUBCUTANEOUS EVERY 5 MIN PRN
Status: DISCONTINUED | OUTPATIENT
Start: 2023-11-19 | End: 2023-11-19 | Stop reason: HOSPADM

## 2023-11-19 RX ORDER — MORPHINE SULFATE 4 MG/ML
4 INJECTION, SOLUTION INTRAMUSCULAR; INTRAVENOUS EVERY 4 HOURS PRN
Status: DISCONTINUED | OUTPATIENT
Start: 2023-11-19 | End: 2023-11-20 | Stop reason: SDUPTHER

## 2023-11-19 RX ORDER — OXYCODONE HYDROCHLORIDE 10 MG/1
10 TABLET ORAL EVERY 4 HOURS PRN
Status: DISCONTINUED | OUTPATIENT
Start: 2023-11-19 | End: 2023-11-20 | Stop reason: SDUPTHER

## 2023-11-19 RX ORDER — ONDANSETRON 2 MG/ML
4 INJECTION INTRAMUSCULAR; INTRAVENOUS EVERY 6 HOURS PRN
Status: DISCONTINUED | OUTPATIENT
Start: 2023-11-19 | End: 2023-11-20 | Stop reason: SDUPTHER

## 2023-11-19 RX ORDER — PROPOFOL 10 MG/ML
INJECTION, EMULSION INTRAVENOUS PRN
Status: DISCONTINUED | OUTPATIENT
Start: 2023-11-19 | End: 2023-11-19 | Stop reason: SDUPTHER

## 2023-11-19 RX ORDER — LEVOTHYROXINE SODIUM 0.07 MG/1
75 TABLET ORAL DAILY
Status: DISCONTINUED | OUTPATIENT
Start: 2023-11-20 | End: 2023-11-24 | Stop reason: HOSPADM

## 2023-11-19 RX ORDER — OXYCODONE HYDROCHLORIDE 5 MG/1
5 TABLET ORAL EVERY 4 HOURS PRN
Status: DISCONTINUED | OUTPATIENT
Start: 2023-11-19 | End: 2023-11-20 | Stop reason: SDUPTHER

## 2023-11-19 RX ORDER — MORPHINE SULFATE 4 MG/ML
4 INJECTION, SOLUTION INTRAMUSCULAR; INTRAVENOUS ONCE
Status: COMPLETED | OUTPATIENT
Start: 2023-11-19 | End: 2023-11-19

## 2023-11-19 RX ORDER — SODIUM CHLORIDE, SODIUM LACTATE, POTASSIUM CHLORIDE, CALCIUM CHLORIDE 600; 310; 30; 20 MG/100ML; MG/100ML; MG/100ML; MG/100ML
INJECTION, SOLUTION INTRAVENOUS CONTINUOUS PRN
Status: DISCONTINUED | OUTPATIENT
Start: 2023-11-19 | End: 2023-11-19 | Stop reason: SDUPTHER

## 2023-11-19 RX ORDER — DEXAMETHASONE SODIUM PHOSPHATE 10 MG/ML
INJECTION, SOLUTION INTRAMUSCULAR; INTRAVENOUS PRN
Status: DISCONTINUED | OUTPATIENT
Start: 2023-11-19 | End: 2023-11-19

## 2023-11-19 RX ORDER — DIPHENHYDRAMINE HYDROCHLORIDE 50 MG/ML
12.5 INJECTION INTRAMUSCULAR; INTRAVENOUS
Status: DISCONTINUED | OUTPATIENT
Start: 2023-11-19 | End: 2023-11-19 | Stop reason: HOSPADM

## 2023-11-19 RX ORDER — DOCUSATE SODIUM 100 MG/1
100 CAPSULE, LIQUID FILLED ORAL DAILY
COMMUNITY

## 2023-11-19 RX ORDER — HYDROMORPHONE HYDROCHLORIDE 1 MG/ML
0.5 INJECTION, SOLUTION INTRAMUSCULAR; INTRAVENOUS; SUBCUTANEOUS EVERY 5 MIN PRN
Status: DISCONTINUED | OUTPATIENT
Start: 2023-11-19 | End: 2023-11-19 | Stop reason: HOSPADM

## 2023-11-19 RX ORDER — MAGNESIUM SULFATE IN WATER 40 MG/ML
2000 INJECTION, SOLUTION INTRAVENOUS PRN
Status: DISCONTINUED | OUTPATIENT
Start: 2023-11-19 | End: 2023-11-24 | Stop reason: HOSPADM

## 2023-11-19 RX ORDER — SODIUM CHLORIDE 0.9 % (FLUSH) 0.9 %
5-40 SYRINGE (ML) INJECTION PRN
Status: DISCONTINUED | OUTPATIENT
Start: 2023-11-19 | End: 2023-11-24 | Stop reason: HOSPADM

## 2023-11-19 RX ORDER — SODIUM CHLORIDE 0.9 % (FLUSH) 0.9 %
5-40 SYRINGE (ML) INJECTION EVERY 12 HOURS SCHEDULED
Status: DISCONTINUED | OUTPATIENT
Start: 2023-11-19 | End: 2023-11-19 | Stop reason: HOSPADM

## 2023-11-19 RX ORDER — POLYETHYLENE GLYCOL 3350 17 G/17G
17 POWDER, FOR SOLUTION ORAL DAILY PRN
Status: DISCONTINUED | OUTPATIENT
Start: 2023-11-19 | End: 2023-11-22

## 2023-11-19 RX ORDER — SODIUM CHLORIDE 9 MG/ML
INJECTION, SOLUTION INTRAVENOUS CONTINUOUS
Status: DISCONTINUED | OUTPATIENT
Start: 2023-11-19 | End: 2023-11-24 | Stop reason: HOSPADM

## 2023-11-19 RX ORDER — SODIUM CHLORIDE 0.9 % (FLUSH) 0.9 %
5-40 SYRINGE (ML) INJECTION EVERY 12 HOURS SCHEDULED
Status: DISCONTINUED | OUTPATIENT
Start: 2023-11-19 | End: 2023-11-20 | Stop reason: SDUPTHER

## 2023-11-19 RX ORDER — OXYCODONE HYDROCHLORIDE 10 MG/1
10 TABLET ORAL EVERY 4 HOURS PRN
Status: DISCONTINUED | OUTPATIENT
Start: 2023-11-19 | End: 2023-11-24 | Stop reason: HOSPADM

## 2023-11-19 RX ORDER — MORPHINE SULFATE 2 MG/ML
2 INJECTION, SOLUTION INTRAMUSCULAR; INTRAVENOUS EVERY 4 HOURS PRN
Status: DISCONTINUED | OUTPATIENT
Start: 2023-11-19 | End: 2023-11-20 | Stop reason: SDUPTHER

## 2023-11-19 RX ORDER — METOCLOPRAMIDE HYDROCHLORIDE 5 MG/ML
10 INJECTION INTRAMUSCULAR; INTRAVENOUS
Status: DISCONTINUED | OUTPATIENT
Start: 2023-11-19 | End: 2023-11-19 | Stop reason: HOSPADM

## 2023-11-19 RX ORDER — SODIUM CHLORIDE 9 MG/ML
INJECTION, SOLUTION INTRAVENOUS PRN
Status: DISCONTINUED | OUTPATIENT
Start: 2023-11-19 | End: 2023-11-24 | Stop reason: HOSPADM

## 2023-11-19 RX ORDER — BUPIVACAINE HYDROCHLORIDE 2.5 MG/ML
INJECTION, SOLUTION INFILTRATION; PERINEURAL PRN
Status: DISCONTINUED | OUTPATIENT
Start: 2023-11-19 | End: 2023-11-19 | Stop reason: HOSPADM

## 2023-11-19 RX ORDER — ONDANSETRON 2 MG/ML
INJECTION INTRAMUSCULAR; INTRAVENOUS PRN
Status: DISCONTINUED | OUTPATIENT
Start: 2023-11-19 | End: 2023-11-19

## 2023-11-19 RX ORDER — POLYETHYLENE GLYCOL 3350 17 G/17G
17 POWDER, FOR SOLUTION ORAL DAILY PRN
Status: DISCONTINUED | OUTPATIENT
Start: 2023-11-19 | End: 2023-11-20 | Stop reason: SDUPTHER

## 2023-11-19 RX ORDER — NALOXONE HYDROCHLORIDE 0.4 MG/ML
0.4 INJECTION, SOLUTION INTRAMUSCULAR; INTRAVENOUS; SUBCUTANEOUS PRN
Status: DISCONTINUED | OUTPATIENT
Start: 2023-11-19 | End: 2023-11-24 | Stop reason: HOSPADM

## 2023-11-19 RX ORDER — POTASSIUM CHLORIDE 20 MEQ/1
40 TABLET, EXTENDED RELEASE ORAL PRN
Status: DISCONTINUED | OUTPATIENT
Start: 2023-11-19 | End: 2023-11-24 | Stop reason: HOSPADM

## 2023-11-19 RX ORDER — SODIUM CHLORIDE 0.9 % (FLUSH) 0.9 %
5-40 SYRINGE (ML) INJECTION PRN
Status: DISCONTINUED | OUTPATIENT
Start: 2023-11-19 | End: 2023-11-19 | Stop reason: HOSPADM

## 2023-11-19 RX ORDER — ONDANSETRON 4 MG/1
4 TABLET, ORALLY DISINTEGRATING ORAL EVERY 8 HOURS PRN
Status: DISCONTINUED | OUTPATIENT
Start: 2023-11-19 | End: 2023-11-24 | Stop reason: HOSPADM

## 2023-11-19 RX ORDER — SODIUM CHLORIDE 0.9 % (FLUSH) 0.9 %
5-40 SYRINGE (ML) INJECTION PRN
Status: DISCONTINUED | OUTPATIENT
Start: 2023-11-19 | End: 2023-11-20 | Stop reason: SDUPTHER

## 2023-11-19 RX ADMIN — FENTANYL CITRATE 50 MCG: 0.05 INJECTION, SOLUTION INTRAMUSCULAR; INTRAVENOUS at 16:48

## 2023-11-19 RX ADMIN — FENTANYL CITRATE 50 MCG: 0.05 INJECTION, SOLUTION INTRAMUSCULAR; INTRAVENOUS at 17:05

## 2023-11-19 RX ADMIN — SODIUM CHLORIDE: 9 INJECTION, SOLUTION INTRAVENOUS at 20:09

## 2023-11-19 RX ADMIN — LIDOCAINE HYDROCHLORIDE 50 MG: 10 INJECTION, SOLUTION EPIDURAL; INFILTRATION; INTRACAUDAL; PERINEURAL at 16:00

## 2023-11-19 RX ADMIN — FENTANYL CITRATE 50 MCG: 0.05 INJECTION, SOLUTION INTRAMUSCULAR; INTRAVENOUS at 18:10

## 2023-11-19 RX ADMIN — TRANEXAMIC ACID 1000 MG: 1 INJECTION, SOLUTION INTRAVENOUS at 16:58

## 2023-11-19 RX ADMIN — CEFAZOLIN SODIUM 1 G: 1 INJECTION, SOLUTION INTRAVENOUS at 16:05

## 2023-11-19 RX ADMIN — IBUPROFEN 400 MG: 400 TABLET, FILM COATED ORAL at 20:08

## 2023-11-19 RX ADMIN — SUGAMMADEX 200 MG: 100 INJECTION, SOLUTION INTRAVENOUS at 18:10

## 2023-11-19 RX ADMIN — ROCURONIUM BROMIDE 50 MG: 10 INJECTION, SOLUTION INTRAVENOUS at 16:00

## 2023-11-19 RX ADMIN — ACETAMINOPHEN 650 MG: 325 TABLET ORAL at 20:08

## 2023-11-19 RX ADMIN — MORPHINE SULFATE 4 MG: 4 INJECTION, SOLUTION INTRAMUSCULAR; INTRAVENOUS at 14:28

## 2023-11-19 RX ADMIN — FENTANYL CITRATE 50 MCG: 0.05 INJECTION, SOLUTION INTRAMUSCULAR; INTRAVENOUS at 16:58

## 2023-11-19 RX ADMIN — ROCURONIUM BROMIDE 10 MG: 10 INJECTION, SOLUTION INTRAVENOUS at 17:05

## 2023-11-19 RX ADMIN — DEXAMETHASONE SODIUM PHOSPHATE 10 MG: 10 INJECTION, SOLUTION INTRAMUSCULAR; INTRAVENOUS at 16:10

## 2023-11-19 RX ADMIN — FENTANYL CITRATE 50 MCG: 0.05 INJECTION, SOLUTION INTRAMUSCULAR; INTRAVENOUS at 16:00

## 2023-11-19 RX ADMIN — PROPOFOL 100 MG: 10 INJECTION, EMULSION INTRAVENOUS at 16:00

## 2023-11-19 RX ADMIN — SODIUM CHLORIDE, SODIUM LACTATE, POTASSIUM CHLORIDE, AND CALCIUM CHLORIDE: 600; 310; 30; 20 INJECTION, SOLUTION INTRAVENOUS at 17:42

## 2023-11-19 RX ADMIN — OXYCODONE HYDROCHLORIDE 5 MG: 5 TABLET ORAL at 20:08

## 2023-11-19 RX ADMIN — FENTANYL CITRATE 50 MCG: 50 INJECTION INTRAMUSCULAR; INTRAVENOUS at 12:27

## 2023-11-19 RX ADMIN — SODIUM CHLORIDE, SODIUM LACTATE, POTASSIUM CHLORIDE, AND CALCIUM CHLORIDE: 600; 310; 30; 20 INJECTION, SOLUTION INTRAVENOUS at 15:54

## 2023-11-19 RX ADMIN — SENNOSIDES, DOCUSATE SODIUM 1 TABLET: 8.6; 5 TABLET ORAL at 20:08

## 2023-11-19 RX ADMIN — SODIUM CHLORIDE, SODIUM LACTATE, POTASSIUM CHLORIDE, AND CALCIUM CHLORIDE: 600; 310; 30; 20 INJECTION, SOLUTION INTRAVENOUS at 17:10

## 2023-11-19 RX ADMIN — ONDANSETRON 4 MG: 2 INJECTION INTRAMUSCULAR; INTRAVENOUS at 16:10

## 2023-11-19 ASSESSMENT — PAIN DESCRIPTION - FREQUENCY: FREQUENCY: INTERMITTENT

## 2023-11-19 ASSESSMENT — ENCOUNTER SYMPTOMS
EYE PAIN: 0
DIARRHEA: 0
SHORTNESS OF BREATH: 0
VOMITING: 0
SORE THROAT: 0
CHEST TIGHTNESS: 0
COUGH: 0
GASTROINTESTINAL NEGATIVE: 1
RESPIRATORY NEGATIVE: 1
NAUSEA: 0
EYE REDNESS: 0
ABDOMINAL PAIN: 0

## 2023-11-19 ASSESSMENT — PAIN DESCRIPTION - ONSET: ONSET: GRADUAL

## 2023-11-19 ASSESSMENT — PAIN SCALES - GENERAL
PAINLEVEL_OUTOF10: 6
PAINLEVEL_OUTOF10: 1
PAINLEVEL_OUTOF10: 2

## 2023-11-19 ASSESSMENT — PAIN DESCRIPTION - LOCATION
LOCATION: HIP
LOCATION: HIP

## 2023-11-19 ASSESSMENT — PAIN DESCRIPTION - DIRECTION: RADIATING_TOWARDS: NO

## 2023-11-19 ASSESSMENT — PAIN DESCRIPTION - DESCRIPTORS
DESCRIPTORS: ACHING
DESCRIPTORS: SHARP

## 2023-11-19 ASSESSMENT — PAIN DESCRIPTION - ORIENTATION: ORIENTATION: RIGHT

## 2023-11-19 ASSESSMENT — PAIN DESCRIPTION - PAIN TYPE: TYPE: SURGICAL PAIN

## 2023-11-19 ASSESSMENT — PAIN - FUNCTIONAL ASSESSMENT: PAIN_FUNCTIONAL_ASSESSMENT: PREVENTS OR INTERFERES SOME ACTIVE ACTIVITIES AND ADLS

## 2023-11-19 NOTE — OP NOTE
OPERATIVE NOTE    PREOPERATIVE DIAGNOSIS: Displaced right subcapital femoral neck fracture     POSTOPERATIVE DIAGNOSIS:  Displaced right subcapital femoral neck fracture     PROCEDURE:  Right Total Hip Arthroplasty     SURGEON:  Barb Pack MD    ASSISTANT:  Scrub Person First: Elder Lazo  Scrub Person Second: Darrin Reyes    The assistant aided in limb position as well as retractor placement. The assistant was also critical in implantation of the prosthesis. In addition to this, the assistant was responsible for wound closure. It was necessary to have the assistant present in order to complete the procedure. ANESTHESIA:  General    ESTIMATED BLOOD LOSS:  300cc. COMPLICATIONS:  None. CONDITION:  Stable. IMPLANTS:    Implant Name Type Inv.  Item Serial No.  Lot No. LRB No. Used Action   CUP ACET [id number] 12 H HIP TI GRIPTION VIP TAPR DOME REV - [id number]  CUP ACET [id number] 12 H HIP TI GRIPTION VIP TAPR DOME REV  [facility] [id number] Right 1 Implanted   SCREW BNE L25MM DIA6.5MM [facility] HIP Pauline Lies [facility] - [id number]  SCREW BNE L25MM DIA6.5MM CANC HIP S STL GRIPTION FULL THRD  [facility] [id number] Right 1 Implanted   SCREW BNE L25MM DIA6.5MM CANC HIP [employer] FULL THRD - [id number]  SCREW BNE L25MM DIA6.5MM CANC HIP S STL GRIPTION FULL THRD  [facility] [id number] Right 1 Implanted   LINER ACETABULAR DM PINNACLE - [id number]  LINER ACETABULAR DM PINNACLE  [facility] [id number] Right 1 Implanted   STEM FEM CMNTLS 7 CLLRD STD OFFSET HA COAT STRL EMPHASYS LTX - [id number]  STEM FEM CMNTLS 7 CLLRD STD OFFSET HA COAT STRL EMPHASYS LTX  [facility] lensgen [facility] [id number] Right 1 Implanted   LINER ACET 28X47 MM ALTRX STRL BI-MENTUM LTX - [id number]  LINER ACET 28X47 MM Lorence Ivory LTX  [facility] [id number] Right 1 Implanted   HEAD FEM [id number] +8.5MM OFFSET 12/14 TAPR

## 2023-11-19 NOTE — DISCHARGE INSTRUCTIONS
Lower Extremity Post-op Instructions  Dr. Alka Rosales      POST-OP CARE: Please follow these instructions closely! Weight Bearing: Your surgery requires that you have the following weight bearing restrictions:   __X__ Weight-bearing as tolerated with anterior hip precautions of the right lower extremity    IMPORTANT PHONE NUMBERS:  For emergencies, please call 911  You may reach Dr. Alka Rosales or his medical assistant Chen Larsen at [phone] M-F 8:00am-5:00pm  After 5pm or on the weekends, please call [phone] to reach the doctor on call. Call immediately if you have any of the following symptoms:  Elevated temperature above 101 degrees for more than 48hours after surgery  Persistent drainage  from wound  Severe pain around surgical site  Calf pain  Any signs of infection    Dressings: Do NOT remove dressing/splint unless noted below. SOME DRAINAGE IS NORMAL! DO NOT touch, remove, or apply ointment to the incision and/or steri strips  Signs of infection that warrant a phone call to our clinical line:  Excessive drainage or redness  Red streaking coming away from the incision  Increased pain  Increased temperature above 101 degrees    Sutures: If your physician uses sutures in your knee, they will dissolve on their own and will not need to be removed. Some black sutures occasionally used will need to be removed 10-14 days after surgery. Bathing:    _X_ Keep your dressing in place until follow-up, but you may begin showering on Wednesday, the third day following surgery. Before each showering session, please ensure that your dressing demonstrates a good seal.  Water may cascade over your dressing, but do not submerge your dressing or incision in water. Elevate: Place 2 pillows under your ankle to get the incision area above the level of the heart to help in swelling (a recliner is not elevated!!!)    Ice: Ice your surgery site 5-6 times per day for 20 minutes at a time with dressing in place.  You should wait at least 30 minutes before icing again to avoid ice irritation. It may be difficult at first to ice the surgery area due to the amount of dressing, but continue to be diligent with icing. Your dressings will be taken down at your first post-op appointment. Range of motion: For the knee- It is important to keep the knee as straight as possible following surgery. NO PILLOWS UNDER THE KNEE, ONLY under the ankle  For the foot/ankle- range of motion restrictions will be given to you at your first post-op appointment. Until that time, avoid any unnecessary range o f motion. Physical therapy- Your physical therapy status will be discussed with you at your first post-op appointment. **Achieving range of motion goals and decreasing swelling/inflammation are the primary focus for the first two (2) weeks following surgery. **    Medications: You will be discharged with the appropriate medications following your surgery. Fill these at the pharmacy and take them as directed on the label. Possible medications that will be prescribed are below. You may or may not receive all of these. Occasionally, additional medications may be given with specific instructions. Percocet/Lortab (oxycodone/hydrocodone with tylenol) - Pain Medication. Take one tablet every 4-6 hours. DO NOT EXCEED 4,000mg of Tylenol in 24 hours. **DO NOT MIX WITH ALCOHOL, DRIVE WHILE TAKING, OR TAKE EXTRA TYLENOL*     Zofran - Anti-nausea medication to help prevent nausea and vomiting after surgery.      Eliquis 2.5 mg - all patients with lower extremity surgery should take one 2.5 mg tablet every 12 hours (twice daily) for 42 days after surgery    **If you are running low on pain medications, please notify us if you need a refill 24-48 hours prior to when you run out, so we can make arrangements to refill the prescription for you if we determine it is necessary**

## 2023-11-19 NOTE — PROGRESS NOTES
Patient arrived to 56. Transfer to bed from ER stretcher. Spoke to patient & family briefly. Orders released, armband placed. Surgery transport here very shortly thereafter to take patient to surgery. Family given location of surgery waiting.

## 2023-11-19 NOTE — CONSULTS
Orthopaedic Surgery  Inpatient Consultation    [place] (1945)  11/19/2023    Requesting Physician: Iwona Perla MD  Consulting Physician: Warden Eric MD  11/19/2023 11:57 AM    CHIEF COMPLAINT:  right hip fracture    History Obtained From:  patient    HISTORY OF PRESENT ILLNESS:                The patient is a 70 y.o. female who presents with above chief complaint. The patient sustained a mechanical fall earlier today onto her right side resulting in the inability to bear weight and severe pain. Imaging demonstrated a displaced subcapital right femoral neck fracture. Past Medical History:    History reviewed. No pertinent past medical history. Past Surgical History:    History reviewed. No pertinent surgical history. Current Medications:   No current facility-administered medications for this encounter. Prior to Admission medications    Not on File       Allergies:  Meperidine    Social History:   Social History     Socioeconomic History    Marital status:      Spouse name: Not on file    Number of children: Not on file    Years of education: Not on file    Highest education level: Not on file   Occupational History    Not on file   Tobacco Use    Smoking status: Never    Smokeless tobacco: Never   Vaping Use    Vaping Use: Never used   Substance and Sexual Activity    Alcohol use: Never    Drug use: Never    Sexual activity: Not on file   Other Topics Concern    Not on file   Social History Narrative    Not on file     Social Determinants of Health     Financial Resource Strain: Not on file   Food Insecurity: Not on file   Transportation Needs: Not on file   Physical Activity: Not on file   Stress: Not on file   Social Connections: Not on file   Intimate Partner Violence: Not on file   Housing Stability: Not on file       Family History:   History reviewed. No pertinent family history.     REVIEW OF SYSTEMS:    Review of systems from ED H&P were discussed and noted to be 01:14 PM    PROT 6.7 11/19/2023 01:14 PM    CALCIUM 9.6 11/19/2023 01:14 PM    BILITOT 0.3 11/19/2023 01:14 PM    ALKPHOS 123 11/19/2023 01:14 PM    AST 23 11/19/2023 01:14 PM    ALT 25 11/19/2023 01:14 PM     BMP:    Lab Results   Component Value Date/Time     11/19/2023 01:14 PM    K 4.4 11/19/2023 01:14 PM     11/19/2023 01:14 PM    CO2 28 11/19/2023 01:14 PM    BUN 12 11/19/2023 01:14 PM    CREATININE 0.7 11/19/2023 01:14 PM    CALCIUM 9.6 11/19/2023 01:14 PM    LABGLOM >60 11/19/2023 01:14 PM    GLUCOSE 124 11/19/2023 01:14 PM       Radiology: Displaced subcapital right femoral neck fracture      IMPRESSION/RECOMMENDATIONS:    Assessment: A displaced subcapital right femoral neck fracture    Plan:  After reviewing the fracture location and morphology with the patient and family at bedside, it was ultimately recommended that total hip arthroplasty would be appropriate given her level of activity. The risks and benefits of that surgery were reviewed noting the risk for infection, nerve and artery damage, continued pain, continued decrease range of motion, parasthesia, paralysis, loss of limb, loss of life, fracture, subsidence, disassociation, dislocation, leg length inequality and the need for further surgery. The patient conveys her understanding and wishes to proceed. Approximately 10 minutes was spent face to face with the patient discussing the current condition. All risks and benefits of treatment options were discussed at great length and all expressed understanding and agreement with medial reasoning.     Tr Mccauley MD 11/19/23 2:59 PM

## 2023-11-20 LAB
ANION GAP SERPL CALCULATED.3IONS-SCNC: 9 MMOL/L (ref 7–19)
BUN SERPL-MCNC: 11 MG/DL (ref 8–23)
CALCIUM SERPL-MCNC: 8.5 MG/DL (ref 8.8–10.2)
CHLORIDE SERPL-SCNC: 110 MMOL/L (ref 98–111)
CO2 SERPL-SCNC: 24 MMOL/L (ref 22–29)
CREAT SERPL-MCNC: 0.9 MG/DL (ref 0.5–0.9)
GLUCOSE SERPL-MCNC: 171 MG/DL (ref 74–109)
HCT VFR BLD AUTO: 29 % (ref 37–47)
HGB BLD-MCNC: 9 G/DL (ref 12–16)
POTASSIUM SERPL-SCNC: 5 MMOL/L (ref 3.5–5)
SODIUM SERPL-SCNC: 143 MMOL/L (ref 136–145)

## 2023-11-20 PROCEDURE — 6360000002 HC RX W HCPCS: Performed by: ORTHOPAEDIC SURGERY

## 2023-11-20 PROCEDURE — 2700000000 HC OXYGEN THERAPY PER DAY

## 2023-11-20 PROCEDURE — 85014 HEMATOCRIT: CPT

## 2023-11-20 PROCEDURE — 36415 COLL VENOUS BLD VENIPUNCTURE: CPT

## 2023-11-20 PROCEDURE — 97530 THERAPEUTIC ACTIVITIES: CPT

## 2023-11-20 PROCEDURE — 94760 N-INVAS EAR/PLS OXIMETRY 1: CPT

## 2023-11-20 PROCEDURE — 97535 SELF CARE MNGMENT TRAINING: CPT

## 2023-11-20 PROCEDURE — 85018 HEMOGLOBIN: CPT

## 2023-11-20 PROCEDURE — 97161 PT EVAL LOW COMPLEX 20 MIN: CPT

## 2023-11-20 PROCEDURE — 6370000000 HC RX 637 (ALT 250 FOR IP): Performed by: ORTHOPAEDIC SURGERY

## 2023-11-20 PROCEDURE — 80048 BASIC METABOLIC PNL TOTAL CA: CPT

## 2023-11-20 PROCEDURE — 6370000000 HC RX 637 (ALT 250 FOR IP): Performed by: STUDENT IN AN ORGANIZED HEALTH CARE EDUCATION/TRAINING PROGRAM

## 2023-11-20 PROCEDURE — 97116 GAIT TRAINING THERAPY: CPT

## 2023-11-20 PROCEDURE — 1210000000 HC MED SURG R&B

## 2023-11-20 PROCEDURE — 97165 OT EVAL LOW COMPLEX 30 MIN: CPT

## 2023-11-20 PROCEDURE — 2580000003 HC RX 258: Performed by: ORTHOPAEDIC SURGERY

## 2023-11-20 RX ORDER — DONEPEZIL HYDROCHLORIDE 10 MG/1
10 TABLET, ORALLY DISINTEGRATING ORAL NIGHTLY
Status: DISCONTINUED | OUTPATIENT
Start: 2023-11-20 | End: 2023-11-20

## 2023-11-20 RX ORDER — DONEPEZIL HYDROCHLORIDE 10 MG/1
10 TABLET, FILM COATED ORAL NIGHTLY
Status: DISCONTINUED | OUTPATIENT
Start: 2023-11-20 | End: 2023-11-24 | Stop reason: HOSPADM

## 2023-11-20 RX ORDER — ROPINIROLE 0.5 MG/1
0.25 TABLET, FILM COATED ORAL NIGHTLY
Status: DISCONTINUED | OUTPATIENT
Start: 2023-11-20 | End: 2023-11-24 | Stop reason: HOSPADM

## 2023-11-20 RX ADMIN — WATER 2000 MG: 1 INJECTION INTRAMUSCULAR; INTRAVENOUS; SUBCUTANEOUS at 07:45

## 2023-11-20 RX ADMIN — ACETAMINOPHEN 650 MG: 325 TABLET ORAL at 07:45

## 2023-11-20 RX ADMIN — OXYCODONE HYDROCHLORIDE 5 MG: 5 TABLET ORAL at 20:06

## 2023-11-20 RX ADMIN — LEVOTHYROXINE SODIUM 75 MCG: 75 TABLET ORAL at 05:50

## 2023-11-20 RX ADMIN — SODIUM CHLORIDE, PRESERVATIVE FREE 10 ML: 5 INJECTION INTRAVENOUS at 07:45

## 2023-11-20 RX ADMIN — OXYCODONE HYDROCHLORIDE 5 MG: 5 TABLET ORAL at 15:44

## 2023-11-20 RX ADMIN — ROPINIROLE HYDROCHLORIDE 0.25 MG: 0.5 TABLET, FILM COATED ORAL at 20:05

## 2023-11-20 RX ADMIN — SENNOSIDES, DOCUSATE SODIUM 1 TABLET: 8.6; 5 TABLET ORAL at 20:06

## 2023-11-20 RX ADMIN — SODIUM CHLORIDE, PRESERVATIVE FREE 10 ML: 5 INJECTION INTRAVENOUS at 20:08

## 2023-11-20 RX ADMIN — APIXABAN 2.5 MG: 2.5 TABLET, FILM COATED ORAL at 20:06

## 2023-11-20 RX ADMIN — OXYCODONE HYDROCHLORIDE 5 MG: 5 TABLET ORAL at 05:50

## 2023-11-20 RX ADMIN — IBUPROFEN 400 MG: 400 TABLET, FILM COATED ORAL at 22:21

## 2023-11-20 RX ADMIN — DONEPEZIL HYDROCHLORIDE 10 MG: 10 TABLET, FILM COATED ORAL at 20:06

## 2023-11-20 RX ADMIN — SODIUM CHLORIDE: 9 INJECTION, SOLUTION INTRAVENOUS at 22:23

## 2023-11-20 RX ADMIN — SENNOSIDES, DOCUSATE SODIUM 1 TABLET: 8.6; 5 TABLET ORAL at 07:45

## 2023-11-20 RX ADMIN — WATER 2000 MG: 1 INJECTION INTRAMUSCULAR; INTRAVENOUS; SUBCUTANEOUS at 00:02

## 2023-11-20 RX ADMIN — ACETAMINOPHEN 650 MG: 325 TABLET ORAL at 15:44

## 2023-11-20 RX ADMIN — APIXABAN 2.5 MG: 2.5 TABLET, FILM COATED ORAL at 05:50

## 2023-11-20 RX ADMIN — ACETAMINOPHEN 650 MG: 325 TABLET ORAL at 20:06

## 2023-11-20 RX ADMIN — SODIUM CHLORIDE: 9 INJECTION, SOLUTION INTRAVENOUS at 12:06

## 2023-11-20 RX ADMIN — IBUPROFEN 400 MG: 400 TABLET, FILM COATED ORAL at 07:45

## 2023-11-20 ASSESSMENT — ENCOUNTER SYMPTOMS
DIARRHEA: 0
CONSTIPATION: 0
ABDOMINAL DISTENTION: 0
SHORTNESS OF BREATH: 0
ABDOMINAL PAIN: 0
NAUSEA: 0
VOMITING: 0

## 2023-11-20 ASSESSMENT — PAIN SCALES - GENERAL
PAINLEVEL_OUTOF10: 6
PAINLEVEL_OUTOF10: 4
PAINLEVEL_OUTOF10: 2
PAINLEVEL_OUTOF10: 1

## 2023-11-20 ASSESSMENT — PAIN DESCRIPTION - ONSET: ONSET: ON-GOING

## 2023-11-20 ASSESSMENT — PAIN DESCRIPTION - LOCATION
LOCATION: HIP

## 2023-11-20 ASSESSMENT — PAIN DESCRIPTION - PAIN TYPE: TYPE: SURGICAL PAIN

## 2023-11-20 ASSESSMENT — PAIN DESCRIPTION - ORIENTATION
ORIENTATION: RIGHT

## 2023-11-20 ASSESSMENT — PAIN - FUNCTIONAL ASSESSMENT: PAIN_FUNCTIONAL_ASSESSMENT: PREVENTS OR INTERFERES SOME ACTIVE ACTIVITIES AND ADLS

## 2023-11-20 ASSESSMENT — PAIN DESCRIPTION - DESCRIPTORS
DESCRIPTORS: ACHING

## 2023-11-20 ASSESSMENT — PAIN DESCRIPTION - FREQUENCY: FREQUENCY: INTERMITTENT

## 2023-11-20 NOTE — PROGRESS NOTES
Occupational Therapy  Facility/Department: [facility] [id number] [facility] Initial Assessment    Name: Serg Elias  : 1945  MRN: [id number]  Date of Service: 2023    Discharge Recommendations:  Patient would benefit from continued therapy after discharge          Patient Diagnosis(es): The primary encounter diagnosis was Closed displaced fracture of right femoral neck ([address] A diagnosis of Fall, initial encounter was also pertinent to this visit. Past Medical History:  has no past medical history on file. Past Surgical History:  has no past surgical history on file. Assessment   Performance deficits / Impairments: Decreased functional mobility ; Decreased ADL status; Decreased balance  Assessment: Eval completed, treatment initiated  Prognosis: Good  Decision Making: Low Complexity  REQUIRES OT FOLLOW-UP: Yes  Activity Tolerance  Activity Tolerance: Patient Tolerated treatment well        Plan   Occupational Therapy Plan  Times Per Week: 3-5  Times Per Day: Once a day     Restrictions  Restrictions/Precautions  Restrictions/Precautions: Fall Risk (WBAT R LE)  Lower Extremity Weight Bearing Restrictions  Right Lower Extremity Weight Bearing: Weight Bearing As Tolerated  Position Activity Restriction  Hip Precautions: Anterior hip precautions      Subjective   General  Chart Reviewed: Yes  Patient assessed for rehabilitation services?: Yes  Additional Pertinent Hx: Per patient L wrist fracture 10/04/2023.  Wrist brace present  Family / Caregiver Present: No  Diagnosis: Right toal hip arthroplasty     Social/Functional History  Social/Functional History  Lives With: Spouse  Home Access: Stairs to enter with rails  Entrance Stairs - Number of Steps: 5  Entrance Stairs - Rails: Both  Bathroom Shower/Tub: Walk-in shower, Shower chair with back  Bathroom Toilet: Handicap height  Bathroom Equipment: Grab bars in shower  Home Equipment: Rollator, Walker, rolling, Wheelchair-manual  ADL

## 2023-11-20 NOTE — PROGRESS NOTES
Patient to pacu. Hematoma noted to right hip, area is soft. Dr. Quita Brooks called to bedside to evaluate, no orders received at this time.

## 2023-11-20 NOTE — PROGRESS NOTES
Physical Therapy  Facility/Department: [facility] SURG SERVICES  Physical Therapy Initial Assessment    Name: Dave Henry  : 1945  MRN: [id number]  Date of Service: 2023    Discharge Recommendations:  Continue to assess pending progress, Patient would benefit from continued therapy after discharge (Pt plans to D/C home with assist of , however would be a good candidate for short-term rehab.)          Patient Diagnosis(es): The primary encounter diagnosis was Closed displaced fracture of right femoral neck ([address] A diagnosis of Fall, initial encounter was also pertinent to this visit. Past Medical History:  has no past medical history on file. Past Surgical History:  has no past surgical history on file. Assessment   Body Structures, Functions, Activity Limitations Requiring Skilled Therapeutic Intervention: Decreased functional mobility ; Decreased ROM; Decreased endurance; Increased pain  Assessment: Pt O2 removed for ambulation and checked after activity to be 94%. Pt had minimal C/O pain with ambulation. Pt would benefit from skilled PT in this setting to improve mobility post R THR. Therapy Prognosis: Good  Decision Making: Low Complexity  Requires PT Follow-Up: Yes  Activity Tolerance  Activity Tolerance: Patient tolerated treatment well     Plan   Physical Therapy Plan  General Plan: 5-7 times per week  Therapy Duration: 2 Weeks  Current Treatment Recommendations: ROM, Functional mobility training, Transfer training, Endurance training, Gait training, Stair training, Safety education & training, Therapeutic activities, Positioning  Additional Comments: Wear L wrist splint when using RW.   Safety Devices  Type of Devices: Call light within reach, Gait belt, Left in chair, Nurse notified     Restrictions  Restrictions/Precautions  Restrictions/Precautions: Fall Risk (WBAT R LE)  Lower Extremity Weight Bearing Restrictions  Right Lower Extremity Weight Bearing: Weight Bearing As

## 2023-11-20 NOTE — CARE COORDINATION
Case Management Assessment  Initial Evaluation    Date/Time of Evaluation: 11/20/2023 2:53 PM  Assessment Completed by: Werner Rhodes RN, BSN    If patient is discharged prior to next notation, then this note serves as note for discharge by case management. Patient Name: Gloria Stone                   YOB: 1945  Diagnosis: Fall, initial encounter [W19. XXXA]  Closed displaced fracture of right femoral neck ([address]) [S72.001A]                   Date / Time: 11/19/2023 11:57 AM    Patient Admission Status: Inpatient   Readmission Risk (Low < 19, Mod (19-27), High > 27): Readmission Risk Score: 9.7    Current PCP: No primary care provider on file. PCP verified by CM? Yes    Chart Reviewed: Yes      History Provided by: Patient  Patient Orientation: Alert and Oriented    Patient Cognition: Alert    Hospitalization in the last 30 days (Readmission):  No    If yes, Readmission Assessment in CM Navigator will be completed. Advance Directives:      Code Status: Full Code   Patient's Primary Decision Maker is: Legal Next of Kin      Discharge Planning:    Patient lives with: Spouse/Significant Other Type of Home: House  Primary Care Giver: Self  Patient Support Systems include: Spouse/Significant Other   Current Financial resources: Other (Comment) (Medicare Advantage plan)  Current community resources: None  Current services prior to admission: Durable Medical Equipment            Current DME: Bedside Commode, Cane, Shower Chair, Wheelchair, Walker            Type of Home Care services:  OT, PT, Nursing Services    ADLS  Prior functional level: Independent in ADLs/IADLs  Current functional level: Assistance with the following:, Mobility    PT AM-PAC:   /24  OT AM-PAC:   /24    Family can provide assistance at DC: Yes  Would you like Case Management to discuss the discharge plan with any other family members/significant others, and if so, who?  Yes (spouse)  Plans to Return to Present Housing: Yes  Other

## 2023-11-20 NOTE — BRIEF OP NOTE
Brief Postoperative Note      Patient: [place]  YOB: 1945  MRN: [id number]    Date of Procedure: 11/19/2023    Pre-Op Diagnosis Codes:     * Closed fracture of neck of right femur, initial encounter ([address]) [S72.001A]    Post-Op Diagnosis: Same       Procedure(s):  HIP TOTAL ARTHROPLASTY ANTERIOR APPROACH    Surgeon(s):  Tr Mccauley MD    Assistant:  * No surgical staff found *    Anesthesia: General    Estimated Blood Loss (mL): 669     Complications: None    Specimens:   * No specimens in log *    Implants:  Implant Name Type Inv.  Item Serial No.  Lot No. LRB No. Used Action   CUP ACET [id number] 12 H HIP TI GRIPTION VIP TAPR DOME REV - [id number]  CUP ACET [id number] 12 H HIP TI GRIPTION VIP TAPR DOME REV  [facility] Sensing [facility] [id number] Right 1 Implanted   SCREW BNE L25MM DIA6.5MM [facility] HIP [facility] Juany THRD - [id number]  SCREW BNE L25MM DIA6.5MM CANC HIP S STL GRIPTION FULL THRD  [facility]ensing [facility] [id number] Right 1 Implanted   SCREW BNE L25MM DIA6.5MM CANC HIP Sussy Feather FULL THRD - [id number]  SCREW BNE L25MM DIA6.5MM CANC HIP S STL GRIPTION FULL THRD  [facility] [employer]ensing [facility] [id number] Right 1 Implanted   LINER ACETABULAR DM PINNACLE - [id number]  LINER ACETABULAR DM PINNACLE  [facility] Sensing [facility] [id number] Right 1 Implanted   STEM FEM CMNTLS 7 CLLRD STD OFFSET HA COAT STRL EMPHASYS LTX - [id number]  STEM FEM CMNTLS 7 CLLRD STD OFFSET HA COAT STRL EMPHASYS LTX  [facility]ensing [facility] [id number] Right 1 Implanted   LINER ACET 28X47 MM ALTRX STRL BI-MENTUM LTX - [id number]  LINER ACET 28X47 MM Christen Romp BI-MENTUM LTX  [facility] Sensing [facility] [id number] Right 1 Implanted   HEAD FEM [id number] +8.5MM OFFSET 12/14 TAPR HIP CERAMIC - [id number]  HEAD FEM [id number] +8.5MM OFFSET 12/14 TAPR HIP CERAMIC  [employer] ORTHOPEDICS-WD [id number] Right 1 Implanted         Drains: * No LDAs found *    Findings: Displaced subcapital right femoral neck fracture      Electronically signed by Thiago Lemus MD on 11/19/2023 at 6:12 PM

## 2023-11-20 NOTE — PROGRESS NOTES
Physical Therapy  Name: Marifer Rangel  MRN:  [id number]  Date of service:  11/20/2023 11/20/23 1415   Restrictions/Precautions   Restrictions/Precautions Fall Risk   Lower Extremity Weight Bearing Restrictions   Right Lower Extremity Weight Bearing Weight Bearing As Tolerated   Position Activity Restriction   Hip Precautions Anterior hip precautions   Subjective   Subjective Pt agreeable to therapy. Bed Mobility   Supine to Sit Stand by assistance   Sit to Supine Minimal assistance   Transfers   Sit to Stand Contact guard assistance   Stand to Sit Contact guard assistance   Ambulation   WB Status WBAT   Ambulation   Surface Level tile   Device Rolling Walker   Assistance Contact guard assistance   Gait Deviations Slow [place];Decreased step length;Decreased step height   Distance 40', 10'   Other Activities   Comment assisted pt with hygiene tasks in BR   Short Term Goals   Time Frame for Short Term Goals 2 wks   Short Term Goal 1 Ambulate 100ft with RW, CGA   Short Term Goal 2 Sit<>stand, SBA   Short Term Goal 3 Steps, CGA   Conditions Requiring Skilled Therapeutic Intervention   Body Structures, Functions, Activity Limitations Requiring Skilled Therapeutic Intervention Decreased functional mobility ; Decreased ROM; Decreased endurance; Increased pain   Assessment Pt doing well with overall mobility, able to tolerate increased gait distance then assisted to/from the BR before returning to supine with all needs in reach and fresh ice applied to R hip.    Activity Tolerance   Activity Tolerance Patient tolerated treatment well   PT Plan of Care   Monday X   Safety Devices   Type of Devices Bed alarm in place;Call light within reach;Gait belt;Left in bed         Electronically signed by Nancy Smith PTA on 11/20/2023 at 2:19 PM

## 2023-11-21 LAB
ABO/RH: NORMAL
ANION GAP SERPL CALCULATED.3IONS-SCNC: 5 MMOL/L (ref 7–19)
ANTIBODY SCREEN: NORMAL
BASOPHILS # BLD: 0 K/UL (ref 0–0.2)
BASOPHILS NFR BLD: 0.4 % (ref 0–1)
BUN SERPL-MCNC: 11 MG/DL (ref 8–23)
CALCIUM SERPL-MCNC: 7.8 MG/DL (ref 8.8–10.2)
CHLORIDE SERPL-SCNC: 110 MMOL/L (ref 98–111)
CO2 SERPL-SCNC: 25 MMOL/L (ref 22–29)
CREAT SERPL-MCNC: 0.8 MG/DL (ref 0.5–0.9)
EKG P AXIS: 58 DEGREES
EKG P-R INTERVAL: 218 MS
EKG Q-T INTERVAL: 420 MS
EKG QRS DURATION: 92 MS
EKG QTC CALCULATION (BAZETT): 403 MS
EKG T AXIS: 46 DEGREES
EOSINOPHIL # BLD: 0.1 K/UL (ref 0–0.6)
EOSINOPHIL NFR BLD: 1 % (ref 0–5)
ERYTHROCYTE [DISTWIDTH] IN BLOOD BY AUTOMATED COUNT: 14.6 % (ref 11.5–14.5)
GLUCOSE SERPL-MCNC: 122 MG/DL (ref 74–109)
HCT VFR BLD AUTO: 21.2 % (ref 37–47)
HCT VFR BLD AUTO: 25 % (ref 37–47)
HGB BLD-MCNC: 6.6 G/DL (ref 12–16)
HGB BLD-MCNC: 7.8 G/DL (ref 12–16)
IMM GRANULOCYTES # BLD: 0 K/UL
LYMPHOCYTES # BLD: 1.9 K/UL (ref 1.1–4.5)
LYMPHOCYTES NFR BLD: 26.5 % (ref 20–40)
MCH RBC QN AUTO: 31 PG (ref 27–31)
MCHC RBC AUTO-ENTMCNC: 31.1 G/DL (ref 33–37)
MCV RBC AUTO: 99.5 FL (ref 81–99)
MONOCYTES # BLD: 0.6 K/UL (ref 0–0.9)
MONOCYTES NFR BLD: 7.9 % (ref 0–10)
NEUTROPHILS # BLD: 4.6 K/UL (ref 1.5–7.5)
NEUTS SEG NFR BLD: 63.8 % (ref 50–65)
PLATELET # BLD AUTO: 87 K/UL (ref 130–400)
PMV BLD AUTO: 12.6 FL (ref 9.4–12.3)
POTASSIUM SERPL-SCNC: 4 MMOL/L (ref 3.5–5)
RBC # BLD AUTO: 2.13 M/UL (ref 4.2–5.4)
SODIUM SERPL-SCNC: 140 MMOL/L (ref 136–145)
WBC # BLD AUTO: 7.2 K/UL (ref 4.8–10.8)

## 2023-11-21 PROCEDURE — 80048 BASIC METABOLIC PNL TOTAL CA: CPT

## 2023-11-21 PROCEDURE — 94760 N-INVAS EAR/PLS OXIMETRY 1: CPT

## 2023-11-21 PROCEDURE — 93010 ELECTROCARDIOGRAM REPORT: CPT | Performed by: INTERNAL MEDICINE

## 2023-11-21 PROCEDURE — 86900 BLOOD TYPING SEROLOGIC ABO: CPT

## 2023-11-21 PROCEDURE — 86850 RBC ANTIBODY SCREEN: CPT

## 2023-11-21 PROCEDURE — 86923 COMPATIBILITY TEST ELECTRIC: CPT

## 2023-11-21 PROCEDURE — 2580000003 HC RX 258: Performed by: ORTHOPAEDIC SURGERY

## 2023-11-21 PROCEDURE — 86901 BLOOD TYPING SEROLOGIC RH(D): CPT

## 2023-11-21 PROCEDURE — 85014 HEMATOCRIT: CPT

## 2023-11-21 PROCEDURE — 6370000000 HC RX 637 (ALT 250 FOR IP): Performed by: STUDENT IN AN ORGANIZED HEALTH CARE EDUCATION/TRAINING PROGRAM

## 2023-11-21 PROCEDURE — 85025 COMPLETE CBC W/AUTO DIFF WBC: CPT

## 2023-11-21 PROCEDURE — 6370000000 HC RX 637 (ALT 250 FOR IP): Performed by: ORTHOPAEDIC SURGERY

## 2023-11-21 PROCEDURE — 97116 GAIT TRAINING THERAPY: CPT

## 2023-11-21 PROCEDURE — P9016 RBC LEUKOCYTES REDUCED: HCPCS

## 2023-11-21 PROCEDURE — 2700000000 HC OXYGEN THERAPY PER DAY

## 2023-11-21 PROCEDURE — 36415 COLL VENOUS BLD VENIPUNCTURE: CPT

## 2023-11-21 PROCEDURE — 36430 TRANSFUSION BLD/BLD COMPNT: CPT

## 2023-11-21 PROCEDURE — 85018 HEMOGLOBIN: CPT

## 2023-11-21 PROCEDURE — 30233N1 TRANSFUSION OF NONAUTOLOGOUS RED BLOOD CELLS INTO PERIPHERAL VEIN, PERCUTANEOUS APPROACH: ICD-10-PCS | Performed by: INTERNAL MEDICINE

## 2023-11-21 PROCEDURE — 1210000000 HC MED SURG R&B

## 2023-11-21 RX ORDER — SODIUM CHLORIDE 9 MG/ML
INJECTION, SOLUTION INTRAVENOUS PRN
Status: DISCONTINUED | OUTPATIENT
Start: 2023-11-21 | End: 2023-11-23 | Stop reason: ALTCHOICE

## 2023-11-21 RX ADMIN — MAGNESIUM HYDROXIDE 30 ML: 400 SUSPENSION ORAL at 05:52

## 2023-11-21 RX ADMIN — OXYCODONE HYDROCHLORIDE 5 MG: 5 TABLET ORAL at 13:36

## 2023-11-21 RX ADMIN — POLYETHYLENE GLYCOL 3350 17 G: 17 POWDER, FOR SOLUTION ORAL at 13:48

## 2023-11-21 RX ADMIN — SODIUM CHLORIDE: 9 INJECTION, SOLUTION INTRAVENOUS at 20:18

## 2023-11-21 RX ADMIN — LEVOTHYROXINE SODIUM 75 MCG: 75 TABLET ORAL at 05:46

## 2023-11-21 RX ADMIN — OXYCODONE HYDROCHLORIDE 10 MG: 10 TABLET ORAL at 05:51

## 2023-11-21 RX ADMIN — OXYCODONE HYDROCHLORIDE 5 MG: 5 TABLET ORAL at 18:00

## 2023-11-21 RX ADMIN — SENNOSIDES, DOCUSATE SODIUM 1 TABLET: 8.6; 5 TABLET ORAL at 20:17

## 2023-11-21 RX ADMIN — DONEPEZIL HYDROCHLORIDE 10 MG: 10 TABLET, FILM COATED ORAL at 20:17

## 2023-11-21 RX ADMIN — ROPINIROLE HYDROCHLORIDE 0.25 MG: 0.5 TABLET, FILM COATED ORAL at 20:17

## 2023-11-21 RX ADMIN — IBUPROFEN 400 MG: 400 TABLET, FILM COATED ORAL at 08:45

## 2023-11-21 RX ADMIN — ACETAMINOPHEN 650 MG: 325 TABLET ORAL at 08:58

## 2023-11-21 RX ADMIN — OXYCODONE HYDROCHLORIDE 10 MG: 10 TABLET ORAL at 01:51

## 2023-11-21 RX ADMIN — APIXABAN 2.5 MG: 2.5 TABLET, FILM COATED ORAL at 08:45

## 2023-11-21 RX ADMIN — APIXABAN 2.5 MG: 2.5 TABLET, FILM COATED ORAL at 20:17

## 2023-11-21 RX ADMIN — SODIUM CHLORIDE, PRESERVATIVE FREE 10 ML: 5 INJECTION INTRAVENOUS at 08:44

## 2023-11-21 RX ADMIN — SENNOSIDES, DOCUSATE SODIUM 1 TABLET: 8.6; 5 TABLET ORAL at 08:45

## 2023-11-21 RX ADMIN — IBUPROFEN 400 MG: 400 TABLET, FILM COATED ORAL at 20:17

## 2023-11-21 RX ADMIN — ACETAMINOPHEN 650 MG: 325 TABLET ORAL at 01:51

## 2023-11-21 RX ADMIN — OXYCODONE HYDROCHLORIDE 5 MG: 5 TABLET ORAL at 22:34

## 2023-11-21 ASSESSMENT — PAIN DESCRIPTION - DESCRIPTORS
DESCRIPTORS: ACHING
DESCRIPTORS: ACHING;DISCOMFORT;SORE
DESCRIPTORS: DISCOMFORT
DESCRIPTORS: ACHING

## 2023-11-21 ASSESSMENT — ENCOUNTER SYMPTOMS
ABDOMINAL DISTENTION: 0
ABDOMINAL PAIN: 0
DIARRHEA: 0
NAUSEA: 0
VOMITING: 0
CONSTIPATION: 0
SHORTNESS OF BREATH: 0

## 2023-11-21 ASSESSMENT — PAIN DESCRIPTION - FREQUENCY
FREQUENCY: INTERMITTENT
FREQUENCY: CONTINUOUS
FREQUENCY: CONTINUOUS

## 2023-11-21 ASSESSMENT — PAIN SCALES - GENERAL
PAINLEVEL_OUTOF10: 4
PAINLEVEL_OUTOF10: 3
PAINLEVEL_OUTOF10: 6
PAINLEVEL_OUTOF10: 8
PAINLEVEL_OUTOF10: 10

## 2023-11-21 ASSESSMENT — PAIN DESCRIPTION - ORIENTATION
ORIENTATION: RIGHT

## 2023-11-21 ASSESSMENT — PAIN DESCRIPTION - LOCATION
LOCATION: HIP

## 2023-11-21 ASSESSMENT — PAIN DESCRIPTION - PAIN TYPE
TYPE: SURGICAL PAIN

## 2023-11-21 ASSESSMENT — PAIN SCALES - WONG BAKER: WONGBAKER_NUMERICALRESPONSE: 6

## 2023-11-21 ASSESSMENT — PAIN DESCRIPTION - ONSET: ONSET: ON-GOING

## 2023-11-21 NOTE — PROGRESS NOTES
Physical Therapy  Name: Marifer Rangel  MRN:  [id number]  Date of service:  11/21/2023 11/21/23 9944   Restrictions/Precautions   Restrictions/Precautions Fall Risk   Lower Extremity Weight Bearing Restrictions   Right Lower Extremity Weight Bearing Weight Bearing As Tolerated   Position Activity Restriction   Hip Precautions Anterior hip precautions   Other position/activity restrictions WBAT R LE   Subjective   Subjective Pt agreed to therapy. Pain Assessment   Pain Assessment 0-10   Pain Level 8  (with mobility)   Pain Location Hip   Pain Orientation Right   Pain Descriptors Aching   Functional Pain Assessment Prevents or interferes some active activities and ADLs   Pain Type Surgical pain   Pain Frequency Continuous   Non-Pharmaceutical Pain Intervention(s) Ambulation/Increased Activity;Repositioned; Rest   Response to Pain Intervention Patient satisfied   Bed Mobility   Supine to Sit Stand by assistance   Comment instructed in use of gait belt with RLE to assist out of bed   Transfers   Sit to Stand Contact guard assistance   Stand to Sit Contact guard assistance   Ambulation   WB Status WBAT   Ambulation   Surface Level tile   Device Rolling Walker   Assistance Contact guard assistance   Gait Deviations Slow [place];Decreased step length;Decreased step height   Distance 10' x 2   Other Activities   Comment pt able to amb into bathroom and complete hygiene, stood at sink with CGA for balance   Short Term Goals   Time Frame for Short Term Goals 2 wks   Short Term Goal 1 Ambulate 100ft with RW, CGA   Short Term Goal 2 Sit<>stand, SBA   Short Term Goal 3 Steps, CGA   Conditions Requiring Skilled Therapeutic Intervention   Body Structures, Functions, Activity Limitations Requiring Skilled Therapeutic Intervention Decreased functional mobility ; Decreased ROM; Decreased endurance; Increased pain   Assessment Pt able to amb short distance but requires v/c's for sequence and gait technique.  Able to improve gait

## 2023-11-21 NOTE — PROGRESS NOTES
Physician Progress Note      Crystal Keyes  [facility] #:                  [id number]  :                       1945  ADMIT DATE:       2023 11:57 AM  DISCH DATE:  RESPONDING  PROVIDER #:        MILAD CHAPPELL          QUERY TEXT:    Pt admitted with Displaced right subcapital femoral neck fracture,. Pt noted   to have Generalized osteopenia in CT on  . If possible, please document   in progress notes and discharge summary if you are evaluating and/or treating   any of the following: The medical record reflects the following:  Risk Factors: Displaced right subcapital femoral neck   fracture,osteopenia,Age-78, female  Clinical Indicators: Generalized osteopenia in CT on   Closed displaced fracture of right femoral neck in H&P on   Treatment:Arthroplasty, Cholecalciferol  50 MCG ,CT hip    Lindsey Stuart RN-BSN, [facility]  Clinical   O. [phone]. [email or web address]  [facility] [employer]  Options provided:  -- Pathological Displaced right subcapital femoral neck fracture due to   osteopenia following fall which would not usually break a normal, healthy bone  -- Traumatic Displaced right subcapital femoral neck fracture  -- Other - I will add my own diagnosis  -- Disagree - Not applicable / Not valid  -- Disagree - Clinically unable to determine / Unknown  -- Refer to Clinical Documentation Reviewer    PROVIDER RESPONSE TEXT:    This patient has a pathological Displaced right subcapital femoral neck   fracture due to osteopenia following fall which would not usually break a   normal, healthy bone.     Query created by: Carleen Kerns on 2023 11:52 AM      Electronically signed by:  Yvette Jones 2023 4:26 PM

## 2023-11-21 NOTE — PROGRESS NOTES
Sayra Woodruff      Patient:  Kerry Perry  YOB: 1945  Date of Service: 11/21/2023  MRN: [id number]   Acct: [de-identified]   Primary Care Physician: No primary care provider on file. Advance Directive: Full Code  Admit Date: 11/19/2023       Hospital Day: 2  Portions of this note have been copied forward, however, changed to reflect the most current clinical status of this patient. CHIEF COMPLAINT   Fall    SUBJECTIVE:  Resting in the bed. States she has been up without difficulty. PT/OT evaluation and recommendation, following. Pain controled. Tolerating diet. Requesting home health referral.      [address]:  The patient is a 70 y.o. female with hypothyroidism, VICTORIA s/p hypoglossal nerve stimulation implant complaining of fall. Patient mechanical fall today while climbing on steps landing on her right hip, she was unable to bear weight and had significant pain thus notify EMS. She denies lightheadedness or syncopal event. Denies fever, chills, nausea, vomiting, abdominal pain, shortness of breath or chest pain. Denies neck or back pain. Work up in [address] [place] [address] no acute cardiopulmonary process, XR right femur acute right femoral neck fracture, CT right hip communicated displaced and angulated right femoral neck fracture. Patient is to be admitted to hospitalist service with consultation orthopedic surgery. PT/OT evaluation and recommendation for discharge, Case management to assist with discharge planning. Monitor HH, hgb 9.0, slight decrease 12.0 post op. Hgb 6.6, transfused with 1 unit PRBC, will recheck post transfusion, 7.8, repeat in the AM.  Home health referral at discharge. Pain controled. PT/OT per order. Review of Systems:   Review of Systems   Constitutional:  Positive for activity change. Negative for appetite change. Respiratory:  Negative for shortness of breath. Cardiovascular:  Negative for leg swelling.    Gastrointestinal:  Negative for

## 2023-11-21 NOTE — CARE COORDINATION
Spoke with patient regarding MD orders for [facility] services. Pt agreeable and chose [facility]. Referral faxed and accepted. Please notify [facility] when patient discharges and Fax DC Summary,  DC med list and any new [facility] orders. P. [phone]  F. [phone]  The Patient and/or patient representative was provided with a choice of provider and agrees   with the discharge plan. [x] Yes [] No    Freedom of choice list was provided with basic dialogue that supports the patient's individualized plan of care/goals, treatment preferences and shares the quality data associated with the providers.  [x] Yes [] No  Electronically signed by Gregory Hernández on 11/21/23 at 2:43 PM CST

## 2023-11-21 NOTE — PROGRESS NOTES
Occupational Therapy  Pt in bed and wanting to take a nap this pm. Pt states she just went to the bathroom with nursing staff. Will continue to follow.  Electronically signed by JENELLE Gonzalez on 11/21/2023 at 2:36 PM

## 2023-11-21 NOTE — PROGRESS NOTES
Physical Therapy    Patient Name: Sailaja Rosales  MRN: [id number]  Date: 11/21/2023 11/21/23 1520   Restrictions/Precautions   Restrictions/Precautions Fall Risk   Lower Extremity Weight Bearing Restrictions   Right Lower Extremity Weight Bearing Weight Bearing As Tolerated   Position Activity Restriction   Hip Precautions Anterior hip precautions   Other position/activity restrictions WBAT R LE   Subjective   Subjective Pt agreed to walk   Pain Assessment   Pain Assessment Gonzalez-Baker FACES   Gonzalez-Baker Pain Rating 6  (Pain increased with sit<>stand transfers)   Pain Location Hip   Pain Orientation Right   Pain Descriptors Discomfort   Functional Pain Assessment Prevents or interferes some active activities and ADLs   Pain Type Surgical pain   Pain Frequency Intermittent   Non-Pharmaceutical Pain Intervention(s) Ambulation/Increased Activity;Repositioned; Rest   Response to Pain Intervention Patient satisfied   Oxygen Therapy   Pulse Oximeter Device Mode Intermittent   Pulse Oximeter Device Location Finger   O2 Device Nasal cannula   O2 Flow Rate (L/min) 2 L/min   Bed Mobility   Supine to Sit Contact guard assistance   Sit to Supine Contact guard assistance   Comment instructed in use of gait belt with RLE to assist out of bed   Transfers   Sit to Stand Contact guard assistance   Stand to Sit Contact guard assistance   Comment increased time due to pain   Ambulation   Surface Level tile   Device Rolling Walker   Assistance Contact guard assistance   Gait Deviations Slow [place];Decreased step length;Decreased step height;Shuffles  (Pt often does not clear LLE for heelstrike due to lack of weight acceptance onto RLE.)   Distance 20ft   Short Term Goals   Time Frame for Short Term Goals 2 wks   Short Term Goal 1 Ambulate 100ft with RW, CGA   Short Term Goal 2 Sit<>stand, SBA   Short Term Goal 3 Steps, CGA   Conditions Requiring Skilled Therapeutic Intervention   Body Structures, Functions, Activity Limitations

## 2023-11-22 LAB
ANION GAP SERPL CALCULATED.3IONS-SCNC: 4 MMOL/L (ref 7–19)
BASOPHILS # BLD: 0 K/UL (ref 0–0.2)
BASOPHILS NFR BLD: 0.5 % (ref 0–1)
BLOOD BANK DISPENSE STATUS: NORMAL
BLOOD BANK DISPENSE STATUS: NORMAL
BLOOD BANK PRODUCT CODE: NORMAL
BLOOD BANK PRODUCT CODE: NORMAL
BPU ID: NORMAL
BPU ID: NORMAL
BUN SERPL-MCNC: 7 MG/DL (ref 8–23)
CALCIUM SERPL-MCNC: 7.8 MG/DL (ref 8.8–10.2)
CHLORIDE SERPL-SCNC: 110 MMOL/L (ref 98–111)
CO2 SERPL-SCNC: 25 MMOL/L (ref 22–29)
CREAT SERPL-MCNC: 0.6 MG/DL (ref 0.5–0.9)
DESCRIPTION BLOOD BANK: NORMAL
DESCRIPTION BLOOD BANK: NORMAL
EOSINOPHIL # BLD: 0.2 K/UL (ref 0–0.6)
EOSINOPHIL NFR BLD: 2.9 % (ref 0–5)
ERYTHROCYTE [DISTWIDTH] IN BLOOD BY AUTOMATED COUNT: 14.8 % (ref 11.5–14.5)
GLUCOSE SERPL-MCNC: 106 MG/DL (ref 74–109)
HCT VFR BLD AUTO: 21.3 % (ref 37–47)
HCT VFR BLD AUTO: 25.6 % (ref 37–47)
HCT VFR BLD AUTO: 26.5 % (ref 37–47)
HGB BLD-MCNC: 6.9 G/DL (ref 12–16)
HGB BLD-MCNC: 8.2 G/DL (ref 12–16)
HGB BLD-MCNC: 8.4 G/DL (ref 12–16)
IMM GRANULOCYTES # BLD: 0 K/UL
LYMPHOCYTES # BLD: 2.1 K/UL (ref 1.1–4.5)
LYMPHOCYTES NFR BLD: 32.1 % (ref 20–40)
MCH RBC QN AUTO: 31.9 PG (ref 27–31)
MCHC RBC AUTO-ENTMCNC: 32.4 G/DL (ref 33–37)
MCV RBC AUTO: 98.6 FL (ref 81–99)
MONOCYTES # BLD: 0.5 K/UL (ref 0–0.9)
MONOCYTES NFR BLD: 8.1 % (ref 0–10)
NEUTROPHILS # BLD: 3.7 K/UL (ref 1.5–7.5)
NEUTS SEG NFR BLD: 55.9 % (ref 50–65)
PLATELET # BLD AUTO: 89 K/UL (ref 130–400)
PMV BLD AUTO: 12.9 FL (ref 9.4–12.3)
POTASSIUM SERPL-SCNC: 3.6 MMOL/L (ref 3.5–5)
RBC # BLD AUTO: 2.16 M/UL (ref 4.2–5.4)
SODIUM SERPL-SCNC: 139 MMOL/L (ref 136–145)
WBC # BLD AUTO: 6.6 K/UL (ref 4.8–10.8)

## 2023-11-22 PROCEDURE — 97116 GAIT TRAINING THERAPY: CPT

## 2023-11-22 PROCEDURE — 6370000000 HC RX 637 (ALT 250 FOR IP): Performed by: ORTHOPAEDIC SURGERY

## 2023-11-22 PROCEDURE — 85025 COMPLETE CBC W/AUTO DIFF WBC: CPT

## 2023-11-22 PROCEDURE — 6370000000 HC RX 637 (ALT 250 FOR IP): Performed by: HOSPITALIST

## 2023-11-22 PROCEDURE — 94760 N-INVAS EAR/PLS OXIMETRY 1: CPT

## 2023-11-22 PROCEDURE — 36415 COLL VENOUS BLD VENIPUNCTURE: CPT

## 2023-11-22 PROCEDURE — 80048 BASIC METABOLIC PNL TOTAL CA: CPT

## 2023-11-22 PROCEDURE — 85018 HEMOGLOBIN: CPT

## 2023-11-22 PROCEDURE — 2700000000 HC OXYGEN THERAPY PER DAY

## 2023-11-22 PROCEDURE — 36430 TRANSFUSION BLD/BLD COMPNT: CPT

## 2023-11-22 PROCEDURE — 2580000003 HC RX 258: Performed by: ORTHOPAEDIC SURGERY

## 2023-11-22 PROCEDURE — 6370000000 HC RX 637 (ALT 250 FOR IP)

## 2023-11-22 PROCEDURE — 6370000000 HC RX 637 (ALT 250 FOR IP): Performed by: STUDENT IN AN ORGANIZED HEALTH CARE EDUCATION/TRAINING PROGRAM

## 2023-11-22 PROCEDURE — 85014 HEMATOCRIT: CPT

## 2023-11-22 PROCEDURE — 1210000000 HC MED SURG R&B

## 2023-11-22 RX ORDER — POLYETHYLENE GLYCOL 3350 17 G/17G
17 POWDER, FOR SOLUTION ORAL DAILY
Status: DISCONTINUED | OUTPATIENT
Start: 2023-11-23 | End: 2023-11-22

## 2023-11-22 RX ORDER — POLYETHYLENE GLYCOL 3350 17 G/17G
17 POWDER, FOR SOLUTION ORAL 2 TIMES DAILY
Status: DISCONTINUED | OUTPATIENT
Start: 2023-11-22 | End: 2023-11-24 | Stop reason: HOSPADM

## 2023-11-22 RX ORDER — BISACODYL 5 MG/1
10 TABLET, DELAYED RELEASE ORAL ONCE
Status: COMPLETED | OUTPATIENT
Start: 2023-11-22 | End: 2023-11-22

## 2023-11-22 RX ORDER — LACTULOSE 10 G/15ML
20 SOLUTION ORAL 3 TIMES DAILY
Status: DISPENSED | OUTPATIENT
Start: 2023-11-22 | End: 2023-11-24

## 2023-11-22 RX ORDER — SODIUM CHLORIDE 9 MG/ML
INJECTION, SOLUTION INTRAVENOUS PRN
Status: DISCONTINUED | OUTPATIENT
Start: 2023-11-22 | End: 2023-11-23 | Stop reason: ALTCHOICE

## 2023-11-22 RX ADMIN — LACTULOSE 20 G: 20 SOLUTION ORAL at 14:48

## 2023-11-22 RX ADMIN — APIXABAN 2.5 MG: 2.5 TABLET, FILM COATED ORAL at 08:25

## 2023-11-22 RX ADMIN — ACETAMINOPHEN 650 MG: 325 TABLET ORAL at 20:47

## 2023-11-22 RX ADMIN — LINACLOTIDE 145 MCG: 145 CAPSULE, GELATIN COATED ORAL at 20:46

## 2023-11-22 RX ADMIN — OXYCODONE HYDROCHLORIDE 10 MG: 10 TABLET ORAL at 21:07

## 2023-11-22 RX ADMIN — DONEPEZIL HYDROCHLORIDE 10 MG: 10 TABLET, FILM COATED ORAL at 20:46

## 2023-11-22 RX ADMIN — SODIUM CHLORIDE: 9 INJECTION, SOLUTION INTRAVENOUS at 21:00

## 2023-11-22 RX ADMIN — SENNOSIDES, DOCUSATE SODIUM 1 TABLET: 8.6; 5 TABLET ORAL at 20:47

## 2023-11-22 RX ADMIN — SODIUM CHLORIDE, PRESERVATIVE FREE 10 ML: 5 INJECTION INTRAVENOUS at 20:48

## 2023-11-22 RX ADMIN — OXYCODONE HYDROCHLORIDE 5 MG: 5 TABLET ORAL at 06:29

## 2023-11-22 RX ADMIN — BISACODYL 10 MG: 5 TABLET, COATED ORAL at 14:48

## 2023-11-22 RX ADMIN — LEVOTHYROXINE SODIUM 75 MCG: 75 TABLET ORAL at 06:06

## 2023-11-22 RX ADMIN — ROPINIROLE HYDROCHLORIDE 0.25 MG: 0.5 TABLET, FILM COATED ORAL at 20:47

## 2023-11-22 RX ADMIN — ACETAMINOPHEN 650 MG: 325 TABLET ORAL at 14:48

## 2023-11-22 RX ADMIN — ACETAMINOPHEN 650 MG: 325 TABLET ORAL at 08:25

## 2023-11-22 RX ADMIN — IBUPROFEN 400 MG: 400 TABLET, FILM COATED ORAL at 08:25

## 2023-11-22 RX ADMIN — APIXABAN 2.5 MG: 2.5 TABLET, FILM COATED ORAL at 20:47

## 2023-11-22 RX ADMIN — SENNOSIDES, DOCUSATE SODIUM 1 TABLET: 8.6; 5 TABLET ORAL at 08:25

## 2023-11-22 ASSESSMENT — PAIN DESCRIPTION - DESCRIPTORS
DESCRIPTORS: ACHING
DESCRIPTORS: ACHING
DESCRIPTORS: ACHING;JABBING;THROBBING

## 2023-11-22 ASSESSMENT — PAIN DESCRIPTION - ORIENTATION
ORIENTATION: RIGHT

## 2023-11-22 ASSESSMENT — PAIN - FUNCTIONAL ASSESSMENT
PAIN_FUNCTIONAL_ASSESSMENT: PREVENTS OR INTERFERES SOME ACTIVE ACTIVITIES AND ADLS

## 2023-11-22 ASSESSMENT — ENCOUNTER SYMPTOMS
SHORTNESS OF BREATH: 0
DIARRHEA: 0
ABDOMINAL DISTENTION: 0
NAUSEA: 0
CONSTIPATION: 0
ABDOMINAL PAIN: 0
VOMITING: 0

## 2023-11-22 ASSESSMENT — PAIN SCALES - GENERAL
PAINLEVEL_OUTOF10: 4
PAINLEVEL_OUTOF10: 10
PAINLEVEL_OUTOF10: 4

## 2023-11-22 ASSESSMENT — PAIN DESCRIPTION - PAIN TYPE
TYPE: SURGICAL PAIN

## 2023-11-22 ASSESSMENT — PAIN DESCRIPTION - LOCATION
LOCATION: HIP

## 2023-11-22 ASSESSMENT — PAIN DESCRIPTION - FREQUENCY
FREQUENCY: INTERMITTENT
FREQUENCY: CONTINUOUS
FREQUENCY: INTERMITTENT

## 2023-11-22 ASSESSMENT — PAIN DESCRIPTION - ONSET: ONSET: ON-GOING

## 2023-11-22 NOTE — CARE COORDINATION
Current IP Meds  Collapse  Hide  (24h ago, onward)    Frequency    0.9 % sodium chloride infusion  (Saline Flushes)         PRN    0.9 % sodium chloride infusion         CONTINUOUS    0.9 % sodium chloride infusion         PRN    0.9 % sodium chloride infusion         PRN    0.9 % sodium chloride infusion         PRN    acetaminophen (TYLENOL) suppository 650 mg  (acetaminophen PO or OK panel)        \"Or\" Linked Group Details    EVERY 6 HOURS PRN    acetaminophen (TYLENOL) tablet 650 mg  (acetaminophen PO or OK panel)        \"Or\" Linked Group Details    EVERY 6 HOURS PRN    acetaminophen (TYLENOL) tablet 650 mg  (Acetaminophen)         EVERY 6 HOURS    apixaban (ELIQUIS) tablet 2.5 mg  (apixaban (ELIQUIS) panel)        Question: Indication of Use Answer: Post-Op Knee/Hip    2 TIMES DAILY    donepezil (ARICEPT) tablet 10 mg         NIGHTLY    levothyroxine (SYNTHROID) tablet 75 mcg         DAILY    magnesium hydroxide (MILK OF MAGNESIA) 400 MG/5ML suspension 30 mL  (Bowel Management - 1st Line PRN)         DAILY PRN    magnesium sulfate 2000 mg in 50 mL IVPB premix         PRN    morphine (PF) injection 2 mg  (MORPHINE (MODERATE AND SEVERE) PAIN PANEL-POST-OP)        \"Or\" Linked Group Details    EVERY 2 HOURS PRN    morphine (PF) injection 4 mg  (MORPHINE (MODERATE AND SEVERE) PAIN PANEL-POST-OP)        \"Or\" Linked Group Details    EVERY 2 HOURS PRN    naloxone (NARCAN) injection 0.4 mg         PRN    ondansetron (ZOFRAN) injection 4 mg  (ondansetron (ZOFRAN) ODT or ondansetron (ZOFRAN) IV)        \"Or\" Linked Group Details    EVERY 6 HOURS PRN    ondansetron (ZOFRAN-ODT) disintegrating tablet 4 mg  (ondansetron (ZOFRAN) ODT or ondansetron (ZOFRAN) IV)        \"Or\" Linked Group Details    EVERY 8 HOURS PRN    oxyCODONE (ROXICODONE) immediate release tablet 5 mg  (Oxycodone - (MODERATE AND SEVERE) Pain Panel)        \"Or\" Linked Group Details    EVERY 4 HOURS PRN    oxyCODONE HCl (OXY-IR) immediate release tablet 10 mg

## 2023-11-22 NOTE — PROGRESS NOTES
Physical Therapy  Name: Kerry Perry  MRN:  [id number]  Date of service:  11/22/2023 11/22/23 1439   Restrictions/Precautions   Restrictions/Precautions Fall Risk   Lower Extremity Weight Bearing Restrictions   Right Lower Extremity Weight Bearing Weight Bearing As Tolerated   Position Activity Restriction   Hip Precautions Anterior hip precautions   Other position/activity restrictions WBAT R LE   Subjective   Subjective Pt agreed to therapy. Pain Assessment   Pain Assessment 0-10   Pain Level 4   Pain Location Hip   Pain Orientation Right   Pain Descriptors Aching   Functional Pain Assessment Prevents or interferes some active activities and ADLs   Pain Type Surgical pain   Pain Frequency Intermittent   Non-Pharmaceutical Pain Intervention(s) Ambulation/Increased Activity;Repositioned; Rest   Response to Pain Intervention Patient satisfied   Bed Mobility   Sit to Supine Stand by assistance  (used gait belt to assist RLE into bed)   Transfers   Sit to Stand Minimal Assistance   Stand to Sit Contact guard assistance   Ambulation   WB Status WBAT   Ambulation   Surface Level tile   Device Rolling Walker   Assistance Contact guard assistance   Gait Deviations Slow [place];Decreased step length;Decreased step height   Distance 35'   Comments v/c's for increased offloading in UE's to improve LLE step   Short Term Goals   Time Frame for Short Term Goals 2 wks   Short Term Goal 1 Ambulate 100ft with RW, CGA   Short Term Goal 2 Sit<>stand, SBA   Short Term Goal 3 Steps, CGA   Conditions Requiring Skilled Therapeutic Intervention   Body Structures, Functions, Activity Limitations Requiring Skilled Therapeutic Intervention Decreased functional mobility ; Decreased ROM; Decreased endurance; Increased pain   Assessment Pt able to amb short distance without LOB. Reporting less pain this afternoon. With fatigue towards end of distance pt became more antalgic. Assisted back to bed with all needs in reach.    Activity Tolerance

## 2023-11-22 NOTE — CARE COORDINATION
Pt/family requested a referral to [facility]. Referral has been sent. [facility]   X   fax  Electronically signed by Guillermina Guaman on 11/22/2023 at 12:25 PM      [place] [address] #[id number] ([id number]) (DJK:[id number] 70 y.o.  F) (Adm: 11/19/23)  [phone]  PCP    None  Demographics  Comment        Address   [facility]    Home Phone   [phone]    Work Phone       Mobile Phone   [phone]             Social Security Number       [address] [place] [address] MEDICARE    Marital Status       [address] as of 11/22/2023    BCBS MEDICARE PlanKathlean Shaver [facility] LOCAL Group: [id number] Member: [id number]   Effective from: 1/1/2016 Subscriber: Shirley Kruse Subscriber ID: [id number]   Guarantor: Shirley Kruse     Documents Filed to Patient    Power of  Living Will Clinical Unknown Study Attachment Consent Form ABN Waiver After Visit Summary Lab Result Scan Code Status MyChart Status Advance Care Planning    Not on File  Not on File  Not on File  Not on File  Not on File  Not on File  Not on File  Not on File  FULL [Updated on 11/19/23 1502]  Pending Jump to the Activity      Auth/Cert Information    Open auth/cert linked to hospital account [de-identified]      Patient Contacts    Name Relation Home Work Wilsey  [phone]       Admission Information    Current Information    Attending Provider Admitting Provider Admission Type Admission Status   MD Delia Ang MD Emergency Confirmed Admission          Admission Date/Time Discharge Date Hospital Service Auth/Cert Status   41/70/43  [address] [facility] Unit Room/Bed    [address] [facility] Account    Name Acct ID Class Status Primary [address] 22 [id number] [facility]

## 2023-11-22 NOTE — PROGRESS NOTES
[facility] Hospitalists      Patient:  Mono Bah  YOB: 1945  Date of Service: 11/22/2023  MRN: [id number]   Acct: [de-identified]   Primary Care Physician: No primary care provider on file. Advance Directive: Full Code  Admit Date: 11/19/2023       Hospital Day: 3  Portions of this note have been copied forward, however, changed to reflect the most current clinical status of this patient. CHIEF COMPLAINT   Fall    SUBJECTIVE:  Resting in the bed. States she has been up without difficulty. PT/OT evaluation and recommendation, following. Pain controled. Tolerating diet. Requesting home health referral.      [address]:  The patient is a 70 y.o. female with hypothyroidism, VICTORIA s/p hypoglossal nerve stimulation implant complaining of fall. Patient mechanical fall today while climbing on steps landing on her right hip, she was unable to bear weight and had significant pain thus notify EMS. She denies lightheadedness or syncopal event. Denies fever, chills, nausea, vomiting, abdominal pain, shortness of breath or chest pain. Denies neck or back pain. Work up in [address] [place] [address] no acute cardiopulmonary process, XR right femur acute right femoral neck fracture, CT right hip communicated displaced and angulated right femoral neck fracture. Patient is to be admitted to hospitalist service with consultation orthopedic surgery. PT/OT evaluation and recommendation for discharge, Case management to assist with discharge planning. Monitor HH, hgb 9.0, slight decrease 12.0 post op. Hgb 6.6, transfused with 1 unit PRBC, will recheck post transfusion, 7.8, repeat in the AM.  Home health referral at discharge. Pain controled. PT/OT per order. Continue to monitor HH Q 6hrs, this AM 6.9/21.3, stat HH for post transfusion from AM, 8.4. Continue PT/OT. Stool for occult blood, evaluation of low hgb, rule out GI bleed. VSS. Bowel regime adjusted for constipation.       Review of Systems: chloride      sodium chloride      sodium chloride      sodium chloride      sodium chloride 100 mL/hr at 11/21/23 2018      rOPINIRole  0.25 mg Oral Nightly    donepezil  10 mg Oral Nightly    levothyroxine  75 mcg Oral Daily    sodium chloride flush  5-40 mL IntraVENous 2 times per day    acetaminophen  650 mg Oral Q6H    sennosides-docusate sodium  1 tablet Oral BID    apixaban  2.5 mg Oral BID     sodium chloride, sodium chloride, sodium chloride, potassium chloride **OR** potassium alternative oral replacement **OR** potassium chloride, magnesium sulfate, acetaminophen **OR** acetaminophen, naloxone, sodium chloride flush, sodium chloride, ondansetron **OR** ondansetron, oxyCODONE **OR** oxyCODONE, morphine **OR** morphine, magnesium hydroxide, polyethylene glycol  ADULT DIET; Regular; Low Fat/Low Chol/High Fiber/FERMIN     Lab and other Data:     Recent Labs     11/19/23  1314 11/20/23  0219 11/21/23  0215 11/21/23  0956 11/22/23  0241   WBC 6.7  --  7.2  --  6.6   HGB 12.0   < > 6.6* 7.8* 6.9*     --  87*  --  89*    < > = values in this interval not displayed. Recent Labs     11/20/23  0219 11/21/23  0215 11/22/23  0241    140 139   K 5.0 4.0 3.6    110 110   CO2 24 25 25   BUN 11 11 7*   CREATININE 0.9 0.8 0.6   GLUCOSE 171* 122* 106       Recent Labs     11/19/23  1314   AST 23   ALT 25   BILITOT 0.3   ALKPHOS 123*       Troponin T: No results for input(s): \"TROPONINI\" in the last 72 hours. Pro-BNP: No results for input(s): \"BNP\" in the last 72 hours. INR:   Recent Labs     11/19/23  1314   INR 1.16       UA:No results for input(s): \"NITRITE\", \"COLORU\", \"PHUR\", \"LABCAST\", \"WBCUA\", \"RBCUA\", \"MUCUS\", \"TRICHOMONAS\", \"YEAST\", \"BACTERIA\", \"CLARITYU\", \"SPECGRAV\", \"LEUKOCYTESUR\", \"UROBILINOGEN\", \"BILIRUBINUR\", \"BLOODU\", \"GLUCOSEU\", \"AMORPHOUS\" in the last 72 hours. Invalid input(s): \"KETONESU\"  A1C: No results for input(s): \"LABA1C\" in the last 72 hours.   ABG:No results for input(s):

## 2023-11-22 NOTE — CARE COORDINATION
Pt has been accepted at [facility] PENDING insurance pre-cert. Admissions will notify this SW once pre-cert has cleared.      [facility]   H  [phone] admissions fax  Electronically signed by Luzma Young on 11/22/2023 at 1:16 PM

## 2023-11-22 NOTE — PROGRESS NOTES
Physical Therapy  Name: Marifer Rangel  MRN:  [id number]  Date of service:  11/22/2023 11/22/23 1024   Restrictions/Precautions   Restrictions/Precautions Fall Risk   Lower Extremity Weight Bearing Restrictions   Right Lower Extremity Weight Bearing Weight Bearing As Tolerated   Position Activity Restriction   Hip Precautions Anterior hip precautions   Other position/activity restrictions WBAT R LE   Subjective   Subjective Pt agreed to therapy. Pain Assessment   Pain Assessment 0-10   Pain Level 4   Pain Location Hip   Pain Orientation Right   Pain Descriptors Aching   Functional Pain Assessment Prevents or interferes some active activities and ADLs   Pain Type Surgical pain   Pain Frequency Intermittent   Non-Pharmaceutical Pain Intervention(s) Ambulation/Increased Activity;Repositioned; Rest   Response to Pain Intervention Patient satisfied   Transfers   Sit to Stand Contact guard assistance;Minimal Assistance   Stand to Sit Contact guard assistance   Ambulation   WB Status WBAT   Ambulation   Surface Level tile   Device Rolling Walker   Assistance Contact guard assistance   Gait Deviations Slow [place];Decreased step length;Decreased step height   Distance 30'   Comments v/c's given to increase step height on LLE, able to intermittently improve   Short Term Goals   Time Frame for Short Term Goals 2 wks   Short Term Goal 1 Ambulate 100ft with RW, CGA   Short Term Goal 2 Sit<>stand, SBA   Short Term Goal 3 Steps, CGA   Conditions Requiring Skilled Therapeutic Intervention   Body Structures, Functions, Activity Limitations Requiring Skilled Therapeutic Intervention Decreased functional mobility ; Decreased ROM; Decreased endurance; Increased pain   Assessment Pt able to amb increased distance and showed improvements in gait. Tolerated fairly well reporting low pain after amb. Back in chair with all needs in reach.    Activity Tolerance   Activity Tolerance Patient tolerated treatment well   PT Plan of Care

## 2023-11-23 LAB
HCT VFR BLD AUTO: 24.6 % (ref 37–47)
HCT VFR BLD AUTO: 25.2 % (ref 37–47)
HCT VFR BLD AUTO: 26.2 % (ref 37–47)
HCT VFR BLD AUTO: 26.6 % (ref 37–47)
HEMOCCULT SP1 STL QL: NORMAL
HGB BLD-MCNC: 8 G/DL (ref 12–16)
HGB BLD-MCNC: 8.1 G/DL (ref 12–16)
HGB BLD-MCNC: 8.3 G/DL (ref 12–16)
HGB BLD-MCNC: 8.4 G/DL (ref 12–16)

## 2023-11-23 PROCEDURE — 94760 N-INVAS EAR/PLS OXIMETRY 1: CPT

## 2023-11-23 PROCEDURE — 85018 HEMOGLOBIN: CPT

## 2023-11-23 PROCEDURE — 6370000000 HC RX 637 (ALT 250 FOR IP)

## 2023-11-23 PROCEDURE — 82270 OCCULT BLOOD FECES: CPT

## 2023-11-23 PROCEDURE — 85014 HEMATOCRIT: CPT

## 2023-11-23 PROCEDURE — 6370000000 HC RX 637 (ALT 250 FOR IP): Performed by: ORTHOPAEDIC SURGERY

## 2023-11-23 PROCEDURE — 6370000000 HC RX 637 (ALT 250 FOR IP): Performed by: INTERNAL MEDICINE

## 2023-11-23 PROCEDURE — 97116 GAIT TRAINING THERAPY: CPT

## 2023-11-23 PROCEDURE — 36415 COLL VENOUS BLD VENIPUNCTURE: CPT

## 2023-11-23 PROCEDURE — 6370000000 HC RX 637 (ALT 250 FOR IP): Performed by: STUDENT IN AN ORGANIZED HEALTH CARE EDUCATION/TRAINING PROGRAM

## 2023-11-23 PROCEDURE — 1210000000 HC MED SURG R&B

## 2023-11-23 RX ADMIN — POLYETHYLENE GLYCOL 3350 17 G: 17 POWDER, FOR SOLUTION ORAL at 08:31

## 2023-11-23 RX ADMIN — SENNOSIDES, DOCUSATE SODIUM 1 TABLET: 8.6; 5 TABLET ORAL at 21:26

## 2023-11-23 RX ADMIN — APIXABAN 2.5 MG: 2.5 TABLET, FILM COATED ORAL at 21:24

## 2023-11-23 RX ADMIN — ACETAMINOPHEN 650 MG: 325 TABLET ORAL at 14:38

## 2023-11-23 RX ADMIN — LACTULOSE 20 G: 20 SOLUTION ORAL at 08:30

## 2023-11-23 RX ADMIN — DONEPEZIL HYDROCHLORIDE 10 MG: 10 TABLET, FILM COATED ORAL at 21:26

## 2023-11-23 RX ADMIN — ROPINIROLE HYDROCHLORIDE 0.25 MG: 0.5 TABLET, FILM COATED ORAL at 21:24

## 2023-11-23 RX ADMIN — ACETAMINOPHEN 650 MG: 325 TABLET ORAL at 07:42

## 2023-11-23 RX ADMIN — LINACLOTIDE 145 MCG: 145 CAPSULE, GELATIN COATED ORAL at 11:54

## 2023-11-23 RX ADMIN — LEVOTHYROXINE SODIUM 75 MCG: 75 TABLET ORAL at 07:42

## 2023-11-23 RX ADMIN — SENNOSIDES, DOCUSATE SODIUM 1 TABLET: 8.6; 5 TABLET ORAL at 08:31

## 2023-11-23 RX ADMIN — APIXABAN 2.5 MG: 2.5 TABLET, FILM COATED ORAL at 08:31

## 2023-11-23 RX ADMIN — ACETAMINOPHEN 650 MG: 325 TABLET ORAL at 21:24

## 2023-11-23 ASSESSMENT — ENCOUNTER SYMPTOMS
SHORTNESS OF BREATH: 0
ABDOMINAL PAIN: 0
CONSTIPATION: 0
VOMITING: 0
DIARRHEA: 0
NAUSEA: 0
ABDOMINAL DISTENTION: 0

## 2023-11-23 ASSESSMENT — PAIN DESCRIPTION - ORIENTATION: ORIENTATION: RIGHT

## 2023-11-23 ASSESSMENT — PAIN DESCRIPTION - LOCATION: LOCATION: HIP

## 2023-11-23 ASSESSMENT — PAIN SCALES - GENERAL: PAINLEVEL_OUTOF10: 8

## 2023-11-23 NOTE — PROGRESS NOTES
[facility] Hospitalists      Patient:  Jyothi Kaye  YOB: 1945  Date of Service: 11/23/2023  MRN: [id number]   Acct: [de-identified]   Primary Care Physician: No primary care provider on file. Advance Directive: Full Code  Admit Date: 11/19/2023       Hospital Day: 4  Portions of this note have been copied forward, however, changed to reflect the most current clinical status of this patient. CHIEF COMPLAINT   Fall    SUBJECTIVE:  Resting in the bed. States she has been up without difficulty. PT/OT evaluation and recommendation, following. Pain controled. Tolerating diet. Requesting home health referral.      [address]:  The patient is a 70 y.o. female with hypothyroidism, VICTORIA s/p hypoglossal nerve stimulation implant complaining of fall. Patient mechanical fall today while climbing on steps landing on her right hip, she was unable to bear weight and had significant pain thus notify EMS. She denies lightheadedness or syncopal event. Denies fever, chills, nausea, vomiting, abdominal pain, shortness of breath or chest pain. Denies neck or back pain. Work up in [address] [place] [address] no acute cardiopulmonary process, XR right femur acute right femoral neck fracture, CT right hip communicated displaced and angulated right femoral neck fracture. Patient is to be admitted to hospitalist service with consultation orthopedic surgery. PT/OT evaluation and recommendation for discharge, Case management to assist with discharge planning. Monitor HH, hgb 9.0, slight decrease 12.0 post op. Hgb 6.6, transfused with 1 unit PRBC, will recheck post transfusion, 7.8, repeat in the AM.  Home health referral at discharge. Pain controled. PT/OT per order. Continue to monitor HH Q 6hrs, this AM 6.9/21.3, stat HH for post transfusion from AM, 8.4. Continue PT/OT. Stool for occult blood, evaluation of low hgb, rule out GI bleed. VSS. Bowel regime adjusted for constipation.       HGB stable this AM.

## 2023-11-23 NOTE — PROGRESS NOTES
11/23/23 1400   Subjective   Subjective I would like to go for a walk.    Pain Assessment   Pain Assessment 0-10   Pain Level 8  (With WB)   Patient's Stated Pain Goal 0 - No pain   Pain Location Hip   Pain Orientation Right   Vitals   O2 Device None (Room air)   Bed Mobility Training   Bed Mobility Training Yes   Supine to Sit Stand-by assistance   Sit to Supine Minimum assistance   Transfer Training   Transfer Training Yes   Sit to Stand Contact-guard assistance   Stand to Sit Contact-guard assistance   Gait Training   Gait Training Yes   Gait   Overall Level of Assistance Contact-guard assistance   Distance (ft) [address] [facility]   Patient Education   Education Given To Patient   Education Provided Role of Therapy;Plan of Care;Transfer Training   Education Method Verbal   Barriers to Learning None   Education Outcome Verbalized understanding   Other Specialty Interventions   Other Treatments/Modalities BTB alarm set call light needs in reach   Assessment   Activity Tolerance Patient tolerated treatment well   PT Plan of Care   Thursday X       Electronically signed by Markos Iyer PTA on 11/23/2023 at 2:27 PM

## 2023-11-24 VITALS
OXYGEN SATURATION: 97 % | HEART RATE: 73 BPM | SYSTOLIC BLOOD PRESSURE: 117 MMHG | RESPIRATION RATE: 16 BRPM | BODY MASS INDEX: 23.49 KG/M2 | HEIGHT: 68 IN | DIASTOLIC BLOOD PRESSURE: 66 MMHG | TEMPERATURE: 96.6 F | WEIGHT: 155 LBS

## 2023-11-24 LAB
HCT VFR BLD AUTO: 24.9 % (ref 37–47)
HCT VFR BLD AUTO: 28.9 % (ref 37–47)
HGB BLD-MCNC: 8 G/DL (ref 12–16)
HGB BLD-MCNC: 9.3 G/DL (ref 12–16)

## 2023-11-24 PROCEDURE — 94760 N-INVAS EAR/PLS OXIMETRY 1: CPT

## 2023-11-24 PROCEDURE — 85018 HEMOGLOBIN: CPT

## 2023-11-24 PROCEDURE — 97116 GAIT TRAINING THERAPY: CPT

## 2023-11-24 PROCEDURE — 6370000000 HC RX 637 (ALT 250 FOR IP): Performed by: ORTHOPAEDIC SURGERY

## 2023-11-24 PROCEDURE — 36415 COLL VENOUS BLD VENIPUNCTURE: CPT

## 2023-11-24 PROCEDURE — 6370000000 HC RX 637 (ALT 250 FOR IP)

## 2023-11-24 PROCEDURE — 85014 HEMATOCRIT: CPT

## 2023-11-24 RX ORDER — SENNA AND DOCUSATE SODIUM 50; 8.6 MG/1; MG/1
1 TABLET, FILM COATED ORAL 2 TIMES DAILY PRN
Qty: 60 TABLET | Refills: 0 | Status: SHIPPED | OUTPATIENT
Start: 2023-11-24 | End: 2023-12-24

## 2023-11-24 RX ORDER — POLYETHYLENE GLYCOL 3350 17 G/17G
17 POWDER, FOR SOLUTION ORAL DAILY
Qty: 30 PACKET | Refills: 0 | Status: SHIPPED | OUTPATIENT
Start: 2023-11-24 | End: 2023-12-24

## 2023-11-24 RX ORDER — OXYCODONE HYDROCHLORIDE 5 MG/1
5 TABLET ORAL EVERY 12 HOURS PRN
Qty: 6 TABLET | Refills: 0 | Status: SHIPPED | OUTPATIENT
Start: 2023-11-24 | End: 2023-11-27

## 2023-11-24 RX ORDER — LEVOTHYROXINE SODIUM 0.07 MG/1
75 TABLET ORAL DAILY
Qty: 30 TABLET | Refills: 3 | Status: SHIPPED | OUTPATIENT
Start: 2023-11-25

## 2023-11-24 RX ADMIN — APIXABAN 2.5 MG: 2.5 TABLET, FILM COATED ORAL at 11:15

## 2023-11-24 RX ADMIN — ACETAMINOPHEN 650 MG: 325 TABLET ORAL at 07:57

## 2023-11-24 RX ADMIN — LINACLOTIDE 145 MCG: 145 CAPSULE, GELATIN COATED ORAL at 11:16

## 2023-11-24 RX ADMIN — LEVOTHYROXINE SODIUM 75 MCG: 75 TABLET ORAL at 07:57

## 2023-11-24 ASSESSMENT — PAIN DESCRIPTION - PAIN TYPE: TYPE: SURGICAL PAIN

## 2023-11-24 ASSESSMENT — PAIN - FUNCTIONAL ASSESSMENT: PAIN_FUNCTIONAL_ASSESSMENT: PREVENTS OR INTERFERES SOME ACTIVE ACTIVITIES AND ADLS

## 2023-11-24 ASSESSMENT — PAIN DESCRIPTION - LOCATION: LOCATION: HIP

## 2023-11-24 ASSESSMENT — PAIN DESCRIPTION - ORIENTATION: ORIENTATION: RIGHT

## 2023-11-24 ASSESSMENT — PAIN DESCRIPTION - FREQUENCY: FREQUENCY: CONTINUOUS

## 2023-11-24 ASSESSMENT — PAIN SCALES - GENERAL: PAINLEVEL_OUTOF10: 6

## 2023-11-24 ASSESSMENT — PAIN DESCRIPTION - DESCRIPTORS: DESCRIPTORS: ACHING;SORE

## 2023-11-24 NOTE — PROGRESS NOTES
Physical Therapy  Name: Serg Elias  MRN:  [id number]  Date of service:  11/24/2023 11/24/23 1434   Subjective   Subjective Attempt: Pt reports she will be leaving soon this afternoon and wants to rest at this time.            Electronically signed by Valerie Carrasco PTA on 11/24/2023 at 2:35 PM

## 2023-11-24 NOTE — PROGRESS NOTES
Physical Therapy  Name: Mitch Macdonald  MRN:  [id number]  Date of service:  11/24/2023 11/24/23 0846   Restrictions/Precautions   Restrictions/Precautions Fall Risk   Lower Extremity Weight Bearing Restrictions   Right Lower Extremity Weight Bearing Weight Bearing As Tolerated   Position Activity Restriction   Hip Precautions Anterior hip precautions   Other position/activity restrictions WBAT R LE   Subjective   Subjective Pt agreed to therapy. Pain Assessment   Pain Assessment 0-10   Pain Level 6   Pain Location Hip   Pain Orientation Right   Pain Descriptors Aching; Sore   Functional Pain Assessment Prevents or interferes some active activities and ADLs   Pain Type Surgical pain   Pain Frequency Continuous   Non-Pharmaceutical Pain Intervention(s) Ambulation/Increased Activity;Repositioned; Rest   Response to Pain Intervention Patient satisfied   Transfers   Sit to Stand Contact guard assistance   Stand to Sit Contact guard assistance   Ambulation   WB Status WBAT   Ambulation   Surface Level tile   Device Rolling Walker   Assistance Contact guard assistance   Gait Deviations Slow [place];Decreased step length;Decreased step height   Distance 40'   Short Term Goals   Time Frame for Short Term Goals 2 wks   Short Term Goal 1 Ambulate 100ft with RW, CGA   Short Term Goal 2 Sit<>stand, SBA   Short Term Goal 3 Steps, CGA   Conditions Requiring Skilled Therapeutic Intervention   Body Structures, Functions, Activity Limitations Requiring Skilled Therapeutic Intervention Decreased functional mobility ; Decreased ROM; Decreased endurance; Increased pain   Assessment Pt able to work on amb into hallway without having LOB. With cueing to decrease R step length to decrease antalgia pt improved step quality. Pt has difficulty offloading through LUE due to previous injury to L hand. Assisted to chair with all needs in reach.    Activity Tolerance   Activity Tolerance Patient tolerated treatment well   PT Plan of Care   Friday X   Safety Devices   Type of Devices Call light within reach; Chair alarm in place; Left in chair         Electronically signed by Madison Mitchell PTA on 11/24/2023 at 8:51 AM

## 2023-11-24 NOTE — CARE COORDINATION
Pt has been accepted at [facility] and insurance has approved. Patient can discharge Friday, Nov 24, if medically stable.     [facility]   M   admissions fax  Electronically signed by Brooklyn Neville RN, BSN on 11/24/2023 at 10:41 AM

## 2023-11-24 NOTE — DISCHARGE SUMMARY
Dave Henry  :  1945  MRN:  [id number]    Admit date:  2023 Discharge date:  2023    Discharging Physician:  Dr Riaz Garcia Directive: Full Code    Consults: Christina Burnett     Primary Care Physician:  No primary care provider on file. Discharge Diagnoses:  Principal Problem:    Closed displaced fracture of right femoral neck ([address])  Active Problems:    Hypothyroidism  Resolved Problems:    * No resolved hospital problems. *      Portions of this note have been copied forward, however, changed to reflect the most current clinical status of this patient. Hospital Course: The patient is a 70 y.o. female with hypothyroidism, VICTORIA s/p hypoglossal nerve stimulation implant complaining of fall. Patient mechanical fall today while climbing on steps landing on her right hip, she was unable to bear weight and had significant pain thus notify EMS. She denies lightheadedness or syncopal event. Denies fever, chills, nausea, vomiting, abdominal pain, shortness of breath or chest pain. Denies neck or back pain. Work up in [address] [place] [address] no acute cardiopulmonary process, XR right femur acute right femoral neck fracture, CT right hip communicated displaced and angulated right femoral neck fracture. Patient was admitted to hospitalist service with consultation to orthopedic surgery. PT/OT evaluation and services during admission. Was determined for additional therapy at discharge. Case management assisted with discharge to [facility]. Hgb stable at discharge. Stool for occult blood negative. Bowel regime adjusted for constipation. Pain control maintained with current pain medication. Constipation resolved will continue Linzess at discharge. Stable for discharge at this time. Instructed to follow up with Orthopedic per scheduled appointment.   Will follow up with PCP at facility for medical management, and have facility set up with Provider at discharge. Significant Diagnostic Studies:   XR HIP 2-3 VW W PELVIS RIGHT    Result Date: 11/19/2023  EXAM:  PELVIS AP VIEW, RIGHT HIP TWO VIEWS  HISTORY:  Total hip arthroplasty  FINDINGS:  Prosthesis appears seated well placed without evidence of fracture or dislocation. 1.  Satisfactory postoperative radiograph.     ______________________________________ Electronically signed by: Ayesha Witt M.D. Date:     11/19/2023 Time:    20:07     XR HIP 2-3 VW W PELVIS RIGHT    Result Date: 11/19/2023  Radiology exam is complete. No Radiologist dictation. Please follow up with ordering provider. CT HIP RIGHT WO CONTRAST    Result Date: 11/19/2023    EXAM:  CT RIGHT HIP WITHOUT CONTRAST  HISTORY: Right hip fracture, preop planning. COMPARISON:  Correlation with radiographs of the right femur from earlier the same day. Kiana Pretty FINDINGS:  Axial CT images of the right hip without contrast and multiplanar reformatted images. 3-D/MIP reformatted images. Generalized osteopenia. Comminuted, displaced and angulated right femoral neck fracture. The fracture line appears predominately subcapital with perhaps some transcervical extension. Mild superior overriding and posterior displacement of the femoral neck relative to the femoral head. The femoral head is appropriately aligned in the acetabulum. Mild overlying soft tissue edema. Small joint effusion. Mild degenerative changes to the hip, pubic symphysis and right sacroiliac joint. Comminuted, displaced and angulated right femoral neck fracture. Please see above description and associated and additional findings. No dislocation. Joint spaces are preserved. If symptoms persist, repeat radiographs in 7-10 days are recommended.   All CT scans are performed using dose optimization techniques as appropriate to the performed exam and include at least one of the following: Automated exposure control, adjustment of the mA and/or kV according to size, and the use

## 2023-11-24 NOTE — CARE COORDINATION
2nd IMM Letter given to patient. Reviewed, discussed, answered questions, and signed by patient. Declined to stay 4 hours prior to discharge. Signed copy placed in patient's chart.      11/24/23 1325   IMM Letter   IMM Letter given to Patient/Family/Significant other/Guardian/POA/by: given to patient by Danyel Flores RN BSN    IMM Letter date given: 11/24/23   IMM Letter time given: 12     Electronically signed by Danyel Flores RN, BSN on 11/24/2023 at 1:26 PM

## 2023-11-24 NOTE — PROGRESS NOTES
Report called to [facility]. Pts daughter and son in law at bedside, discharge instructions reviewed, daughter bringing pt via car to rehab. All belongings accounted for. Pts bandage clean dry intact, hematoma still remains within outlined areas. No drop in blood pressure, no dizziness, no increased swelling or other s/sx of increased bleeding. Pt in agreement with discharge plan.

## 2023-11-25 LAB
BLOOD BANK DISPENSE STATUS: NORMAL
BLOOD BANK PRODUCT CODE: NORMAL
BPU ID: NORMAL
DESCRIPTION BLOOD BANK: NORMAL

## 2023-11-27 ENCOUNTER — TELEPHONE (OUTPATIENT)
Dept: INPATIENT UNIT | Age: 78
End: 2023-11-27

## 2024-03-06 ENCOUNTER — OFFICE VISIT (OUTPATIENT)
Dept: SURGERY | Age: 79
End: 2024-03-06

## 2024-03-06 VITALS
WEIGHT: 140 LBS | HEIGHT: 68 IN | BODY MASS INDEX: 21.22 KG/M2 | SYSTOLIC BLOOD PRESSURE: 116 MMHG | TEMPERATURE: 97.3 F | DIASTOLIC BLOOD PRESSURE: 74 MMHG

## 2024-03-06 DIAGNOSIS — K45.8 HERNIA OF FLANK: Primary | ICD-10-CM

## 2024-03-06 DIAGNOSIS — K43.9 ABDOMINAL WALL HERNIA: ICD-10-CM

## 2024-03-06 RX ORDER — ZINC GLUCONATE 50 MG
50 TABLET ORAL DAILY
COMMUNITY

## 2024-03-06 ASSESSMENT — ENCOUNTER SYMPTOMS
EYE REDNESS: 0
CONSTIPATION: 0
COUGH: 0
DIARRHEA: 0
CHEST TIGHTNESS: 0
COLOR CHANGE: 0
BACK PAIN: 0
VOMITING: 0
ABDOMINAL DISTENTION: 0
SORE THROAT: 0
ABDOMINAL PAIN: 1
SHORTNESS OF BREATH: 0
EYE PAIN: 0
APNEA: 1
NAUSEA: 0

## 2024-03-06 NOTE — PROGRESS NOTES
Use Topics    Alcohol use: Never       Review of Systems   Constitutional:  Negative for fatigue, fever and unexpected weight change.   HENT:  Negative for hearing loss, nosebleeds and sore throat.    Eyes:  Negative for pain, redness and visual disturbance.   Respiratory:  Positive for apnea. Negative for cough, chest tightness and shortness of breath.    Cardiovascular:  Negative for chest pain, palpitations and leg swelling.   Gastrointestinal:  Positive for abdominal pain. Negative for abdominal distention (bulge), constipation, diarrhea, nausea and vomiting.   Endocrine: Negative for cold intolerance, heat intolerance and polydipsia.   Genitourinary:  Negative for difficulty urinating, frequency and urgency.   Musculoskeletal:  Positive for gait problem. Negative for back pain, joint swelling and neck pain.   Skin:  Negative for color change, rash and wound.   Allergic/Immunologic: Negative for environmental allergies and food allergies.   Neurological:  Negative for seizures, light-headedness and headaches.   Hematological:  Negative for adenopathy. Does not bruise/bleed easily.   Psychiatric/Behavioral:  Negative for confusion, sleep disturbance and suicidal ideas.        OBJECTIVE:  /74 (Site: Left Upper Arm, Position: Sitting, Cuff Size: Medium Adult)   Temp 97.3 °F (36.3 °C) (Temporal)   Ht 1.727 m (5' 8\")   Wt 63.5 kg (140 lb)   BMI 21.29 kg/m²   Physical Exam  Vitals reviewed.   Constitutional:       Appearance: Normal appearance. She is well-developed.   HENT:      Head: Normocephalic and atraumatic.   Eyes:      Pupils: Pupils are equal, round, and reactive to light.   Cardiovascular:      Rate and Rhythm: Normal rate and regular rhythm.      Heart sounds: Normal heart sounds.   Pulmonary:      Effort: Pulmonary effort is normal.      Breath sounds: Normal breath sounds. No wheezing or rales.   Abdominal:      General: Bowel sounds are normal. There is no distension.      Palpations: Abdomen

## 2024-03-08 ENCOUNTER — TELEPHONE (OUTPATIENT)
Dept: SURGERY | Age: 79
End: 2024-03-08

## 2024-03-08 NOTE — TELEPHONE ENCOUNTER
Valeria Beatty with [facility] called  requesting order for CT. Please fax to [phone] or call [phone] to clarify. Pt is wanting to schedule somewhere sooner.

## 2024-03-22 ENCOUNTER — TELEPHONE (OUTPATIENT)
Dept: SURGERY | Age: 79
End: 2024-03-22

## 2024-03-22 NOTE — TELEPHONE ENCOUNTER
[facility] [place] is scheduled to have CT done on Monday there.  CT needs authorization, called patients insurance and got authorization for CT:    Authorization: [id number]  good from 03/22/24 to 09/18/24  Case # [id number]    Called scheduling at [facility] and gave them the authorization.

## 2024-03-25 DIAGNOSIS — K45.8 FLANK HERNIA: Primary | ICD-10-CM

## 2024-03-28 ENCOUNTER — TELEPHONE (OUTPATIENT)
Dept: SURGERY | Age: 79
End: 2024-03-28

## 2024-03-28 NOTE — TELEPHONE ENCOUNTER
Called patient and gave her the following appt information:  Patient scheduled to see Dr. Asif Steele in [facility] on 04/15/24 at 1:00.  Office is located at [address] [place] [address].Phone is [phone], fax is [phone].

## 2024-04-09 ENCOUNTER — PRE-ADMISSION TESTING (OUTPATIENT)
Dept: PREADMISSION TESTING | Facility: HOSPITAL | Age: 79
End: 2024-04-09
Payer: MEDICARE

## 2024-04-09 VITALS
HEART RATE: 58 BPM | SYSTOLIC BLOOD PRESSURE: 122 MMHG | BODY MASS INDEX: 23.15 KG/M2 | HEIGHT: 67 IN | RESPIRATION RATE: 16 BRPM | WEIGHT: 147.49 LBS | DIASTOLIC BLOOD PRESSURE: 62 MMHG | OXYGEN SATURATION: 98 %

## 2024-04-09 LAB
ANION GAP SERPL CALCULATED.3IONS-SCNC: 8 MMOL/L (ref 5–15)
BUN SERPL-MCNC: 10 MG/DL (ref 8–23)
BUN/CREAT SERPL: 14.5 (ref 7–25)
CALCIUM SPEC-SCNC: 9.6 MG/DL (ref 8.6–10.5)
CHLORIDE SERPL-SCNC: 107 MMOL/L (ref 98–107)
CO2 SERPL-SCNC: 28 MMOL/L (ref 22–29)
CREAT SERPL-MCNC: 0.69 MG/DL (ref 0.57–1)
DEPRECATED RDW RBC AUTO: 52.9 FL (ref 37–54)
EGFRCR SERPLBLD CKD-EPI 2021: 90.1 ML/MIN/1.73
ERYTHROCYTE [DISTWIDTH] IN BLOOD BY AUTOMATED COUNT: 15.7 % (ref 12.3–15.4)
GLUCOSE SERPL-MCNC: 101 MG/DL (ref 65–99)
HCT VFR BLD AUTO: 36.5 % (ref 34–46.6)
HGB BLD-MCNC: 11.4 G/DL (ref 12–15.9)
MCH RBC QN AUTO: 28.7 PG (ref 26.6–33)
MCHC RBC AUTO-ENTMCNC: 31.2 G/DL (ref 31.5–35.7)
MCV RBC AUTO: 91.9 FL (ref 79–97)
PLATELET # BLD AUTO: 140 10*3/MM3 (ref 140–450)
PMV BLD AUTO: 12.1 FL (ref 6–12)
POTASSIUM SERPL-SCNC: 4 MMOL/L (ref 3.5–5.2)
RBC # BLD AUTO: 3.97 10*6/MM3 (ref 3.77–5.28)
SODIUM SERPL-SCNC: 143 MMOL/L (ref 136–145)
WBC NRBC COR # BLD AUTO: 3.68 10*3/MM3 (ref 3.4–10.8)

## 2024-04-09 PROCEDURE — 85027 COMPLETE CBC AUTOMATED: CPT

## 2024-04-09 PROCEDURE — 36415 COLL VENOUS BLD VENIPUNCTURE: CPT

## 2024-04-09 PROCEDURE — 80048 BASIC METABOLIC PNL TOTAL CA: CPT

## 2024-04-09 RX ORDER — ASPIRIN 81 MG/1
81 TABLET ORAL DAILY
COMMUNITY

## 2024-04-09 RX ORDER — ALBUTEROL SULFATE 90 UG/1
2 AEROSOL, METERED RESPIRATORY (INHALATION) EVERY 4 HOURS PRN
COMMUNITY

## 2024-04-09 NOTE — DISCHARGE INSTRUCTIONS

## 2024-04-16 ENCOUNTER — ANESTHESIA EVENT (OUTPATIENT)
Dept: PERIOP | Facility: HOSPITAL | Age: 79
End: 2024-04-16
Payer: MEDICARE

## 2024-04-16 ENCOUNTER — HOSPITAL ENCOUNTER (OUTPATIENT)
Facility: HOSPITAL | Age: 79
Setting detail: HOSPITAL OUTPATIENT SURGERY
Discharge: HOME | End: 2024-04-16
Attending: SURGERY | Admitting: SURGERY
Payer: MEDICARE

## 2024-04-16 ENCOUNTER — ANESTHESIA (OUTPATIENT)
Dept: PERIOP | Facility: HOSPITAL | Age: 79
End: 2024-04-16
Payer: MEDICARE

## 2024-04-16 VITALS
OXYGEN SATURATION: 93 % | DIASTOLIC BLOOD PRESSURE: 60 MMHG | HEART RATE: 71 BPM | RESPIRATION RATE: 16 BRPM | SYSTOLIC BLOOD PRESSURE: 125 MMHG | TEMPERATURE: 97.1 F

## 2024-04-16 DIAGNOSIS — C44.91 BASAL CELL CARCINOMA: ICD-10-CM

## 2024-04-16 PROCEDURE — 25010000002 PROPOFOL 1000 MG/100ML EMULSION: Performed by: NURSE ANESTHETIST, CERTIFIED REGISTERED

## 2024-04-16 PROCEDURE — 25810000003 LACTATED RINGERS PER 1000 ML: Performed by: SURGERY

## 2024-04-16 PROCEDURE — 25010000002 PROPOFOL 10 MG/ML EMULSION: Performed by: NURSE ANESTHETIST, CERTIFIED REGISTERED

## 2024-04-16 PROCEDURE — 25010000002 CEFAZOLIN PER 500 MG: Performed by: SURGERY

## 2024-04-16 PROCEDURE — 88331 PATH CONSLTJ SURG 1 BLK 1SPC: CPT | Performed by: PATHOLOGY

## 2024-04-16 PROCEDURE — 88305 TISSUE EXAM BY PATHOLOGIST: CPT | Performed by: SURGERY

## 2024-04-16 PROCEDURE — 25010000002 FENTANYL CITRATE (PF) 100 MCG/2ML SOLUTION: Performed by: NURSE ANESTHETIST, CERTIFIED REGISTERED

## 2024-04-16 RX ORDER — SODIUM CHLORIDE 0.9 % (FLUSH) 0.9 %
3 SYRINGE (ML) INJECTION AS NEEDED
Status: DISCONTINUED | OUTPATIENT
Start: 2024-04-16 | End: 2024-04-16 | Stop reason: HOSPADM

## 2024-04-16 RX ORDER — PROPOFOL 10 MG/ML
VIAL (ML) INTRAVENOUS AS NEEDED
Status: DISCONTINUED | OUTPATIENT
Start: 2024-04-16 | End: 2024-04-16 | Stop reason: SURG

## 2024-04-16 RX ORDER — PROPOFOL 10 MG/ML
INJECTION, EMULSION INTRAVENOUS CONTINUOUS PRN
Status: DISCONTINUED | OUTPATIENT
Start: 2024-04-16 | End: 2024-04-16 | Stop reason: SURG

## 2024-04-16 RX ORDER — IBUPROFEN 600 MG/1
600 TABLET ORAL EVERY 6 HOURS PRN
Status: DISCONTINUED | OUTPATIENT
Start: 2024-04-16 | End: 2024-04-16 | Stop reason: HOSPADM

## 2024-04-16 RX ORDER — EPHEDRINE SULFATE 50 MG/ML
INJECTION INTRAVENOUS AS NEEDED
Status: DISCONTINUED | OUTPATIENT
Start: 2024-04-16 | End: 2024-04-16 | Stop reason: SURG

## 2024-04-16 RX ORDER — FENTANYL CITRATE 50 UG/ML
50 INJECTION, SOLUTION INTRAMUSCULAR; INTRAVENOUS
Status: DISCONTINUED | OUTPATIENT
Start: 2024-04-16 | End: 2024-04-16 | Stop reason: HOSPADM

## 2024-04-16 RX ORDER — ONDANSETRON 2 MG/ML
4 INJECTION INTRAMUSCULAR; INTRAVENOUS ONCE AS NEEDED
Status: DISCONTINUED | OUTPATIENT
Start: 2024-04-16 | End: 2024-04-16 | Stop reason: HOSPADM

## 2024-04-16 RX ORDER — SODIUM CHLORIDE 9 MG/ML
40 INJECTION, SOLUTION INTRAVENOUS AS NEEDED
Status: DISCONTINUED | OUTPATIENT
Start: 2024-04-16 | End: 2024-04-16 | Stop reason: HOSPADM

## 2024-04-16 RX ORDER — FENTANYL CITRATE 50 UG/ML
INJECTION, SOLUTION INTRAMUSCULAR; INTRAVENOUS AS NEEDED
Status: DISCONTINUED | OUTPATIENT
Start: 2024-04-16 | End: 2024-04-16 | Stop reason: SURG

## 2024-04-16 RX ORDER — LIDOCAINE HYDROCHLORIDE 20 MG/ML
INJECTION, SOLUTION EPIDURAL; INFILTRATION; INTRACAUDAL; PERINEURAL AS NEEDED
Status: DISCONTINUED | OUTPATIENT
Start: 2024-04-16 | End: 2024-04-16 | Stop reason: SURG

## 2024-04-16 RX ORDER — DROPERIDOL 2.5 MG/ML
0.62 INJECTION, SOLUTION INTRAMUSCULAR; INTRAVENOUS ONCE AS NEEDED
Status: DISCONTINUED | OUTPATIENT
Start: 2024-04-16 | End: 2024-04-16 | Stop reason: HOSPADM

## 2024-04-16 RX ORDER — SODIUM CHLORIDE, SODIUM LACTATE, POTASSIUM CHLORIDE, CALCIUM CHLORIDE 600; 310; 30; 20 MG/100ML; MG/100ML; MG/100ML; MG/100ML
1000 INJECTION, SOLUTION INTRAVENOUS CONTINUOUS
Status: DISCONTINUED | OUTPATIENT
Start: 2024-04-16 | End: 2024-04-16 | Stop reason: HOSPADM

## 2024-04-16 RX ORDER — LABETALOL HYDROCHLORIDE 5 MG/ML
5 INJECTION, SOLUTION INTRAVENOUS
Status: DISCONTINUED | OUTPATIENT
Start: 2024-04-16 | End: 2024-04-16 | Stop reason: HOSPADM

## 2024-04-16 RX ORDER — NALOXONE HCL 0.4 MG/ML
0.4 VIAL (ML) INJECTION AS NEEDED
Status: DISCONTINUED | OUTPATIENT
Start: 2024-04-16 | End: 2024-04-16 | Stop reason: HOSPADM

## 2024-04-16 RX ORDER — LIDOCAINE HYDROCHLORIDE AND EPINEPHRINE 10; 10 MG/ML; UG/ML
INJECTION, SOLUTION INFILTRATION; PERINEURAL AS NEEDED
Status: DISCONTINUED | OUTPATIENT
Start: 2024-04-16 | End: 2024-04-16 | Stop reason: HOSPADM

## 2024-04-16 RX ORDER — LIDOCAINE HYDROCHLORIDE 10 MG/ML
0.5 INJECTION, SOLUTION EPIDURAL; INFILTRATION; INTRACAUDAL; PERINEURAL ONCE AS NEEDED
Status: DISCONTINUED | OUTPATIENT
Start: 2024-04-16 | End: 2024-04-16 | Stop reason: HOSPADM

## 2024-04-16 RX ORDER — BUPIVACAINE HCL/0.9 % NACL/PF 0.125 %
PLASTIC BAG, INJECTION (ML) EPIDURAL AS NEEDED
Status: DISCONTINUED | OUTPATIENT
Start: 2024-04-16 | End: 2024-04-16 | Stop reason: SURG

## 2024-04-16 RX ORDER — HYDROCODONE BITARTRATE AND ACETAMINOPHEN 10; 325 MG/1; MG/1
1 TABLET ORAL EVERY 4 HOURS PRN
Status: DISCONTINUED | OUTPATIENT
Start: 2024-04-16 | End: 2024-04-16 | Stop reason: HOSPADM

## 2024-04-16 RX ORDER — HYDROCODONE BITARTRATE AND ACETAMINOPHEN 5; 325 MG/1; MG/1
1 TABLET ORAL EVERY 4 HOURS PRN
Status: DISCONTINUED | OUTPATIENT
Start: 2024-04-16 | End: 2024-04-16 | Stop reason: HOSPADM

## 2024-04-16 RX ORDER — ACETAMINOPHEN 500 MG
1000 TABLET ORAL ONCE
Status: COMPLETED | OUTPATIENT
Start: 2024-04-16 | End: 2024-04-16

## 2024-04-16 RX ORDER — MIDAZOLAM HYDROCHLORIDE 1 MG/ML
0.5 INJECTION INTRAMUSCULAR; INTRAVENOUS
Status: DISCONTINUED | OUTPATIENT
Start: 2024-04-16 | End: 2024-04-16 | Stop reason: HOSPADM

## 2024-04-16 RX ORDER — SODIUM CHLORIDE, SODIUM LACTATE, POTASSIUM CHLORIDE, CALCIUM CHLORIDE 600; 310; 30; 20 MG/100ML; MG/100ML; MG/100ML; MG/100ML
100 INJECTION, SOLUTION INTRAVENOUS CONTINUOUS
Status: DISCONTINUED | OUTPATIENT
Start: 2024-04-16 | End: 2024-04-16 | Stop reason: HOSPADM

## 2024-04-16 RX ORDER — CEPHALEXIN 500 MG/1
500 CAPSULE ORAL 3 TIMES DAILY
Qty: 9 CAPSULE | Refills: 0 | Status: SHIPPED | OUTPATIENT
Start: 2024-04-16 | End: 2024-04-19

## 2024-04-16 RX ORDER — SODIUM CHLORIDE 0.9 % (FLUSH) 0.9 %
3 SYRINGE (ML) INJECTION EVERY 12 HOURS SCHEDULED
Status: DISCONTINUED | OUTPATIENT
Start: 2024-04-16 | End: 2024-04-16 | Stop reason: HOSPADM

## 2024-04-16 RX ORDER — MAGNESIUM HYDROXIDE 1200 MG/15ML
LIQUID ORAL AS NEEDED
Status: DISCONTINUED | OUTPATIENT
Start: 2024-04-16 | End: 2024-04-16 | Stop reason: HOSPADM

## 2024-04-16 RX ORDER — SODIUM CHLORIDE 0.9 % (FLUSH) 0.9 %
3-10 SYRINGE (ML) INJECTION AS NEEDED
Status: DISCONTINUED | OUTPATIENT
Start: 2024-04-16 | End: 2024-04-16 | Stop reason: HOSPADM

## 2024-04-16 RX ORDER — TRAMADOL HYDROCHLORIDE 50 MG/1
50 TABLET ORAL EVERY 6 HOURS PRN
Qty: 6 TABLET | Refills: 0 | Status: SHIPPED | OUTPATIENT
Start: 2024-04-16

## 2024-04-16 RX ORDER — FLUMAZENIL 0.1 MG/ML
0.2 INJECTION INTRAVENOUS AS NEEDED
Status: DISCONTINUED | OUTPATIENT
Start: 2024-04-16 | End: 2024-04-16 | Stop reason: HOSPADM

## 2024-04-16 RX ADMIN — CEFAZOLIN 2000 MG: 2 INJECTION, POWDER, FOR SOLUTION INTRAMUSCULAR; INTRAVENOUS at 12:00

## 2024-04-16 RX ADMIN — SODIUM CHLORIDE, POTASSIUM CHLORIDE, SODIUM LACTATE AND CALCIUM CHLORIDE: 600; 310; 30; 20 INJECTION, SOLUTION INTRAVENOUS at 13:34

## 2024-04-16 RX ADMIN — EPHEDRINE SULFATE 10 MG: 50 INJECTION INTRAVENOUS at 12:11

## 2024-04-16 RX ADMIN — PROPOFOL 75 MCG/KG/MIN: 10 INJECTION, EMULSION INTRAVENOUS at 11:55

## 2024-04-16 RX ADMIN — PROPOFOL 30 MG: 10 INJECTION, EMULSION INTRAVENOUS at 11:53

## 2024-04-16 RX ADMIN — LIDOCAINE HYDROCHLORIDE 40 MG: 20 INJECTION, SOLUTION EPIDURAL; INFILTRATION; INTRACAUDAL; PERINEURAL at 11:53

## 2024-04-16 RX ADMIN — SODIUM CHLORIDE, POTASSIUM CHLORIDE, SODIUM LACTATE AND CALCIUM CHLORIDE 1000 ML: 600; 310; 30; 20 INJECTION, SOLUTION INTRAVENOUS at 09:10

## 2024-04-16 RX ADMIN — ACETAMINOPHEN 1000 MG: 500 TABLET, FILM COATED ORAL at 10:45

## 2024-04-16 RX ADMIN — Medication 100 MCG: at 12:55

## 2024-04-16 RX ADMIN — FENTANYL CITRATE 25 MCG: 50 INJECTION, SOLUTION INTRAMUSCULAR; INTRAVENOUS at 12:21

## 2024-04-16 RX ADMIN — Medication 100 MCG: at 12:29

## 2024-04-16 NOTE — OP NOTE
"FACIAL LESION/CYST EXCISION  Procedure Report    Patient Name:  Marily Reese  YOB: 1945    Date of Surgery:  4/16/2024    Attending Surgeon: Heri Ramirez MD     PROCEDURE  1.  Excision of left nasal tip basal cell carcinoma, 1.3 cm  2.  Adjacent tissue transfer, left Nasal bilobed flap, 2 cm²    PREOPERATIVE DIAGNOSIS:  Nasal cell carcinoma, left nasal tip    POSTOPERATIVE DIAGNOSIS:  Same    ANESTHESIA:  MAC with local    INDICATION FOR PROCEDURE:  This is a 78-year-old female with a basal cell carcinoma on her left nasal tip.  We will proceed with excision with frozen sections followed by reconstruction as planned.    OPERATIVE FINDINGS:  The resulting defect once clear margins was obtained was a proximally 1.3 cm in diameter.  This was reconstructed with use of a bilobed flap based on the left nasal sidewall.    DESCRIPTION OF PROCEDURE:  The lesion was excised circumferentially full-thickness through the dermis with a [address] [place] blade and was elevated in the subcutaneous plane.  The specimen was oriented with a suture at 12:00 and was sent to pathology for frozen sections.  Confirmation of basal cell carcinoma was confirmed, and there were positive margins from 6:00 clockwise to 12:00 on the peripheral margin.  An additional \"C-shaped\" peripheral margin was sent from 6:00 to 12:00, and this was found to have clear new margins.  The resulting defect was then irrigated and hemostased with bipolar cautery.  A bilobed flap was designed on the left nasal sidewall.  This flap was incised and elevated with a [address] [place] blade.  It was transposed into the defect and inset and repair was performed with a combination of deep dermal 5-0 Vicryl and interrupted 5-0 nylon.  The site was dressed with antibiotic ointment, sterile gauze, and silk tape.    BLOOD LOSS:   10 mL    COMPLICATIONS:   None    SPECIMENS:          Specimens       ID Source Type Tests Collected By Collected At [facility]?    A Nose " Tissue TISSUE PATHOLOGY EXAM   Heri Ramirez MD 4/16/24 1226 Yes    Description: LEFT NASAL TIP LESION, STITCH AT 12:00, FROZEN    Comment: LEFT NASAL TIP LESION, STITCH AT 12:00, FROZEN    B Nose Tissue TISSUE PATHOLOGY EXAM   Heri Ramirez MD 4/16/24 1301 Yes    Description: ADDITIONAL 6 O'CLOCK-12 O'CLOCK MARGINS            IMPLANTS:    Nothing was implanted during the procedure    DISPOSITION:   Home and self-care.  Return to clinic in 6 days.    STAFF:  Surgeon(s):  Heri Ramirez MD           Electronically signed by Heri Ramirez MD, 04/16/24, 4:43 PM CDT.

## 2024-04-16 NOTE — DISCHARGE INSTRUCTIONS
General Postoperative Instructions  Heri Ramirez MD      ENCOURAGED ACTIVITY  You should make an effort to spend time on your feet as soon as you safely can.  This may require assistance at first.    Standing and walking will dramatically decrease the risk of blood clots.    You should try to walk for a few minutes every hour during waking hours.  You do not need to wake up through the night to do this.      DRESSINGS AND FOLLOW-UP  Please keep your dressings in place for 24 hours.  Please keep your dressings clean and dry at all times.  After removing your dressings, you do not need to keep either incision covered.  It is ok to resume normal showering in 2 days.  Do not submerge your incisions below water for 2 weeks.  You will have scheduled follow-up in 6 days.    MEDICATIONS  Take all of your medications as instructed.  Please take your prescribed pain medication only as needed.   Pain medication can upset your stomach so be sure to eat something when taking them.   Pain medication can be constipating so be sure to drink plenty of fluids. You should also take an over-the-counter laxative or stool softener until normal bowel movements resume.   Over the counter pain medication is ok to take with or instead of your prescribed pain medication.    Questions or Concerns    If you have additional questions or concerns, please contact [facility] at [phone] during or after office hours. After hours, you will have the option to press #1 to speak to the answering service.  They will be able to put you in touch with Dr. Ramirez if necessary.     In the case of an emergency, please call 911.    Signs and Symptoms of Infection and/or Complication:    Rapid or asymmetric swelling  Swelling that worsens after the first 48 hours  Redness and warmth developing on previously normal-appearing skin  Drainage other than scant blood or blood-tinged clear fluid  Fever or chills  Nausea and  vomiting or diarrhea   Separation of the incision  Bleeding that does not stop with pressure    Signs and Symptoms of a Potential Emergency:    If you experience any of the following during your postoperative recovery, this may represent an emergency.  Please call 911 and seek immediate medical attention:    Loss of consciousness or “blacking out”  Worsening shortness of breath or inability to catch your breath  No onset chest, shoulder, or neck pain  Leg pain or swelling  Light-headedness or dizziness when attempting to stand or walk

## 2024-04-16 NOTE — ANESTHESIA POSTPROCEDURE EVALUATION
Patient: Marily Reese    Procedure Summary       Date: 04/16/24 Room / Location:  [facility]  /  [facility]    Anesthesia Start: 1150 Anesthesia Stop: 1359    Procedure: EXCISION OF LEFT NASAL TIP BASAL CELL CARCINOMA (Left) Diagnosis:     Surgeons: Heri Ramirez MD Provider: Leslie Rivera CRNA    Anesthesia Type: MAC ASA Status: 2            Anesthesia Type: MAC    Vitals  Vitals Value Taken Time   /60 04/16/24 1431   Temp     Pulse 80 04/16/24 1437   Resp 16 04/16/24 1354   SpO2 92 % 04/16/24 1437   Vitals shown include unfiled device data.        Post Anesthesia Care and Evaluation    Patient location during evaluation: PACU  Patient participation: complete - patient participated  Level of consciousness: awake and awake and alert  Pain score: 0  Pain management: adequate    Airway patency: patent  Anesthetic complications: No anesthetic complications  PONV Status: none  Cardiovascular status: acceptable  Respiratory status: acceptable  Hydration status: acceptable    Comments: Patient discharged according to acceptable Lelo score per RN assessment. See nursing records for further information.     Blood pressure 125/60, pulse 73, temperature 97.1 °F (36.2 °C), temperature source Temporal, resp. rate 16, SpO2 92%, not currently breastfeeding.
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
## 2024-04-20 LAB
CYTO UR: NORMAL
LAB AP CASE REPORT: NORMAL
LAB AP CLINICAL INFORMATION: NORMAL
Lab: NORMAL
Lab: NORMAL
PATH REPORT.FINAL DX SPEC: NORMAL
PATH REPORT.GROSS SPEC: NORMAL

## 2024-06-03 ENCOUNTER — HOSPITAL ENCOUNTER (EMERGENCY)
Facility: HOSPITAL | Age: 79
Discharge: HOME | End: 2024-06-04
Admitting: STUDENT IN AN ORGANIZED HEALTH CARE EDUCATION/TRAINING PROGRAM
Payer: MEDICARE

## 2024-06-03 ENCOUNTER — APPOINTMENT (OUTPATIENT)
Dept: GENERAL RADIOLOGY | Facility: HOSPITAL | Age: 79
End: 2024-06-03
Payer: MEDICARE

## 2024-06-03 DIAGNOSIS — R53.83 OTHER FATIGUE: ICD-10-CM

## 2024-06-03 DIAGNOSIS — B34.0 ADENOVIRUS INFECTION: Primary | ICD-10-CM

## 2024-06-03 LAB
ALBUMIN SERPL-MCNC: 4.5 G/DL (ref 3.5–5.2)
ALBUMIN/GLOB SERPL: 1.6 G/DL
ALP SERPL-CCNC: 78 U/L (ref 39–117)
ALT SERPL W P-5'-P-CCNC: 28 U/L (ref 1–33)
ANION GAP SERPL CALCULATED.3IONS-SCNC: 11 MMOL/L (ref 5–15)
AST SERPL-CCNC: 22 U/L (ref 1–32)
B PARAPERT DNA SPEC QL NAA+PROBE: NOT DETECTED
B PERT DNA SPEC QL NAA+PROBE: NOT DETECTED
BACTERIA UR QL AUTO: NORMAL /HPF
BASOPHILS # BLD AUTO: 0.03 10*3/MM3 (ref 0–0.2)
BASOPHILS NFR BLD AUTO: 0.2 % (ref 0–1.5)
BILIRUB SERPL-MCNC: 0.7 MG/DL (ref 0–1.2)
BILIRUB UR QL STRIP: NEGATIVE
BUN SERPL-MCNC: 10 MG/DL (ref 8–23)
BUN/CREAT SERPL: 13 (ref 7–25)
C PNEUM DNA NPH QL NAA+NON-PROBE: NOT DETECTED
CALCIUM SPEC-SCNC: 9.8 MG/DL (ref 8.6–10.5)
CHLORIDE SERPL-SCNC: 104 MMOL/L (ref 98–107)
CK SERPL-CCNC: 100 U/L (ref 20–180)
CLARITY UR: CLEAR
CO2 SERPL-SCNC: 26 MMOL/L (ref 22–29)
COLOR UR: YELLOW
CREAT SERPL-MCNC: 0.77 MG/DL (ref 0.57–1)
D-LACTATE SERPL-SCNC: 1.2 MMOL/L (ref 0.5–2)
DEPRECATED RDW RBC AUTO: 48.6 FL (ref 37–54)
EGFRCR SERPLBLD CKD-EPI 2021: 80.1 ML/MIN/1.73
EOSINOPHIL # BLD AUTO: 0.12 10*3/MM3 (ref 0–0.4)
EOSINOPHIL NFR BLD AUTO: 1 % (ref 0.3–6.2)
ERYTHROCYTE [DISTWIDTH] IN BLOOD BY AUTOMATED COUNT: 14.3 % (ref 12.3–15.4)
FLUAV SUBTYP SPEC NAA+PROBE: NOT DETECTED
FLUBV RNA ISLT QL NAA+PROBE: NOT DETECTED
GLOBULIN UR ELPH-MCNC: 2.8 GM/DL
GLUCOSE SERPL-MCNC: 126 MG/DL (ref 65–99)
GLUCOSE UR STRIP-MCNC: NEGATIVE MG/DL
HADV DNA SPEC NAA+PROBE: DETECTED
HCOV 229E RNA SPEC QL NAA+PROBE: NOT DETECTED
HCOV HKU1 RNA SPEC QL NAA+PROBE: NOT DETECTED
HCOV NL63 RNA SPEC QL NAA+PROBE: NOT DETECTED
HCOV OC43 RNA SPEC QL NAA+PROBE: NOT DETECTED
HCT VFR BLD AUTO: 37.8 % (ref 34–46.6)
HGB BLD-MCNC: 12.4 G/DL (ref 12–15.9)
HGB UR QL STRIP.AUTO: NEGATIVE
HMPV RNA NPH QL NAA+NON-PROBE: NOT DETECTED
HOLD SPECIMEN: NORMAL
HOLD SPECIMEN: NORMAL
HPIV1 RNA ISLT QL NAA+PROBE: NOT DETECTED
HPIV2 RNA SPEC QL NAA+PROBE: NOT DETECTED
HPIV3 RNA NPH QL NAA+PROBE: NOT DETECTED
HPIV4 P GENE NPH QL NAA+PROBE: NOT DETECTED
HYALINE CASTS UR QL AUTO: NORMAL /LPF
IMM GRANULOCYTES # BLD AUTO: 0.05 10*3/MM3 (ref 0–0.05)
IMM GRANULOCYTES NFR BLD AUTO: 0.4 % (ref 0–0.5)
KETONES UR QL STRIP: NEGATIVE
LEUKOCYTE ESTERASE UR QL STRIP.AUTO: ABNORMAL
LIPASE SERPL-CCNC: 51 U/L (ref 13–60)
LYMPHOCYTES # BLD AUTO: 0.96 10*3/MM3 (ref 0.7–3.1)
LYMPHOCYTES NFR BLD AUTO: 7.7 % (ref 19.6–45.3)
M PNEUMO IGG SER IA-ACNC: NOT DETECTED
MAGNESIUM SERPL-MCNC: 1.9 MG/DL (ref 1.6–2.4)
MCH RBC QN AUTO: 30.4 PG (ref 26.6–33)
MCHC RBC AUTO-ENTMCNC: 32.8 G/DL (ref 31.5–35.7)
MCV RBC AUTO: 92.6 FL (ref 79–97)
MONOCYTES # BLD AUTO: 0.73 10*3/MM3 (ref 0.1–0.9)
MONOCYTES NFR BLD AUTO: 5.9 % (ref 5–12)
NEUTROPHILS NFR BLD AUTO: 10.56 10*3/MM3 (ref 1.7–7)
NEUTROPHILS NFR BLD AUTO: 84.8 % (ref 42.7–76)
NITRITE UR QL STRIP: NEGATIVE
NRBC BLD AUTO-RTO: 0 /100 WBC (ref 0–0.2)
NT-PROBNP SERPL-MCNC: 104.6 PG/ML (ref 0–1800)
PH UR STRIP.AUTO: 7 [PH] (ref 5–8)
PLATELET # BLD AUTO: 144 10*3/MM3 (ref 140–450)
PMV BLD AUTO: 11.9 FL (ref 6–12)
POTASSIUM SERPL-SCNC: 4.1 MMOL/L (ref 3.5–5.2)
PROT SERPL-MCNC: 7.3 G/DL (ref 6–8.5)
PROT UR QL STRIP: NEGATIVE
RBC # BLD AUTO: 4.08 10*6/MM3 (ref 3.77–5.28)
RBC # UR STRIP: NORMAL /HPF
REF LAB TEST METHOD: NORMAL
RHINOVIRUS RNA SPEC NAA+PROBE: NOT DETECTED
RSV RNA NPH QL NAA+NON-PROBE: NOT DETECTED
SARS-COV-2 RNA NPH QL NAA+NON-PROBE: NOT DETECTED
SODIUM SERPL-SCNC: 141 MMOL/L (ref 136–145)
SP GR UR STRIP: 1.01 (ref 1–1.03)
SQUAMOUS #/AREA URNS HPF: NORMAL /HPF
TROPONIN T SERPL HS-MCNC: 12 NG/L
TSH SERPL DL<=0.05 MIU/L-ACNC: 1.17 UIU/ML (ref 0.27–4.2)
UROBILINOGEN UR QL STRIP: ABNORMAL
WBC # UR STRIP: NORMAL /HPF
WBC NRBC COR # BLD AUTO: 12.45 10*3/MM3 (ref 3.4–10.8)
WHOLE BLOOD HOLD COAG: NORMAL
WHOLE BLOOD HOLD SPECIMEN: NORMAL

## 2024-06-03 PROCEDURE — 85025 COMPLETE CBC W/AUTO DIFF WBC: CPT | Performed by: STUDENT IN AN ORGANIZED HEALTH CARE EDUCATION/TRAINING PROGRAM

## 2024-06-03 PROCEDURE — 84443 ASSAY THYROID STIM HORMONE: CPT

## 2024-06-03 PROCEDURE — 93010 ELECTROCARDIOGRAM REPORT: CPT | Performed by: INTERNAL MEDICINE

## 2024-06-03 PROCEDURE — 83880 ASSAY OF NATRIURETIC PEPTIDE: CPT | Performed by: STUDENT IN AN ORGANIZED HEALTH CARE EDUCATION/TRAINING PROGRAM

## 2024-06-03 PROCEDURE — 25010000002 ONDANSETRON PER 1 MG

## 2024-06-03 PROCEDURE — 83605 ASSAY OF LACTIC ACID: CPT | Performed by: STUDENT IN AN ORGANIZED HEALTH CARE EDUCATION/TRAINING PROGRAM

## 2024-06-03 PROCEDURE — 93005 ELECTROCARDIOGRAM TRACING: CPT | Performed by: STUDENT IN AN ORGANIZED HEALTH CARE EDUCATION/TRAINING PROGRAM

## 2024-06-03 PROCEDURE — 99284 EMERGENCY DEPT VISIT MOD MDM: CPT

## 2024-06-03 PROCEDURE — 84484 ASSAY OF TROPONIN QUANT: CPT

## 2024-06-03 PROCEDURE — 83735 ASSAY OF MAGNESIUM: CPT | Performed by: STUDENT IN AN ORGANIZED HEALTH CARE EDUCATION/TRAINING PROGRAM

## 2024-06-03 PROCEDURE — 80053 COMPREHEN METABOLIC PANEL: CPT | Performed by: STUDENT IN AN ORGANIZED HEALTH CARE EDUCATION/TRAINING PROGRAM

## 2024-06-03 PROCEDURE — 96374 THER/PROPH/DIAG INJ IV PUSH: CPT

## 2024-06-03 PROCEDURE — 83690 ASSAY OF LIPASE: CPT

## 2024-06-03 PROCEDURE — 71045 X-RAY EXAM CHEST 1 VIEW: CPT

## 2024-06-03 PROCEDURE — 0202U NFCT DS 22 TRGT SARS-COV-2: CPT

## 2024-06-03 PROCEDURE — 82550 ASSAY OF CK (CPK): CPT

## 2024-06-03 PROCEDURE — 36415 COLL VENOUS BLD VENIPUNCTURE: CPT

## 2024-06-03 PROCEDURE — 81001 URINALYSIS AUTO W/SCOPE: CPT | Performed by: STUDENT IN AN ORGANIZED HEALTH CARE EDUCATION/TRAINING PROGRAM

## 2024-06-03 PROCEDURE — 25810000003 SODIUM CHLORIDE 0.9 % SOLUTION

## 2024-06-03 PROCEDURE — P9612 CATHETERIZE FOR URINE SPEC: HCPCS

## 2024-06-03 RX ORDER — SODIUM CHLORIDE 0.9 % (FLUSH) 0.9 %
10 SYRINGE (ML) INJECTION AS NEEDED
Status: DISCONTINUED | OUTPATIENT
Start: 2024-06-03 | End: 2024-06-04 | Stop reason: HOSPADM

## 2024-06-03 RX ORDER — ACETAMINOPHEN 500 MG
1000 TABLET ORAL ONCE
Status: COMPLETED | OUTPATIENT
Start: 2024-06-03 | End: 2024-06-03

## 2024-06-03 RX ORDER — ONDANSETRON 2 MG/ML
4 INJECTION INTRAMUSCULAR; INTRAVENOUS ONCE
Status: COMPLETED | OUTPATIENT
Start: 2024-06-03 | End: 2024-06-03

## 2024-06-03 RX ADMIN — ONDANSETRON 4 MG: 2 INJECTION INTRAMUSCULAR; INTRAVENOUS at 22:39

## 2024-06-03 RX ADMIN — ACETAMINOPHEN 1000 MG: 500 TABLET, FILM COATED ORAL at 22:39

## 2024-06-03 RX ADMIN — SODIUM CHLORIDE 1000 ML: 9 INJECTION, SOLUTION INTRAVENOUS at 22:39

## 2024-06-04 VITALS
BODY MASS INDEX: 21.98 KG/M2 | RESPIRATION RATE: 15 BRPM | DIASTOLIC BLOOD PRESSURE: 53 MMHG | HEART RATE: 78 BPM | WEIGHT: 145 LBS | TEMPERATURE: 98.3 F | SYSTOLIC BLOOD PRESSURE: 105 MMHG | HEIGHT: 68 IN | OXYGEN SATURATION: 93 %

## 2024-06-04 NOTE — DISCHARGE INSTRUCTIONS
Today you were seen in the ED for your symptoms.  We have discussed the results of your labs and imaging at the bedside.  Your respiratory panel is positive for adenovirus.  As this is a viral infection and will need to run its course.  I would recommend rest, increase oral fluids, and Tylenol/Motrin as needed.  Your chest x-ray did not reveal any signs of pneumonia and your other lab workup looked good.  Please touch base with your PCP this week and return to the ED with any new, worsening, or persistent symptoms.
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
## 2024-06-05 LAB
QT INTERVAL: 342 MS
QTC INTERVAL: 418 MS

## 2024-09-12 ENCOUNTER — PRE-ADMISSION TESTING (OUTPATIENT)
Dept: PREADMISSION TESTING | Facility: HOSPITAL | Age: 79
End: 2024-09-12
Payer: MEDICARE

## 2024-09-12 VITALS
RESPIRATION RATE: 18 BRPM | DIASTOLIC BLOOD PRESSURE: 55 MMHG | SYSTOLIC BLOOD PRESSURE: 115 MMHG | OXYGEN SATURATION: 95 % | HEART RATE: 68 BPM | BODY MASS INDEX: 22.98 KG/M2 | WEIGHT: 146.39 LBS | HEIGHT: 67 IN

## 2024-09-12 LAB
ANION GAP SERPL CALCULATED.3IONS-SCNC: 8 MMOL/L (ref 5–15)
BUN SERPL-MCNC: 13 MG/DL (ref 8–23)
BUN/CREAT SERPL: 18.1 (ref 7–25)
CALCIUM SPEC-SCNC: 9.2 MG/DL (ref 8.6–10.5)
CHLORIDE SERPL-SCNC: 107 MMOL/L (ref 98–107)
CO2 SERPL-SCNC: 27 MMOL/L (ref 22–29)
CREAT SERPL-MCNC: 0.72 MG/DL (ref 0.57–1)
DEPRECATED RDW RBC AUTO: 47.6 FL (ref 37–54)
EGFRCR SERPLBLD CKD-EPI 2021: 86.2 ML/MIN/1.73
ERYTHROCYTE [DISTWIDTH] IN BLOOD BY AUTOMATED COUNT: 14.2 % (ref 12.3–15.4)
GLUCOSE SERPL-MCNC: 104 MG/DL (ref 65–99)
HCT VFR BLD AUTO: 35.9 % (ref 34–46.6)
HGB BLD-MCNC: 11.7 G/DL (ref 12–15.9)
MCH RBC QN AUTO: 29.9 PG (ref 26.6–33)
MCHC RBC AUTO-ENTMCNC: 32.6 G/DL (ref 31.5–35.7)
MCV RBC AUTO: 91.8 FL (ref 79–97)
PLATELET # BLD AUTO: 150 10*3/MM3 (ref 140–450)
PMV BLD AUTO: 12 FL (ref 6–12)
POTASSIUM SERPL-SCNC: 4.1 MMOL/L (ref 3.5–5.2)
RBC # BLD AUTO: 3.91 10*6/MM3 (ref 3.77–5.28)
SODIUM SERPL-SCNC: 142 MMOL/L (ref 136–145)
WBC NRBC COR # BLD AUTO: 5.17 10*3/MM3 (ref 3.4–10.8)

## 2024-09-12 PROCEDURE — 87081 CULTURE SCREEN ONLY: CPT

## 2024-09-12 PROCEDURE — 85027 COMPLETE CBC AUTOMATED: CPT

## 2024-09-12 PROCEDURE — 36415 COLL VENOUS BLD VENIPUNCTURE: CPT

## 2024-09-12 PROCEDURE — 80048 BASIC METABOLIC PNL TOTAL CA: CPT

## 2024-09-12 NOTE — DISCHARGE INSTRUCTIONS
Preparing for Surgery  Follow these instructions before the procedure:  Several days or weeks before your procedure        Ask your health care provider about:  Changing or stopping your regular medicines. This is especially important if you are taking diabetes medicines or blood thinners.  Taking medicines such as aspirin and ibuprofen. These medicines can thin your blood. Do not take these medicines unless your health care provider tells you to take them.  Taking over-the-counter medicines, vitamins, herbs, and supplements.    Contact your surgeon if you:  Develop a fever of more than 100.4°F (38°C) or other feelings of illness during the 48 hours before your surgery.  Have symptoms that get worse.  Have questions or concerns about your surgery.  If you are going home the same day of your surgery you will need to arrange for a responsible adult, age 18 years old or older, to drive you home from the hospital and stay with you for 24 hours. Verification of the  will be made prior to any procedure requiring sedation. You may not go home in a taxi or any form of public transportation by yourself.     Day before your procedure      24 hours before your procedure DO NOT drink alcoholic beverages or smoke.  24 hours before your procedure STOP taking Erectile Dysfunction medication (i.e.,Cialis, Viagra)   You may be asked to shower with a germ-killing soap.  Day of your procedure   You may take the following medication(s) the morning of surgery with a sip of water: OMEPRAZOLE      8 hours before your scheduled arrival time, STOP all food, any dairy products, and full liquids. This includes hard candy, chewing gum or mints. This is extremely important to prevent serious complications.     Up to 2 hours before your scheduled arrival time, you may have clear liquids no cream, powder, or pulp of any kind. Safe options are water, black coffee, plain tea, soda, Gatorade/Powerade, clear broth, apple juice.    2 hours  before your scheduled arrival time, STOP drinking clear liquids.    You may need to take another shower with a germ-killing soap before you leave home in the morning. Do not use perfumes, colognes, or body lotions.  Wear comfortable loose-fitting clothing.  Remove all jewelry including body piercing and rings, dark colored nail polish, and make up prior to arrival at the hospital. Leave all valuables at home.   Bring your hearing aids if you rely on them.  Do not wear contact lenses. If you wear eyeglasses remember to bring a case to store them in while you are in surgery.  Do not use denture adhesives since you will be asked to remove them during your surgery.    You do not need to bring your home medications into the hospital.   Bring your sleep apnea device with you on the day of your surgery (if this applies to you).  If you wear portable oxygen, bring it with you.   If you are staying overnight, you may bring a bag of items you may need such as slippers, robe and a change of clothes for your discharge. You may want to leave these items in the car until you are ready for them since your family will take your belongings when you leave the pre-operative area.  Arrive at the hospital as scheduled by the office. You will be asked to arrive 2 hours prior to your surgery time in order to prepare for your procedure.  When you arrive at the hospital  Go to the registration desk located at the main entrance of the hospital.  After registration is completed, you will be given a beeper and a sticker sheet. Take the stickers to Outpatient Surgery and place in the tray at the end of the desk to notify the staff that you have arrived and registered.   Return to the lobby to wait. You are not always called back according to the time of arrival but rather the time your doctor will be ready.  When your beeper lights up and vibrates proceed through the double doors, under the stairs, and a member of the Outpatient Surgery staff  will escort you to your preoperative room.   How to Use Chlorhexidine Before Surgery  Chlorhexidine gluconate (CHG) is a germ-killing (antiseptic) solution that is used to clean the skin. It can get rid of the bacteria that normally live on the skin and can keep them away for about 24 hours. To clean your skin with CHG, you may be given:  A CHG solution to use in the shower or as part of a sponge bath.  A prepackaged cloth that contains CHG.  Cleaning your skin with CHG may help lower the risk for infection:  While you are staying in the intensive care unit of the hospital.  If you have a vascular access, such as a central line, to provide short-term or long-term access to your veins.  If you have a catheter to drain urine from your bladder.  If you are on a ventilator. A ventilator is a machine that helps you breathe by moving air in and out of your lungs.  After surgery.  What are the risks?  Risks of using CHG include:  A skin reaction.  Hearing loss, if CHG gets in your ears and you have a perforated eardrum.  Eye injury, if CHG gets in your eyes and is not rinsed out.  The CHG product catching fire.  Make sure that you avoid smoking and flames after applying CHG to your skin.  Do not use CHG:  If you have a chlorhexidine allergy or have previously reacted to chlorhexidine.  On babies younger than 2 months of age.  How to use CHG solution  Use CHG only as told by your health care provider, and follow the instructions on the label.  Use the full amount of CHG as directed. Usually, this is one bottle.  During a shower    Follow these steps when using CHG solution during a shower (unless your health care provider gives you different instructions):  Start the shower.  Use your normal soap and shampoo to wash your face and hair.  Turn off the shower or move out of the shower stream.  Pour the CHG onto a clean washcloth. Do not use any type of brush or rough-edged sponge.  Starting at your neck, lather your body down  to your toes. Make sure you follow these instructions:  If you will be having surgery, pay special attention to the part of your body where you will be having surgery. Scrub this area for at least 1 minute.  Do not use CHG on your head or face. If the solution gets into your ears or eyes, rinse them well with water.  Avoid your genital area.  Avoid any areas of skin that have broken skin, cuts, or scrapes.  Scrub your back and under your arms. Make sure to wash skin folds.  Let the lather sit on your skin for 1-2 minutes or as long as told by your health care provider.  Thoroughly rinse your entire body in the shower. Make sure that all body creases and crevices are rinsed well.  Dry off with a clean towel. Do not put any substances on your body afterward--such as powder, lotion, or perfume--unless you are told to do so by your health care provider. Only use lotions that are recommended by the .  Put on clean clothes or pajamas.  If it is the night before your surgery, sleep in clean sheets.     During a sponge bath  Follow these steps when using CHG solution during a sponge bath (unless your health care provider gives you different instructions):  Use your normal soap and shampoo to wash your face and hair.  Pour the CHG onto a clean washcloth.  Starting at your neck, lather your body down to your toes. Make sure you follow these instructions:  If you will be having surgery, pay special attention to the part of your body where you will be having surgery. Scrub this area for at least 1 minute.  Do not use CHG on your head or face. If the solution gets into your ears or eyes, rinse them well with water.  Avoid your genital area.  Avoid any areas of skin that have broken skin, cuts, or scrapes.  Scrub your back and under your arms. Make sure to wash skin folds.  Let the lather sit on your skin for 1-2 minutes or as long as told by your health care provider.  Using a different clean, wet washcloth,  thoroughly rinse your entire body. Make sure that all body creases and crevices are rinsed well.  Dry off with a clean towel. Do not put any substances on your body afterward--such as powder, lotion, or perfume--unless you are told to do so by your health care provider. Only use lotions that are recommended by the .  Put on clean clothes or pajamas.  If it is the night before your surgery, sleep in clean sheets.  How to use CHG prepackaged cloths  Only use CHG cloths as told by your health care provider, and follow the instructions on the label.  Use the CHG cloth on clean, dry skin.  Do not use the CHG cloth on your head or face unless your health care provider tells you to.  When washing with the CHG cloth:  Avoid your genital area.  Avoid any areas of skin that have broken skin, cuts, or scrapes.  Before surgery    Follow these steps when using a CHG cloth to clean before surgery (unless your health care provider gives you different instructions):  Using the CHG cloth, vigorously scrub the part of your body where you will be having surgery. Scrub using a back-and-forth motion for 3 minutes. The area on your body should be completely wet with CHG when you are done scrubbing.  Do not rinse. Discard the cloth and let the area air-dry. Do not put any substances on the area afterward, such as powder, lotion, or perfume.  Put on clean clothes or pajamas.  If it is the night before your surgery, sleep in clean sheets.     For general bathing  Follow these steps when using CHG cloths for general bathing (unless your health care provider gives you different instructions).  Use a separate CHG cloth for each area of your body. Make sure you wash between any folds of skin and between your fingers and toes. Wash your body in the following order, switching to a new cloth after each step:  The front of your neck, shoulders, and chest.  Both of your arms, under your arms, and your hands.  Your stomach and groin area,  avoiding the genitals.  Your right leg and foot.  Your left leg and foot.  The back of your neck, your back, and your buttocks.  Do not rinse. Discard the cloth and let the area air-dry. Do not put any substances on your body afterward--such as powder, lotion, or perfume--unless you are told to do so by your health care provider. Only use lotions that are recommended by the .  Put on clean clothes or pajamas.  Contact a health care provider if:  Your skin gets irritated after scrubbing.  You have questions about using your solution or cloth.  You swallow any chlorhexidine. Call your local poison control center (1-810.723.8020 in the [place]  Get help right away if:  Your eyes itch badly, or they become very red or swollen.  Your skin itches badly and is red or swollen.  Your hearing changes.  You have trouble seeing.  You have swelling or tingling in your mouth or throat.  You have trouble breathing.  These symptoms may represent a serious problem that is an emergency. Do not wait to see if the symptoms will go away. Get medical help right away. Call your local emergency services (935 in the [place] Do not drive yourself to the hospital.  Summary  Chlorhexidine gluconate (CHG) is a germ-killing (antiseptic) solution that is used to clean the skin. Cleaning your skin with CHG may help to lower your risk for infection.  You may be given CHG to use for bathing. It may be in a bottle or in a prepackaged cloth to use on your skin. Carefully follow your health care provider's instructions and the instructions on the product label.  Do not use CHG if you have a chlorhexidine allergy.  Contact your health care provider if your skin gets irritated after scrubbing.  This information is not intended to replace advice given to you by your health care provider. Make sure you discuss any questions you have with your health care provider.  Document Revised: 04/17/2023 Document Reviewed: 02/28/2022  Elsevier Patient  Education © 2023 [employer]

## 2024-09-13 LAB — MRSA SPEC QL CULT: NORMAL

## 2024-09-16 ENCOUNTER — HOSPITAL ENCOUNTER (OUTPATIENT)
Facility: HOSPITAL | Age: 79
Setting detail: HOSPITAL OUTPATIENT SURGERY
Discharge: HOME | End: 2024-09-16
Attending: ORTHOPAEDIC SURGERY | Admitting: ORTHOPAEDIC SURGERY
Payer: MEDICARE

## 2024-09-16 ENCOUNTER — APPOINTMENT (OUTPATIENT)
Dept: GENERAL RADIOLOGY | Facility: HOSPITAL | Age: 79
End: 2024-09-16
Payer: MEDICARE

## 2024-09-16 ENCOUNTER — ANESTHESIA EVENT (OUTPATIENT)
Dept: PERIOP | Facility: HOSPITAL | Age: 79
End: 2024-09-16
Payer: MEDICARE

## 2024-09-16 ENCOUNTER — ANESTHESIA (OUTPATIENT)
Dept: PERIOP | Facility: HOSPITAL | Age: 79
End: 2024-09-16
Payer: MEDICARE

## 2024-09-16 VITALS
RESPIRATION RATE: 16 BRPM | SYSTOLIC BLOOD PRESSURE: 134 MMHG | OXYGEN SATURATION: 96 % | DIASTOLIC BLOOD PRESSURE: 66 MMHG | TEMPERATURE: 96.9 F | HEART RATE: 56 BPM

## 2024-09-16 DIAGNOSIS — M17.12 PRIMARY OSTEOARTHRITIS OF LEFT KNEE: Primary | ICD-10-CM

## 2024-09-16 PROCEDURE — 25010000002 BUPIVACAINE (PF) 0.25 % SOLUTION: Performed by: ORTHOPAEDIC SURGERY

## 2024-09-16 PROCEDURE — 25010000002 VANCOMYCIN 1 G RECONSTITUTED SOLUTION: Performed by: ORTHOPAEDIC SURGERY

## 2024-09-16 PROCEDURE — 25010000002 PROPOFOL 10 MG/ML EMULSION: Performed by: NURSE ANESTHETIST, CERTIFIED REGISTERED

## 2024-09-16 PROCEDURE — C1776 JOINT DEVICE (IMPLANTABLE): HCPCS | Performed by: ORTHOPAEDIC SURGERY

## 2024-09-16 PROCEDURE — 25010000002 BUPIVACAINE-MELOXICAM ER 400-12 MG/14ML SOLUTION: Performed by: ORTHOPAEDIC SURGERY

## 2024-09-16 PROCEDURE — 25010000002 FENTANYL CITRATE (PF) 50 MCG/ML SOLUTION: Performed by: ANESTHESIOLOGY

## 2024-09-16 PROCEDURE — 25010000002 MIDAZOLAM PER 1MG: Performed by: ANESTHESIOLOGY

## 2024-09-16 PROCEDURE — 25010000002 LABETALOL 5 MG/ML SOLUTION: Performed by: NURSE ANESTHETIST, CERTIFIED REGISTERED

## 2024-09-16 PROCEDURE — 25810000003 LACTATED RINGERS PER 1000 ML: Performed by: ORTHOPAEDIC SURGERY

## 2024-09-16 PROCEDURE — 73560 X-RAY EXAM OF KNEE 1 OR 2: CPT

## 2024-09-16 PROCEDURE — 25810000003 SODIUM CHLORIDE PER 500 ML: Performed by: ORTHOPAEDIC SURGERY

## 2024-09-16 PROCEDURE — C1713 ANCHOR/SCREW BN/BN,TIS/BN: HCPCS | Performed by: ORTHOPAEDIC SURGERY

## 2024-09-16 PROCEDURE — 25010000002 ROPIVACAINE PER 1 MG: Performed by: ANESTHESIOLOGY

## 2024-09-16 PROCEDURE — 25010000002 GLYCOPYRROLATE 0.4 MG/2ML SOLUTION: Performed by: NURSE ANESTHETIST, CERTIFIED REGISTERED

## 2024-09-16 PROCEDURE — C9088 BUPIVACAINE-MELOXICAM ER 400-12 MG/14ML SOLUTION: HCPCS | Performed by: ORTHOPAEDIC SURGERY

## 2024-09-16 PROCEDURE — 25010000002 CEFAZOLIN PER 500 MG: Performed by: ORTHOPAEDIC SURGERY

## 2024-09-16 PROCEDURE — 25010000002 FENTANYL CITRATE (PF) 100 MCG/2ML SOLUTION: Performed by: NURSE ANESTHETIST, CERTIFIED REGISTERED

## 2024-09-16 DEVICE — COMP FEM ITOTALIDENTITY CR CMT COCR RT: Type: IMPLANTABLE DEVICE | Site: KNEE | Status: FUNCTIONAL

## 2024-09-16 DEVICE — INSRT TIB ITOTALIDENTITY CR LAT A IPOLY/XE: Type: IMPLANTABLE DEVICE | Site: KNEE | Status: FUNCTIONAL

## 2024-09-16 DEVICE — STEM TIB ITOTALIDENTITY CR 12X20MM: Type: IMPLANTABLE DEVICE | Site: KNEE | Status: FUNCTIONAL

## 2024-09-16 DEVICE — HEMOST ABS SURGICEL PWDR 3GM: Type: IMPLANTABLE DEVICE | Site: KNEE | Status: FUNCTIONAL

## 2024-09-16 DEVICE — TRY TIB ITOTALIDENTITY CR CMT TI RT: Type: IMPLANTABLE DEVICE | Site: KNEE | Status: FUNCTIONAL

## 2024-09-16 DEVICE — CMT BONE R 1X40: Type: IMPLANTABLE DEVICE | Site: KNEE | Status: FUNCTIONAL

## 2024-09-16 DEVICE — INSRT TIB ITOTALIDENTITY CR MED IPOLY/XE 6MM RT: Type: IMPLANTABLE DEVICE | Site: KNEE | Status: FUNCTIONAL

## 2024-09-16 DEVICE — TOTAL KN CONFORMIS PT/SPECIF: Type: IMPLANTABLE DEVICE | Site: KNEE | Status: FUNCTIONAL

## 2024-09-16 DEVICE — IMPLANTABLE DEVICE: Type: IMPLANTABLE DEVICE | Site: KNEE | Status: FUNCTIONAL

## 2024-09-16 RX ORDER — SODIUM CHLORIDE, SODIUM LACTATE, POTASSIUM CHLORIDE, CALCIUM CHLORIDE 600; 310; 30; 20 MG/100ML; MG/100ML; MG/100ML; MG/100ML
100 INJECTION, SOLUTION INTRAVENOUS CONTINUOUS
Status: DISCONTINUED | OUTPATIENT
Start: 2024-09-16 | End: 2024-09-16 | Stop reason: HOSPADM

## 2024-09-16 RX ORDER — DROPERIDOL 2.5 MG/ML
0.62 INJECTION, SOLUTION INTRAMUSCULAR; INTRAVENOUS ONCE AS NEEDED
Status: DISCONTINUED | OUTPATIENT
Start: 2024-09-16 | End: 2024-09-16 | Stop reason: HOSPADM

## 2024-09-16 RX ORDER — VANCOMYCIN HYDROCHLORIDE 1 G/20ML
INJECTION, POWDER, LYOPHILIZED, FOR SOLUTION INTRAVENOUS AS NEEDED
Status: DISCONTINUED | OUTPATIENT
Start: 2024-09-16 | End: 2024-09-16 | Stop reason: HOSPADM

## 2024-09-16 RX ORDER — FENTANYL CITRATE 50 UG/ML
INJECTION, SOLUTION INTRAMUSCULAR; INTRAVENOUS AS NEEDED
Status: DISCONTINUED | OUTPATIENT
Start: 2024-09-16 | End: 2024-09-16 | Stop reason: SURG

## 2024-09-16 RX ORDER — HYDROMORPHONE HYDROCHLORIDE 1 MG/ML
0.5 INJECTION, SOLUTION INTRAMUSCULAR; INTRAVENOUS; SUBCUTANEOUS
Status: DISCONTINUED | OUTPATIENT
Start: 2024-09-16 | End: 2024-09-16 | Stop reason: HOSPADM

## 2024-09-16 RX ORDER — LIDOCAINE HYDROCHLORIDE 10 MG/ML
0.5 INJECTION, SOLUTION EPIDURAL; INFILTRATION; INTRACAUDAL; PERINEURAL ONCE AS NEEDED
Status: DISCONTINUED | OUTPATIENT
Start: 2024-09-16 | End: 2024-09-16 | Stop reason: HOSPADM

## 2024-09-16 RX ORDER — LABETALOL HYDROCHLORIDE 5 MG/ML
INJECTION, SOLUTION INTRAVENOUS AS NEEDED
Status: DISCONTINUED | OUTPATIENT
Start: 2024-09-16 | End: 2024-09-16 | Stop reason: SURG

## 2024-09-16 RX ORDER — FENTANYL CITRATE 50 UG/ML
50 INJECTION, SOLUTION INTRAMUSCULAR; INTRAVENOUS ONCE
Status: COMPLETED | OUTPATIENT
Start: 2024-09-16 | End: 2024-09-16

## 2024-09-16 RX ORDER — ASPIRIN 325 MG
325 TABLET, DELAYED RELEASE (ENTERIC COATED) ORAL 2 TIMES DAILY
Qty: 84 TABLET | Refills: 0 | Status: SHIPPED | OUTPATIENT
Start: 2024-09-16 | End: 2024-10-28

## 2024-09-16 RX ORDER — NALOXONE HCL 0.4 MG/ML
0.04 VIAL (ML) INJECTION AS NEEDED
Status: DISCONTINUED | OUTPATIENT
Start: 2024-09-16 | End: 2024-09-16 | Stop reason: HOSPADM

## 2024-09-16 RX ORDER — SODIUM CHLORIDE 9 MG/ML
INJECTION, SOLUTION INTRAVENOUS AS NEEDED
Status: DISCONTINUED | OUTPATIENT
Start: 2024-09-16 | End: 2024-09-16 | Stop reason: HOSPADM

## 2024-09-16 RX ORDER — HYDROCODONE BITARTRATE AND ACETAMINOPHEN 7.5; 325 MG/1; MG/1
1 TABLET ORAL EVERY 4 HOURS PRN
Qty: 42 TABLET | Refills: 0 | Status: SHIPPED | OUTPATIENT
Start: 2024-09-16

## 2024-09-16 RX ORDER — SODIUM CHLORIDE 0.9 % (FLUSH) 0.9 %
3 SYRINGE (ML) INJECTION EVERY 12 HOURS SCHEDULED
Status: DISCONTINUED | OUTPATIENT
Start: 2024-09-16 | End: 2024-09-16 | Stop reason: HOSPADM

## 2024-09-16 RX ORDER — LIDOCAINE HYDROCHLORIDE 20 MG/ML
INJECTION, SOLUTION EPIDURAL; INFILTRATION; INTRACAUDAL; PERINEURAL AS NEEDED
Status: DISCONTINUED | OUTPATIENT
Start: 2024-09-16 | End: 2024-09-16 | Stop reason: SURG

## 2024-09-16 RX ORDER — ONDANSETRON 2 MG/ML
4 INJECTION INTRAMUSCULAR; INTRAVENOUS
Status: DISCONTINUED | OUTPATIENT
Start: 2024-09-16 | End: 2024-09-16 | Stop reason: HOSPADM

## 2024-09-16 RX ORDER — DOCUSATE SODIUM 100 MG/1
100 CAPSULE, LIQUID FILLED ORAL 2 TIMES DAILY
Qty: 60 CAPSULE | Refills: 1 | Status: SHIPPED | OUTPATIENT
Start: 2024-09-16

## 2024-09-16 RX ORDER — SODIUM CHLORIDE 0.9 % (FLUSH) 0.9 %
3 SYRINGE (ML) INJECTION AS NEEDED
Status: DISCONTINUED | OUTPATIENT
Start: 2024-09-16 | End: 2024-09-16 | Stop reason: HOSPADM

## 2024-09-16 RX ORDER — LABETALOL HYDROCHLORIDE 5 MG/ML
5 INJECTION, SOLUTION INTRAVENOUS
Status: DISCONTINUED | OUTPATIENT
Start: 2024-09-16 | End: 2024-09-16 | Stop reason: HOSPADM

## 2024-09-16 RX ORDER — SODIUM CHLORIDE, SODIUM LACTATE, POTASSIUM CHLORIDE, CALCIUM CHLORIDE 600; 310; 30; 20 MG/100ML; MG/100ML; MG/100ML; MG/100ML
1000 INJECTION, SOLUTION INTRAVENOUS CONTINUOUS
Status: DISCONTINUED | OUTPATIENT
Start: 2024-09-16 | End: 2024-09-16 | Stop reason: HOSPADM

## 2024-09-16 RX ORDER — TRANEXAMIC ACID 100 MG/ML
INJECTION, SOLUTION INTRAVENOUS AS NEEDED
Status: DISCONTINUED | OUTPATIENT
Start: 2024-09-16 | End: 2024-09-16 | Stop reason: SURG

## 2024-09-16 RX ORDER — ROPIVACAINE HYDROCHLORIDE 5 MG/ML
INJECTION, SOLUTION EPIDURAL; INFILTRATION; PERINEURAL
Status: COMPLETED | OUTPATIENT
Start: 2024-09-16 | End: 2024-09-16

## 2024-09-16 RX ORDER — FENTANYL CITRATE 50 UG/ML
50 INJECTION, SOLUTION INTRAMUSCULAR; INTRAVENOUS
Status: DISCONTINUED | OUTPATIENT
Start: 2024-09-16 | End: 2024-09-16 | Stop reason: HOSPADM

## 2024-09-16 RX ORDER — SODIUM CHLORIDE 9 MG/ML
40 INJECTION, SOLUTION INTRAVENOUS AS NEEDED
Status: DISCONTINUED | OUTPATIENT
Start: 2024-09-16 | End: 2024-09-16 | Stop reason: HOSPADM

## 2024-09-16 RX ORDER — ROCURONIUM BROMIDE 10 MG/ML
INJECTION, SOLUTION INTRAVENOUS AS NEEDED
Status: DISCONTINUED | OUTPATIENT
Start: 2024-09-16 | End: 2024-09-16 | Stop reason: SURG

## 2024-09-16 RX ORDER — BUPIVACAINE HYDROCHLORIDE 2.5 MG/ML
INJECTION, SOLUTION EPIDURAL; INFILTRATION; INTRACAUDAL AS NEEDED
Status: DISCONTINUED | OUTPATIENT
Start: 2024-09-16 | End: 2024-09-16 | Stop reason: HOSPADM

## 2024-09-16 RX ORDER — MAGNESIUM HYDROXIDE 1200 MG/15ML
LIQUID ORAL AS NEEDED
Status: DISCONTINUED | OUTPATIENT
Start: 2024-09-16 | End: 2024-09-16 | Stop reason: HOSPADM

## 2024-09-16 RX ORDER — SODIUM CHLORIDE 0.9 % (FLUSH) 0.9 %
3-10 SYRINGE (ML) INJECTION AS NEEDED
Status: DISCONTINUED | OUTPATIENT
Start: 2024-09-16 | End: 2024-09-16 | Stop reason: HOSPADM

## 2024-09-16 RX ORDER — FLUMAZENIL 0.1 MG/ML
0.2 INJECTION INTRAVENOUS AS NEEDED
Status: DISCONTINUED | OUTPATIENT
Start: 2024-09-16 | End: 2024-09-16 | Stop reason: HOSPADM

## 2024-09-16 RX ORDER — CYCLOBENZAPRINE HCL 10 MG
10 TABLET ORAL 3 TIMES DAILY PRN
Qty: 30 TABLET | Refills: 0 | Status: SHIPPED | OUTPATIENT
Start: 2024-09-16

## 2024-09-16 RX ORDER — MIDAZOLAM HYDROCHLORIDE 2 MG/2ML
1 INJECTION, SOLUTION INTRAMUSCULAR; INTRAVENOUS ONCE
Status: COMPLETED | OUTPATIENT
Start: 2024-09-16 | End: 2024-09-16

## 2024-09-16 RX ORDER — OXYCODONE AND ACETAMINOPHEN 10; 325 MG/1; MG/1
1 TABLET ORAL EVERY 4 HOURS PRN
Status: DISCONTINUED | OUTPATIENT
Start: 2024-09-16 | End: 2024-09-16 | Stop reason: HOSPADM

## 2024-09-16 RX ORDER — NEOSTIGMINE METHYLSULFATE 5 MG/5 ML
SYRINGE (ML) INTRAVENOUS AS NEEDED
Status: DISCONTINUED | OUTPATIENT
Start: 2024-09-16 | End: 2024-09-16 | Stop reason: SURG

## 2024-09-16 RX ORDER — PROPOFOL 10 MG/ML
VIAL (ML) INTRAVENOUS AS NEEDED
Status: DISCONTINUED | OUTPATIENT
Start: 2024-09-16 | End: 2024-09-16 | Stop reason: SURG

## 2024-09-16 RX ORDER — EPHEDRINE SULFATE 50 MG/ML
INJECTION INTRAVENOUS AS NEEDED
Status: DISCONTINUED | OUTPATIENT
Start: 2024-09-16 | End: 2024-09-16 | Stop reason: SURG

## 2024-09-16 RX ORDER — ACETAMINOPHEN 500 MG
1000 TABLET ORAL ONCE
Status: COMPLETED | OUTPATIENT
Start: 2024-09-16 | End: 2024-09-16

## 2024-09-16 RX ORDER — ONDANSETRON 4 MG/1
4 TABLET, FILM COATED ORAL EVERY 8 HOURS PRN
Qty: 20 TABLET | Refills: 1 | Status: SHIPPED | OUTPATIENT
Start: 2024-09-16

## 2024-09-16 RX ADMIN — FENTANYL CITRATE 50 MCG: 50 INJECTION, SOLUTION INTRAMUSCULAR; INTRAVENOUS at 11:51

## 2024-09-16 RX ADMIN — OXYCODONE AND ACETAMINOPHEN 1 TABLET: 325; 10 TABLET ORAL at 15:53

## 2024-09-16 RX ADMIN — ROPIVACAINE HYDROCHLORIDE 20 ML: 5 INJECTION, SOLUTION EPIDURAL; INFILTRATION; PERINEURAL at 11:54

## 2024-09-16 RX ADMIN — EPHEDRINE SULFATE 10 MG: 50 INJECTION INTRAVENOUS at 15:00

## 2024-09-16 RX ADMIN — SODIUM CHLORIDE 1000 MG: 900 INJECTION INTRAVENOUS at 13:27

## 2024-09-16 RX ADMIN — EPHEDRINE SULFATE 10 MG: 50 INJECTION INTRAVENOUS at 13:38

## 2024-09-16 RX ADMIN — Medication 4 MG: at 14:49

## 2024-09-16 RX ADMIN — LABETALOL HYDROCHLORIDE 10 MG: 5 INJECTION INTRAVENOUS at 14:00

## 2024-09-16 RX ADMIN — TRANEXAMIC ACID 1000 MG: 100 INJECTION, SOLUTION INTRAVENOUS at 14:43

## 2024-09-16 RX ADMIN — EPHEDRINE SULFATE 10 MG: 50 INJECTION INTRAVENOUS at 14:43

## 2024-09-16 RX ADMIN — LIDOCAINE HYDROCHLORIDE 40 MG: 20 INJECTION, SOLUTION EPIDURAL; INFILTRATION; INTRACAUDAL; PERINEURAL at 13:27

## 2024-09-16 RX ADMIN — TRANEXAMIC ACID 1000 MG: 100 INJECTION, SOLUTION INTRAVENOUS at 13:33

## 2024-09-16 RX ADMIN — GLYCOPYRROLATE 0.4 MG: 0.2 INJECTION INTRAMUSCULAR; INTRAVENOUS at 14:49

## 2024-09-16 RX ADMIN — ACETAMINOPHEN 1000 MG: 500 TABLET, FILM COATED ORAL at 11:56

## 2024-09-16 RX ADMIN — SODIUM CHLORIDE, POTASSIUM CHLORIDE, SODIUM LACTATE AND CALCIUM CHLORIDE: 600; 310; 30; 20 INJECTION, SOLUTION INTRAVENOUS at 13:56

## 2024-09-16 RX ADMIN — ROCURONIUM 50 MG: 50 INJECTION, SOLUTION INTRAVENOUS at 13:27

## 2024-09-16 RX ADMIN — PROPOFOL 50 MG: 10 INJECTION, EMULSION INTRAVENOUS at 13:48

## 2024-09-16 RX ADMIN — LABETALOL HYDROCHLORIDE 10 MG: 5 INJECTION INTRAVENOUS at 14:11

## 2024-09-16 RX ADMIN — EPHEDRINE SULFATE 10 MG: 50 INJECTION INTRAVENOUS at 14:55

## 2024-09-16 RX ADMIN — GLYCOPYRROLATE 0.2 MG: 0.2 INJECTION INTRAMUSCULAR; INTRAVENOUS at 13:33

## 2024-09-16 RX ADMIN — MIDAZOLAM HYDROCHLORIDE 1 MG: 1 INJECTION, SOLUTION INTRAMUSCULAR; INTRAVENOUS at 11:51

## 2024-09-16 RX ADMIN — EPHEDRINE SULFATE 10 MG: 50 INJECTION INTRAVENOUS at 14:31

## 2024-09-16 RX ADMIN — FENTANYL CITRATE 100 MCG: 50 INJECTION, SOLUTION INTRAMUSCULAR; INTRAVENOUS at 13:24

## 2024-09-16 RX ADMIN — SODIUM CHLORIDE, POTASSIUM CHLORIDE, SODIUM LACTATE AND CALCIUM CHLORIDE 1000 ML: 600; 310; 30; 20 INJECTION, SOLUTION INTRAVENOUS at 09:44

## 2024-09-16 RX ADMIN — PROPOFOL 150 MG: 10 INJECTION, EMULSION INTRAVENOUS at 13:27

## 2024-09-16 RX ADMIN — PROPOFOL 50 MG: 10 INJECTION, EMULSION INTRAVENOUS at 13:39

## 2024-10-15 ENCOUNTER — TELEPHONE (OUTPATIENT)
Age: 79
End: 2024-10-15

## 2024-10-15 NOTE — TELEPHONE ENCOUNTER
Stephenie with [facility] is needing an order dated for 10/14 sent in for this patient.    Fax number: [phone]  Phone Number: [phone]

## 2024-10-22 PROBLEM — Z96.652 STATUS POST TOTAL KNEE REPLACEMENT, LEFT: Status: ACTIVE | Noted: 2024-10-22

## 2024-10-22 ASSESSMENT — ENCOUNTER SYMPTOMS
COLOR CHANGE: 0
BACK PAIN: 0

## 2024-10-22 NOTE — PROGRESS NOTES
[facility]  [facility]  [address] [place] [address]  Dept: [phone]  Dept Fax: [phone]  Loc: [phone]    Ashley Crisostomo is a 79 y.o. female who presents today for her medical conditions/complaints as noted below.  Ashley Crisostomo is complaining of Lt TKR 9/16        HPI:   Patient is a pleasant 79-year-old female presenting to clinic today 6 weeks out from her left total knee replacement.  She states that she is continuing to do very well.        Past Medical History:   Diagnosis Date    Constipation     Hypothyroidism        Past Surgical History:   Procedure Laterality Date    HIP SURGERY Right     TOTAL HIP ARTHROPLASTY Right 11/19/2023    HIP TOTAL ARTHROPLASTY ANTERIOR APPROACH performed by Caden Sosa MD at [facility] OR       History reviewed. No pertinent family history.    Social History     Tobacco Use    Smoking status: Never    Smokeless tobacco: Never   Substance Use Topics    Alcohol use: Never        Current Outpatient Medications   Medication Sig Dispense Refill    Aspirin-Calcium Carbonate  MG TABS       aspirin 81 MG EC tablet 1 tablet      B Complex CAPS Take by mouth daily      Calcium Citrate-Vitamin D3 315-6.25 MG-MCG TABS Take 2 tablets by mouth 2 times daily      diclofenac (VOLTAREN) 75 MG EC tablet       omeprazole (PRILOSEC) 20 MG delayed release capsule Take 1 capsule by mouth 2 times daily      rosuvastatin (CRESTOR) 10 MG tablet       valACYclovir (VALTREX) 1 g tablet Take 1 tablet by mouth 2 times daily as needed      Zinc Sulfate 66 MG TABS Take 1 tablet by mouth daily      Probiotic Product (PROBIOTIC DAILY PO) Take by mouth      NONFORMULARY Diclofenac sodium 75 mg      Calcium Carbonate-Vit D-Min (CALCIUM 600+D PLUS MINERALS PO) Take by mouth      UNABLE TO FIND Omega3      zinc gluconate 50 MG tablet Take 1 tablet by mouth daily      UNABLE TO FIND [id number]      UNABLE TO FIND Vitamin D3

## 2024-10-23 ENCOUNTER — OFFICE VISIT (OUTPATIENT)
Age: 79
End: 2024-10-23

## 2024-10-23 ENCOUNTER — TELEPHONE (OUTPATIENT)
Age: 79
End: 2024-10-23

## 2024-10-23 VITALS — WEIGHT: 140 LBS | BODY MASS INDEX: 21.22 KG/M2 | HEIGHT: 68 IN

## 2024-10-23 DIAGNOSIS — Z96.652 STATUS POST TOTAL KNEE REPLACEMENT, LEFT: Primary | ICD-10-CM

## 2024-10-23 PROBLEM — K92.1 MELENA: Status: ACTIVE | Noted: 2017-09-20

## 2024-10-23 PROBLEM — G47.33 OSA (OBSTRUCTIVE SLEEP APNEA): Status: ACTIVE | Noted: 2022-09-16

## 2024-10-23 PROBLEM — M17.12 PRIMARY OSTEOARTHRITIS OF LEFT KNEE: Status: ACTIVE | Noted: 2024-09-16

## 2024-10-23 PROBLEM — I87.2 VENOUS INSUFFICIENCY: Status: ACTIVE | Noted: 2018-08-09

## 2024-10-23 PROBLEM — M76.821 POSTERIOR TIBIAL TENDON DYSFUNCTION, RIGHT: Status: ACTIVE | Noted: 2023-06-27

## 2024-10-23 PROBLEM — M21.371 FOOT DROP, RIGHT: Status: ACTIVE | Noted: 2018-07-02

## 2024-10-23 PROBLEM — R06.83 SNORING: Status: ACTIVE | Noted: 2022-09-16

## 2024-10-23 PROBLEM — R25.2 LEG CRAMPS: Status: ACTIVE | Noted: 2024-10-23

## 2024-10-23 PROCEDURE — 99024 POSTOP FOLLOW-UP VISIT: CPT | Performed by: PHYSICIAN ASSISTANT

## 2024-10-23 RX ORDER — DICLOFENAC SODIUM 75 MG/1
TABLET, DELAYED RELEASE ORAL
COMMUNITY

## 2024-10-23 RX ORDER — ROSUVASTATIN CALCIUM 10 MG/1
TABLET, COATED ORAL
COMMUNITY
Start: 2024-09-09

## 2024-10-23 RX ORDER — VITAMIN B COMPLEX
CAPSULE ORAL DAILY
COMMUNITY

## 2024-10-23 RX ORDER — VALACYCLOVIR HYDROCHLORIDE 1 G/1
1000 TABLET, FILM COATED ORAL 2 TIMES DAILY PRN
COMMUNITY

## 2024-10-23 RX ORDER — THIAMINE HCL 100 MG
2 TABLET ORAL 2 TIMES DAILY
COMMUNITY

## 2024-10-23 RX ORDER — ASPIRIN 81 MG/1
81 TABLET ORAL
COMMUNITY

## 2024-10-23 NOTE — ASSESSMENT & PLAN NOTE
The patient is having some difficulty with terminal extension when ambulating and she is going to continue working with physical therapy to improve this.  We will see her back in 8 weeks to see how she is doing at that time

## 2024-10-28 ENCOUNTER — TELEPHONE (OUTPATIENT)
Age: 79
End: 2024-10-28

## 2024-10-28 NOTE — TELEPHONE ENCOUNTER
----- Message from Jojo DUENAS sent at 10/28/2024 10:09 AM CDT -----  Regarding: Post-op problems - Dr. Sosa  Patient requests a call back please, she advised that she is having problems post surgery.    Thank you

## 2024-10-29 ENCOUNTER — TRANSCRIBE ORDERS (OUTPATIENT)
Dept: ADMINISTRATIVE | Facility: HOSPITAL | Age: 79
End: 2024-10-29
Payer: MEDICARE

## 2024-10-29 ENCOUNTER — OFFICE VISIT (OUTPATIENT)
Age: 79
End: 2024-10-29

## 2024-10-29 ENCOUNTER — HOSPITAL ENCOUNTER (OUTPATIENT)
Dept: ULTRASOUND IMAGING | Facility: HOSPITAL | Age: 79
Discharge: HOME | End: 2024-10-29
Admitting: ORTHOPAEDIC SURGERY
Payer: MEDICARE

## 2024-10-29 VITALS — BODY MASS INDEX: 21.22 KG/M2 | HEIGHT: 68 IN | WEIGHT: 140 LBS

## 2024-10-29 DIAGNOSIS — M25.462 PAIN AND SWELLING OF LEFT KNEE: Primary | ICD-10-CM

## 2024-10-29 DIAGNOSIS — M25.562 PAIN AND SWELLING OF LEFT KNEE: Primary | ICD-10-CM

## 2024-10-29 DIAGNOSIS — M25.462 PAIN AND SWELLING OF LEFT KNEE: ICD-10-CM

## 2024-10-29 DIAGNOSIS — Z96.651 ARTIFICIAL KNEE JOINT PRESENT, RIGHT: ICD-10-CM

## 2024-10-29 DIAGNOSIS — M25.562 PAIN AND SWELLING OF LEFT KNEE: ICD-10-CM

## 2024-10-29 PROCEDURE — 93971 EXTREMITY STUDY: CPT

## 2024-10-29 PROCEDURE — 99024 POSTOP FOLLOW-UP VISIT: CPT | Performed by: ORTHOPAEDIC SURGERY

## 2024-10-29 ASSESSMENT — ENCOUNTER SYMPTOMS
COLOR CHANGE: 0
BACK PAIN: 0

## 2024-10-29 NOTE — PROGRESS NOTES
will contact her after completion of the ultrasound regarding the results and if anticoagulation is appropriate.  Otherwise, she will follow-up on her regular scheduled interval    Discussed use, benefits, and side effects of any prescribed medications. All patient questions were answered. Patient voiced understanding of care plan.   Patient was given educational materials - see patient instructions below.         Electronically signed by Caden Sosa MD on 10/29/2024 at 11:56 AM

## 2024-10-30 ENCOUNTER — TELEPHONE (OUTPATIENT)
Age: 79
End: 2024-10-30

## 2024-10-30 NOTE — TELEPHONE ENCOUNTER
----- Message from Jojo DUENAS sent at 10/30/2024 12:12 PM CDT -----  Regarding: Test results - Dr. Sosa  Patient called to advise that she had tests done yesterday and would like to speak with Dr. Sosa's nurse about the results please.    Thank you

## 2024-10-30 NOTE — TELEPHONE ENCOUNTER
Called patient with test results and instructions from  to use a moist heat compress and gent massage. Advance activity as tolerated

## 2024-12-17 ENCOUNTER — OFFICE VISIT (OUTPATIENT)
Age: 79
End: 2024-12-17
Payer: COMMERCIAL

## 2024-12-17 VITALS — HEIGHT: 68 IN | BODY MASS INDEX: 21.22 KG/M2 | WEIGHT: 140 LBS

## 2024-12-17 DIAGNOSIS — Z96.641 H/O TOTAL HIP ARTHROPLASTY, RIGHT: Primary | ICD-10-CM

## 2024-12-17 DIAGNOSIS — Z48.89 AFTERCARE FOLLOWING SURGERY: ICD-10-CM

## 2024-12-17 PROCEDURE — 99213 OFFICE O/P EST LOW 20 MIN: CPT | Performed by: PHYSICIAN ASSISTANT

## 2024-12-17 PROCEDURE — 1123F ACP DISCUSS/DSCN MKR DOCD: CPT | Performed by: PHYSICIAN ASSISTANT

## 2024-12-17 ASSESSMENT — ENCOUNTER SYMPTOMS
BACK PAIN: 0
COLOR CHANGE: 0

## 2024-12-17 NOTE — ASSESSMENT & PLAN NOTE
AP pelvis and lateral hip x-rays were obtained today in office of the right hip.  These demonstrate a stable right total hip arthroplasty with no obvious sign of subsidence, disassociation, dislocation, fracture.    I did offer to obtain a CT scan of the right hip to investigate for any hardware loosening or fracture not seen on x-ray but the patient decided that she would like to \"watch it\" for now.  We will see her back otherwise in 2 years for recheck.

## 2024-12-17 NOTE — PROGRESS NOTES
[facility]  [facility]  [address] [place] [address]  Dept: [phone]  Dept Fax: [phone]  Loc: [phone]    Ashley Crisostomo is a 79 y.o. female who presents today for her medical conditions/complaints as noted below.  Ashley Crisostomo is complaining of Follow-up (Right Hip 1 year)        HPI:   Patient is a pleasant 79-year-old female presenting to the clinic today 1 year out from her right total hip replacement.  She states that she has done very well with this until about 2 weeks ago she noted some groin discomfort like she \"pulled a muscle.\"  She denies any injury or trauma to the hip.        Past Medical History:   Diagnosis Date    Constipation     Hypothyroidism        Past Surgical History:   Procedure Laterality Date    HIP SURGERY Right     TOTAL HIP ARTHROPLASTY Right 11/19/2023    HIP TOTAL ARTHROPLASTY ANTERIOR APPROACH performed by Caden Sosa MD at [facility] OR       No family history on file.    Social History     Tobacco Use    Smoking status: Never    Smokeless tobacco: Never   Substance Use Topics    Alcohol use: Never        Current Outpatient Medications   Medication Sig Dispense Refill    Aspirin-Calcium Carbonate  MG TABS       aspirin 81 MG EC tablet 1 tablet      B Complex CAPS Take by mouth daily      Calcium Citrate-Vitamin D3 315-6.25 MG-MCG TABS Take 2 tablets by mouth 2 times daily      diclofenac (VOLTAREN) 75 MG EC tablet       omeprazole (PRILOSEC) 20 MG delayed release capsule Take 1 capsule by mouth 2 times daily      rosuvastatin (CRESTOR) 10 MG tablet       valACYclovir (VALTREX) 1 g tablet Take 1 tablet by mouth 2 times daily as needed      Zinc Sulfate 66 MG TABS Take 1 tablet by mouth daily      Probiotic Product (PROBIOTIC DAILY PO) Take by mouth      NONFORMULARY Diclofenac sodium 75 mg      Calcium Carbonate-Vit D-Min (CALCIUM 600+D PLUS MINERALS PO) Take by mouth      UNABLE TO FIND Omega3

## 2024-12-20 ENCOUNTER — OFFICE VISIT (OUTPATIENT)
Dept: CARDIOLOGY CLINIC | Age: 79
End: 2024-12-20
Payer: COMMERCIAL

## 2024-12-20 VITALS
DIASTOLIC BLOOD PRESSURE: 64 MMHG | HEART RATE: 68 BPM | SYSTOLIC BLOOD PRESSURE: 136 MMHG | HEIGHT: 68 IN | BODY MASS INDEX: 22.13 KG/M2 | OXYGEN SATURATION: 95 % | WEIGHT: 146 LBS

## 2024-12-20 DIAGNOSIS — E78.5 DYSLIPIDEMIA: ICD-10-CM

## 2024-12-20 DIAGNOSIS — R07.9 CHEST PAIN, UNSPECIFIED TYPE: Primary | ICD-10-CM

## 2024-12-20 DIAGNOSIS — R94.31 ABNORMAL ELECTROCARDIOGRAPHY: ICD-10-CM

## 2024-12-20 PROCEDURE — 93000 ELECTROCARDIOGRAM COMPLETE: CPT | Performed by: NURSE PRACTITIONER

## 2024-12-20 PROCEDURE — 1123F ACP DISCUSS/DSCN MKR DOCD: CPT | Performed by: NURSE PRACTITIONER

## 2024-12-20 PROCEDURE — 99204 OFFICE O/P NEW MOD 45 MIN: CPT | Performed by: NURSE PRACTITIONER

## 2024-12-20 ASSESSMENT — ENCOUNTER SYMPTOMS
SORE THROAT: 0
COUGH: 0
WHEEZING: 0
SHORTNESS OF BREATH: 0
CHEST TIGHTNESS: 0

## 2024-12-20 NOTE — PATIENT INSTRUCTIONS
[place] at the [facility] [place] located on the first floor of [facility].   Enter through hospital main entrance and turn immediately to your left.    Patient's contact number:  [phone] (home)      Lexiscan Stress Test      Lexiscan (regadenoson injection) is a prescription drug given through an IV line that increases blood flow through the arteries of the heart during a cardiac nuclear stress test.     There are two parts to a Lexiscan stress test: the rest portion and the exercise portion. For the rest portion, a radioactive tracer is injected into your arm through the IV.  After 30 to 60 minutes, the process of imaging will begin.  A nuclear camera will be placed on your chest area and images are taken for the next 15 to 20 minutes.  For the exercise portion, a nurse will attach EKG electrodes to your chest to monitor your heart rate.  The drug Lexiscan is administered to simulate stress on the heart.  Your heart rhythm will then be monitored for the next few minutes. Your blood pressure will also be monitored throughout the exercise portion.  [place] through the exercise portion, a second round of radioactive tracer is injected into your body.  Your heart rate and EKG will be monitored for another few minutes after administering the drug.    Test Preparation:    Bring a list of your current medications.  Do not take any of your medications the morning of the test, but bring all morning medications with you as you will take them after the stress portion of the test is completed.    Do not eat Bananas 24 hours prior to test.    No caffeine 24 hours prior to the testing.  This includes: coffee, pop/soda, chocolate, cold medications, etc.  Any product that might contain caffeine.    No nicotine or alcohol 12 hours prior to your test.   Nothing to eat or drink 6-8 hours prior to appointment time.  It is okay to drink small amounts of water during the four hours prior to the

## 2024-12-20 NOTE — PROGRESS NOTES
[facility]  [address]  [address]  [place] [address]  [phone]      Chief Complaint / Reason for Being Seen: Chest pain    1. Chest pain, unspecified type    2. Abnormal electrocardiography    3. Dyslipidemia        Patient with a family history of heart failure with her 2 sisters and mother.  She is a non-smoker.  Her  is a patient of Dr. Pink and Dr. Gonzalez.  She is requesting Dr. Pink as her cardiologist.  She does have a history of hyperlipidemia.  She has had recent knee surgery using a cane.    Patient states that a few weeks ago she had had an episode of chest discomfort that was so intense that she had to throw her cell phone down and grab her chest.  It lasted less than 2 minutes.  Since that time she has been having intermittent left shoulder and arm pain.  This can occur with rest and activity.  She denies any shortness of breath.  She has noted some decreased stamina and fatigue.      Subjective:    Old records have been obtained from the referring providers.  Those records have been reviewed and summarized.      Ashley Crisostomo is a 79 y.o. female with the following history as recorded in [facility]:  Patient Active Problem List    Diagnosis Date Noted    H/O total hip arthroplasty, right 12/17/2024    Pain and swelling of left knee 10/29/2024    Artificial knee joint present, right 10/29/2024    Leg cramps 10/23/2024    Status post total knee replacement, left 10/22/2024    Primary osteoarthritis of left knee 09/16/2024    Abdominal wall hernia 03/06/2024    Closed displaced fracture of right femoral neck (HCC) 11/19/2023    Hypothyroidism 11/19/2023    Posterior tibial tendon dysfunction, right 06/27/2023    VICTORIA (obstructive sleep apnea) 09/16/2022    Snoring 09/16/2022    Venous insufficiency 08/09/2018    Foot drop, right 07/02/2018    Melena 09/20/2017    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

## 2025-01-03 ENCOUNTER — OFFICE VISIT (OUTPATIENT)
Age: 80
End: 2025-01-03

## 2025-01-03 VITALS — BODY MASS INDEX: 22.13 KG/M2 | WEIGHT: 146 LBS | HEIGHT: 68 IN

## 2025-01-03 DIAGNOSIS — Z48.89 AFTERCARE FOLLOWING SURGERY: ICD-10-CM

## 2025-01-03 DIAGNOSIS — Z96.652 STATUS POST TOTAL KNEE REPLACEMENT, LEFT: Primary | ICD-10-CM

## 2025-01-03 RX ORDER — POLYETHYLENE GLYCOL 3350 17 G/17G
POWDER, FOR SOLUTION ORAL
COMMUNITY

## 2025-01-03 RX ORDER — ESTRADIOL 1 MG/1
1 TABLET ORAL
COMMUNITY
End: 2025-01-03

## 2025-01-03 RX ORDER — ALBUTEROL SULFATE 90 UG/1
2 INHALANT RESPIRATORY (INHALATION) EVERY 4 HOURS PRN
COMMUNITY

## 2025-01-03 RX ORDER — VENLAFAXINE HYDROCHLORIDE 37.5 MG/1
CAPSULE, EXTENDED RELEASE ORAL
COMMUNITY
End: 2025-01-03

## 2025-01-03 RX ORDER — ACETAMINOPHEN 325 MG/1
TABLET ORAL
COMMUNITY

## 2025-01-03 RX ORDER — OXYCODONE HYDROCHLORIDE 5 MG/1
TABLET ORAL
COMMUNITY

## 2025-01-03 RX ORDER — TEMAZEPAM 15 MG/1
CAPSULE ORAL
COMMUNITY

## 2025-01-03 RX ORDER — HYDROCODONE BITARTRATE AND ACETAMINOPHEN 7.5; 325 MG/1; MG/1
TABLET ORAL
COMMUNITY
Start: 2024-09-16 | End: 2025-01-03

## 2025-01-03 RX ORDER — AMOXICILLIN 250 MG
CAPSULE ORAL
COMMUNITY
End: 2025-01-03

## 2025-01-03 RX ORDER — TRAMADOL HYDROCHLORIDE 50 MG/1
50 TABLET ORAL EVERY 6 HOURS PRN
COMMUNITY
Start: 2024-04-16 | End: 2025-01-03

## 2025-01-03 ASSESSMENT — ENCOUNTER SYMPTOMS
BACK PAIN: 0
COLOR CHANGE: 0

## 2025-01-03 NOTE — PROGRESS NOTES
[facility]  [facility]  [address] [place] [address]  Dept: [phone]  Dept Fax: [phone]  Loc: [phone]    Ashley Crisostomo is a 79 y.o. female who presents today for her medical conditions/complaints as noted below.  Ashley Crisostomo is complaining of Follow-up (Left Knee)        HPI:   Patient is a 79-year-old female presenting to the clinic today about 3-1/2 months out from her left total knee replacement.  She states that she had been doing well but has noted some aches and pains here and there in the past month.  She is no longer working with formal physical therapy.  She denies any injury or trauma to the knee.        Past Medical History:   Diagnosis Date    Constipation     Hypothyroidism        Past Surgical History:   Procedure Laterality Date    HIP SURGERY Right     TOTAL HIP ARTHROPLASTY Right 11/19/2023    HIP TOTAL ARTHROPLASTY ANTERIOR APPROACH performed by Caden Sosa MD at [facility] OR       No family history on file.    Social History     Tobacco Use    Smoking status: Never    Smokeless tobacco: Never   Substance Use Topics    Alcohol use: Never        Current Outpatient Medications   Medication Sig Dispense Refill    acetaminophen (TYLENOL) 325 MG tablet       albuterol sulfate HFA (PROVENTIL;VENTOLIN;PROAIR) 108 (90 Base) MCG/ACT inhaler Inhale 2 puffs into the lungs every 4 hours as needed      magnesium hydroxide (MILK OF MAGNESIA) 400 MG/5ML suspension       oxyCODONE (ROXICODONE) 5 MG immediate release tablet       polyethylene glycol (HEALTHYLAX) 17 g packet       temazepam (RESTORIL) 15 MG capsule Take by oral route for 90 days.      aspirin 81 MG EC tablet 1 tablet      B Complex CAPS Take by mouth daily      Calcium Citrate-Vitamin D3 315-6.25 MG-MCG TABS Take 2 tablets by mouth 2 times daily      diclofenac (VOLTAREN) 75 MG EC tablet       omeprazole (PRILOSEC) 20 MG delayed release capsule Take 1 capsule by

## 2025-01-03 NOTE — ASSESSMENT & PLAN NOTE
Updated imaging of the left knee was obtained today in office and continue to demonstrate a stable left total knee arthroplasty with no obvious sign of subsidence, disassociation, dislocation, fracture.    I did offer to start the patient back in formal physical therapy but she would like to do this on her own at home.  We will see her back in 6 weeks for reevaluation to see how she is progressing.

## 2025-01-23 ENCOUNTER — TELEPHONE (OUTPATIENT)
Dept: SURGERY | Facility: CLINIC | Age: 80
End: 2025-01-23
Payer: MEDICARE

## 2025-01-23 NOTE — TELEPHONE ENCOUNTER
Attempted to contact PT regarding scheduling for their referral. I left them a voicemail with our office number and requested they call us back at their earliest convenience to get scheduled.    CBC  01/23

## 2025-02-10 ENCOUNTER — OFFICE VISIT (OUTPATIENT)
Dept: CARDIOLOGY | Facility: CLINIC | Age: 80
End: 2025-02-10
Payer: MEDICARE

## 2025-02-10 VITALS
SYSTOLIC BLOOD PRESSURE: 140 MMHG | DIASTOLIC BLOOD PRESSURE: 68 MMHG | OXYGEN SATURATION: 98 % | WEIGHT: 142 LBS | HEIGHT: 67 IN | BODY MASS INDEX: 22.29 KG/M2 | HEART RATE: 65 BPM

## 2025-02-10 DIAGNOSIS — E78.5 DYSLIPIDEMIA: ICD-10-CM

## 2025-02-10 DIAGNOSIS — R07.9 CHEST PAIN, UNSPECIFIED TYPE: Primary | ICD-10-CM

## 2025-02-10 DIAGNOSIS — G47.33 OSA (OBSTRUCTIVE SLEEP APNEA): ICD-10-CM

## 2025-02-10 PROCEDURE — 99204 OFFICE O/P NEW MOD 45 MIN: CPT | Performed by: INTERNAL MEDICINE

## 2025-02-10 PROCEDURE — 93000 ELECTROCARDIOGRAM COMPLETE: CPT | Performed by: INTERNAL MEDICINE

## 2025-02-10 RX ORDER — ASPIRIN 81 MG/1
81 TABLET ORAL DAILY
COMMUNITY

## 2025-02-10 NOTE — PROGRESS NOTES
"  Reason for Visit: cardiovascular follow up.    HPI:  Marily Reese is a 79 y.o. female is being seen for consultation today at the request of VIKKI Allen.  She started having chest pain about 6 weeks ago.  She had pain on the left side of her chest and lasted a couple of minutes.  She was sitting on the side of the bed and talking when it happened.  It resolved spontaneously.  She describes it as a \"hard\" pain.  The pain has not returned to that severity, but she has noticed some mild discomfort.  She has not been able to exercise much due to a Baker's cyst as well as other musculoskeletal issues.      Previous Cardiac Testing and Procedures:  -Venous ultrasound (10/29/2024) no evidence of left leg DVT    Lab data:  -proBNP (6/3/2024) 104.6, normal 0-18 100  -BMP (9/12/2024) creatinine 0.72, potassium 4.1, sodium 142    Patient Active Problem List   Diagnosis    Leg cramps    Foot drop, right    Venous insufficiency    LALO (obstructive sleep apnea)    Snoring    Daytime somnolence    Posterior tibial tendon dysfunction, right    Primary osteoarthritis of left knee    Dyslipidemia       Social History     Tobacco Use    Smoking status: Never    Smokeless tobacco: Never   Vaping Use    Vaping status: Never Used   Substance Use Topics    Alcohol use: Not Currently     Comment: None    Drug use: No       Family History   Problem Relation Age of Onset    Hypertension Mother     Stroke Mother     Coronary artery disease Mother     Heart failure Mother     Osteoarthritis Mother     Thyroid disease Mother     Diabetes Maternal Grandfather     Heart failure Maternal Grandmother     Osteoarthritis Maternal Grandmother     Thyroid disease Brother         Half brother       The following portions of the patient's history were reviewed and updated as appropriate: allergies, current medications, past family history, past medical history, past social history, past surgical history, and problem list.      Current " "Outpatient Medications:     aspirin 81 MG EC tablet, Take 1 tablet by mouth Daily., Disp: , Rfl:     B Complex Vitamins (vitamin b complex) capsule capsule, Take  by mouth Daily., Disp: , Rfl:     Calcium Citrate-Vitamin D3 (CITRACAL) 315-6.25 MG-MCG tablet tablet, Take 2 tablets by mouth 2 (Two) Times a Day., Disp: , Rfl:     DICLOFENAC PO, Take 75 mg by mouth Daily., Disp: , Rfl:     docusate sodium (COLACE) 100 MG capsule, Take 1 capsule by mouth 2 (Two) Times a Day., Disp: 60 capsule, Rfl: 1    levothyroxine (SYNTHROID, LEVOTHROID) 75 MCG tablet, Take 1 tablet by mouth Daily., Disp: , Rfl:     Omega-3 Fatty Acids (OMEGA 3 500 PO), Take  by mouth., Disp: , Rfl:     omeprazole (priLOSEC) 20 MG capsule, Take 1 capsule by mouth 2 (Two) Times a Day., Disp: , Rfl:     rOPINIRole (REQUIP) 0.25 MG tablet, Take 1 tablet by mouth Every Night. Take 1 hour before bedtime., Disp: , Rfl:     vitamin D3 125 MCG (5000 UT) capsule capsule, Take 2,000 Units by mouth Daily., Disp: , Rfl:     Zinc Sulfate (Zinc 15) 66 MG tablet, Take 1 tablet by mouth Daily., Disp: , Rfl:     valACYclovir (Valtrex) 1000 MG tablet, Take 1 tablet by mouth 2 (Two) Times a Day As Needed (FEVER BLISTERS). (Patient not taking: Reported on 2/10/2025), Disp: , Rfl:     Review of Systems   Constitutional: Negative for chills and fever.   Cardiovascular:  Positive for chest pain. Negative for paroxysmal nocturnal dyspnea.   Respiratory:  Negative for cough and shortness of breath.    Skin:  Negative for rash.   Gastrointestinal:  Negative for abdominal pain and heartburn.   Neurological:  Negative for dizziness and numbness.       Objective   /68 (BP Location: Left arm, Patient Position: Sitting, Cuff Size: Adult)   Pulse 65   Ht 170 cm (66.93\")   Wt 64.4 kg (142 lb)   SpO2 98%   BMI 22.29 kg/m²   Constitutional:       Appearance: Well-developed.   HENT:      Head: Normocephalic and atraumatic.   Pulmonary:      Effort: Pulmonary effort is normal. "      Breath sounds: Normal breath sounds.   Cardiovascular:      Normal rate. Regular rhythm.   Edema:     Peripheral edema absent.   Skin:     General: Skin is warm and dry.   Neurological:      Mental Status: Alert and oriented to person, place, and time.         ECG 12 Lead    Date/Time: 2/10/2025 11:50 AM  Performed by: Logan Pretty MD    Authorized by: Logan Pretty MD  Comparison: compared with previous ECG from 6/3/2024  Similar to previous ECG  Rhythm: sinus rhythm  Rate: normal  Conduction: 1st degree AV block  QRS axis: left            ICD-10-CM ICD-9-CM   1. Chest pain, unspecified type  R07.9 786.50   2. Dyslipidemia  E78.5 272.4   3. LALO (obstructive sleep apnea)  G47.33 327.23         Assessment/Plan:  1.  Chest pain: One significant episode of chest pain of unclear etiology with some subsequent smaller episodes with some atypical characteristics.  Etiology is unclear.  Evaluate further with a stress echo.    2.  Dyslipidemia: Continue omega 3 fatty acids.  Obtain copy of last lipid panel from PCP.     3.  Obstructive sleep apnea: Patient has inspire device in place.

## 2025-02-10 NOTE — LETTER
"February 10, 2025     VIKKI Allen  [address] [place] [id number]    Patient: Marily Reese   YOB: 1945   Date of Visit: 2/10/2025       Dear VIKKI Allen,    Thank you for referring Marily Reese to me for evaluation. Below is a copy of my consult note.    If you have questions, please do not hesitate to call me. I look forward to following [place] along with you.         Sincerely,        Logan Pretty MD        CC: No Recipients      Reason for Visit: cardiovascular follow up.    HPI:  Marily Reese is a 79 y.o. female is being seen for consultation today at the request of VIKKI Allen.  She started having chest pain about 6 weeks ago.  She had pain on the left side of her chest and lasted a couple of minutes.  She was sitting on the side of the bed and talking when it happened.  It resolved spontaneously.  She describes it as a \"hard\" pain.  The pain has not returned to that severity, but she has noticed some mild discomfort.  She has not been able to exercise much due to a Baker's cyst as well as other musculoskeletal issues.      Previous Cardiac Testing and Procedures:  -Venous ultrasound (10/29/2024) no evidence of left leg DVT    Lab data:  -proBNP (6/3/2024) 104.6, normal 0-18 100  -BMP (9/12/2024) creatinine 0.72, potassium 4.1, sodium 142    Patient Active Problem List   Diagnosis   • Leg cramps   • Foot drop, right   • Venous insufficiency   • LALO (obstructive sleep apnea)   • Snoring   • Daytime somnolence   • Posterior tibial tendon dysfunction, right   • Primary osteoarthritis of left knee       Social History     Tobacco Use   • Smoking status: Never   • Smokeless tobacco: Never   Vaping Use   • Vaping status: Never Used   Substance Use Topics   • Alcohol use: Not Currently     Comment: None   • Drug use: No       Family History   Problem Relation Age of Onset   • Hypertension Mother    • Stroke Mother    • Coronary artery disease Mother  "   • Heart failure Mother    • Osteoarthritis Mother    • Thyroid disease Mother    • Diabetes Maternal Grandfather    • Heart failure Maternal Grandmother    • Osteoarthritis Maternal Grandmother    • Thyroid disease Brother         Half brother       The following portions of the patient's history were reviewed and updated as appropriate: allergies, current medications, past family history, past medical history, past social history, past surgical history, and problem list.      Current Outpatient Medications:   •  aspirin 81 MG EC tablet, Take 1 tablet by mouth Daily., Disp: , Rfl:   •  B Complex Vitamins (vitamin b complex) capsule capsule, Take  by mouth Daily., Disp: , Rfl:   •  Calcium Citrate-Vitamin D3 (CITRACAL) 315-6.25 MG-MCG tablet tablet, Take 2 tablets by mouth 2 (Two) Times a Day., Disp: , Rfl:   •  DICLOFENAC PO, Take 75 mg by mouth Daily., Disp: , Rfl:   •  docusate sodium (COLACE) 100 MG capsule, Take 1 capsule by mouth 2 (Two) Times a Day., Disp: 60 capsule, Rfl: 1  •  levothyroxine (SYNTHROID, LEVOTHROID) 75 MCG tablet, Take 1 tablet by mouth Daily., Disp: , Rfl:   •  Omega-3 Fatty Acids (OMEGA 3 500 PO), Take  by mouth., Disp: , Rfl:   •  omeprazole (priLOSEC) 20 MG capsule, Take 1 capsule by mouth 2 (Two) Times a Day., Disp: , Rfl:   •  rOPINIRole (REQUIP) 0.25 MG tablet, Take 1 tablet by mouth Every Night. Take 1 hour before bedtime., Disp: , Rfl:   •  vitamin D3 125 MCG (5000 UT) capsule capsule, Take 2,000 Units by mouth Daily., Disp: , Rfl:   •  Zinc Sulfate (Zinc 15) 66 MG tablet, Take 1 tablet by mouth Daily., Disp: , Rfl:   •  valACYclovir (Valtrex) 1000 MG tablet, Take 1 tablet by mouth 2 (Two) Times a Day As Needed (FEVER BLISTERS). (Patient not taking: Reported on 2/10/2025), Disp: , Rfl:     Review of Systems   Constitutional: Negative for chills and fever.   Cardiovascular:  Positive for chest pain. Negative for paroxysmal nocturnal dyspnea.   Respiratory:  Negative for cough and  "shortness of breath.    Skin:  Negative for rash.   Gastrointestinal:  Negative for abdominal pain and heartburn.   Neurological:  Negative for dizziness and numbness.       Objective  /68 (BP Location: Left arm, Patient Position: Sitting, Cuff Size: Adult)   Pulse 65   Ht 170 cm (66.93\")   Wt 64.4 kg (142 lb)   SpO2 98%   BMI 22.29 kg/m²   Constitutional:       Appearance: Well-developed.   HENT:      Head: Normocephalic and atraumatic.   Pulmonary:      Effort: Pulmonary effort is normal.      Breath sounds: Normal breath sounds.   Cardiovascular:      Normal rate. Regular rhythm.   Edema:     Peripheral edema absent.   Skin:     General: Skin is warm and dry.   Neurological:      Mental Status: Alert and oriented to person, place, and time.         ECG 12 Lead    Date/Time: 2/10/2025 11:50 AM  Performed by: Logan Pretty MD    Authorized by: Logan Pretty MD  Comparison: compared with previous ECG from 6/3/2024  Similar to previous ECG  Rhythm: sinus rhythm  Rate: normal  Conduction: 1st degree AV block  QRS axis: left            ICD-10-CM ICD-9-CM   1. Chest pain, unspecified type  R07.9 786.50         Assessment/Plan:  1.  Chest pain:    2.  Dyslipidemia: She is managed on omega 3 fatty acids.  Obtain copy of last lipid panel from PCP.   "

## 2025-02-27 ENCOUNTER — HOSPITAL ENCOUNTER (OUTPATIENT)
Dept: CARDIOLOGY | Facility: HOSPITAL | Age: 80
Discharge: HOME | End: 2025-02-27
Admitting: INTERNAL MEDICINE
Payer: MEDICARE

## 2025-02-27 VITALS
HEIGHT: 67 IN | SYSTOLIC BLOOD PRESSURE: 114 MMHG | HEART RATE: 61 BPM | BODY MASS INDEX: 22.28 KG/M2 | DIASTOLIC BLOOD PRESSURE: 57 MMHG | WEIGHT: 141.98 LBS

## 2025-02-27 PROCEDURE — 93017 CV STRESS TEST TRACING ONLY: CPT

## 2025-02-27 PROCEDURE — 93350 STRESS TTE ONLY: CPT

## 2025-02-27 PROCEDURE — 25510000001 PERFLUTREN 6.52 MG/ML SUSPENSION: Performed by: INTERNAL MEDICINE

## 2025-02-27 PROCEDURE — 25010000002 DOBUTAMINE PER 250 MG: Performed by: INTERNAL MEDICINE

## 2025-02-27 RX ORDER — METOPROLOL TARTRATE 1 MG/ML
5 INJECTION, SOLUTION INTRAVENOUS ONCE
Status: DISCONTINUED | OUTPATIENT
Start: 2025-02-27 | End: 2025-02-28 | Stop reason: HOSPADM

## 2025-02-27 RX ORDER — DOBUTAMINE HYDROCHLORIDE 100 MG/100ML
10-50 INJECTION INTRAVENOUS
Status: DISCONTINUED | OUTPATIENT
Start: 2025-02-27 | End: 2025-02-27

## 2025-02-27 RX ADMIN — DOBUTAMINE HYDROCHLORIDE 10 MCG/KG/MIN: 100 INJECTION INTRAVENOUS at 15:44

## 2025-02-27 RX ADMIN — PERFLUTREN 8.48 MG: 6.52 INJECTION, SUSPENSION INTRAVENOUS at 15:24

## 2025-02-28 LAB
BH CV STRESS BP STAGE 1: NORMAL
BH CV STRESS BP STAGE 2: NORMAL
BH CV STRESS BP STAGE 3: NORMAL
BH CV STRESS DOSE DOBUTAMINE STAGE 1: 10
BH CV STRESS DOSE DOBUTAMINE STAGE 2: 20
BH CV STRESS DOSE DOBUTAMINE STAGE 3: 30
BH CV STRESS DURATION MIN STAGE 1: 3
BH CV STRESS DURATION MIN STAGE 2: 3
BH CV STRESS DURATION MIN STAGE 3: 1
BH CV STRESS DURATION SEC STAGE 1: 0
BH CV STRESS DURATION SEC STAGE 2: 0
BH CV STRESS DURATION SEC STAGE 3: 15
BH CV STRESS HR STAGE 1: 60
BH CV STRESS HR STAGE 2: 101
BH CV STRESS HR STAGE 3: 123
BH CV STRESS PROTOCOL 1: NORMAL
BH CV STRESS RECOVERY BP: NORMAL MMHG
BH CV STRESS RECOVERY HR: 75 BPM
BH CV STRESS STAGE 1: 1
BH CV STRESS STAGE 2: 2
BH CV STRESS STAGE 3: 3
MAXIMAL PREDICTED HEART RATE: 143 BPM
PERCENT MAX PREDICTED HR: 86.01 %
STRESS BASELINE BP: NORMAL MMHG
STRESS BASELINE HR: 59 BPM
STRESS PERCENT HR: 101 %
STRESS POST EXERCISE DUR MIN: 7 MIN
STRESS POST EXERCISE DUR SEC: 15 SEC
STRESS POST PEAK BP: NORMAL MMHG
STRESS POST PEAK HR: 123 BPM
STRESS TARGET HR: 122 BPM

## 2025-03-04 ENCOUNTER — TELEPHONE (OUTPATIENT)
Dept: CARDIOLOGY | Facility: CLINIC | Age: 80
End: 2025-03-04
Payer: MEDICARE

## 2025-03-04 NOTE — TELEPHONE ENCOUNTER
----- Message from Logan Pretty sent at 3/3/2025  5:05 PM CST -----  Please let her know that the stress test is low risk and shows no evidence of blockages.

## 2025-03-05 ENCOUNTER — OFFICE VISIT (OUTPATIENT)
Age: 80
End: 2025-03-05
Payer: MEDICARE

## 2025-03-05 VITALS — BODY MASS INDEX: 21.79 KG/M2 | WEIGHT: 143.8 LBS | HEIGHT: 68 IN

## 2025-03-05 DIAGNOSIS — Z96.652 PRESENCE OF ARTIFICIAL KNEE JOINT, LEFT: ICD-10-CM

## 2025-03-05 DIAGNOSIS — I87.2 VENOUS INSUFFICIENCY: Primary | ICD-10-CM

## 2025-03-05 DIAGNOSIS — Z96.641 PRESENCE OF ARTIFICIAL HIP JOINT, RIGHT: ICD-10-CM

## 2025-03-05 PROCEDURE — 1123F ACP DISCUSS/DSCN MKR DOCD: CPT | Performed by: ORTHOPAEDIC SURGERY

## 2025-03-05 PROCEDURE — 1159F MED LIST DOCD IN RCRD: CPT | Performed by: ORTHOPAEDIC SURGERY

## 2025-03-05 PROCEDURE — 99214 OFFICE O/P EST MOD 30 MIN: CPT | Performed by: ORTHOPAEDIC SURGERY

## 2025-03-05 ASSESSMENT — ENCOUNTER SYMPTOMS
RESPIRATORY NEGATIVE: 1
EYES NEGATIVE: 1
GASTROINTESTINAL NEGATIVE: 1
ALLERGIC/IMMUNOLOGIC NEGATIVE: 1

## 2025-03-05 NOTE — PROGRESS NOTES
[facility]  [facility]  [address]   [facility] [id number] [phone]       Ashley Crisostomo (:  1945) is a 79 y.o. female,Established patient, here for evaluation of the following chief complaint(s):  Follow-up (Lt TKR/Sx: 2024)        Assessment & Plan  1. Baker's cyst.  The Baker's cyst appears to be reducing in size, which is a typical progression. The incision site is benign, well-healed, and exhibits satisfactory collagen remodeling. The observed redness is not indicative of erythema or infection, but rather a sign of new collagen formation. The warmth sensation is likely due to the body's adjustment to the presence of cobalt chromium or titanium, and this should gradually subside over time. Both knees currently exhibit similar temperatures. The range of motion in the left knee is commendable, with terminal extension and 130 degrees of flexion. A referral for home-based physical therapy will be initiated to aid in rehabilitation and strengthen the legs. If progress plateaus, transitioning to facility-based therapy 2 to 3 times a week may be considered.    2. Right hip pain.  The patient reports significant pain in the right hip, which may be due to increased weight-bearing during the rehabilitation phase of the left knee. Despite the pain, the hip motion remains excellent. Home-based physical therapy will also address the right hip to alleviate discomfort and improve strength.    Follow-up  The patient will follow up in 6 months.    PROCEDURE  The patient underwent a successful hip replacement surgery several years ago.       ICD-10-CM    1. Venous insufficiency  I87.2       2. Presence of artificial knee joint, left  Z96.652       3. Presence of artificial hip joint, right  Z96.641           Return in about 6 months (around 2025).    SUBJECTIVE/OBJECTIVE:  History of Present Illness  The patient presents for

## 2025-04-17 ENCOUNTER — TELEPHONE (OUTPATIENT)
Dept: SURGERY | Facility: CLINIC | Age: 80
End: 2025-04-17

## 2025-04-17 NOTE — TELEPHONE ENCOUNTER
Caller: Marily Reese    Relationship:  Self    Best call back number: [phone]     PATIENT CALLED REQUESTING TO CANCEL SAME DAY APPT.    Did the patient call AFTER the start time of their scheduled appointment?  []YES  [x]NO    Was the patient's appointment rescheduled? []YES  [x]NO    Any additional information: PT HAS ANOTHER APPT - WANTS TO WAIT TO RESCHEDULE TO SEE HOW THAT GOES

## 2025-04-21 ENCOUNTER — OFFICE VISIT (OUTPATIENT)
Dept: CARDIOLOGY | Facility: CLINIC | Age: 80
End: 2025-04-21
Payer: MEDICARE

## 2025-04-21 VITALS
BODY MASS INDEX: 22.29 KG/M2 | OXYGEN SATURATION: 97 % | SYSTOLIC BLOOD PRESSURE: 112 MMHG | HEIGHT: 67 IN | WEIGHT: 142 LBS | HEART RATE: 62 BPM | DIASTOLIC BLOOD PRESSURE: 48 MMHG

## 2025-04-21 DIAGNOSIS — E78.5 DYSLIPIDEMIA: ICD-10-CM

## 2025-04-21 DIAGNOSIS — R07.9 CHEST PAIN, UNSPECIFIED TYPE: Primary | ICD-10-CM

## 2025-04-21 PROCEDURE — 99214 OFFICE O/P EST MOD 30 MIN: CPT | Performed by: INTERNAL MEDICINE

## 2025-04-21 PROCEDURE — 1159F MED LIST DOCD IN RCRD: CPT | Performed by: INTERNAL MEDICINE

## 2025-04-21 PROCEDURE — 1160F RVW MEDS BY RX/DR IN RCRD: CPT | Performed by: INTERNAL MEDICINE

## 2025-04-21 NOTE — LETTER
"April 21, 2025     VIKKI Allen  [address] [place] [id number]    Patient: Marily Reese   YOB: 1945   Date of Visit: 4/21/2025       Dear VIKKI Allen    Marily Reese was in my office today. Below is a copy of my note.    If you have questions, please do not hesitate to call me. I look forward to following [place] along with you.         Sincerely,        Logan Pretty MD        CC: No Recipients      Reason for Visit: cardiovascular follow up.    HPI:  Marily Reese is a 79 y.o. female is here today for follow-up.  She is seen in cardiology consultation on 2/10/2025.  She reported episodes of left-sided chest pain.  Stress echo was ordered for further evaluation.  This was done on 2/27/2025 and was low risk.  She reports feeling better today and notes just a \"little needling pain\" every once in a while.  She is concerned because heart disease runs in her family.  She walks with a cane due to a bad foot, but remains active.      Previous Cardiac Testing and Procedures:  -Venous ultrasound (10/29/2024) no evidence of left leg DVT  -Dobutamine stress echo (2/27/2025) low risk for ischemia     Lab data:  -proBNP (6/3/2024) 104.6, normal 0-1,800  -BMP (9/12/2024) creatinine 0.72, potassium 4.1, sodium 142    Patient Active Problem List   Diagnosis   • Leg cramps   • Foot drop, right   • Venous insufficiency   • LALO (obstructive sleep apnea)   • Snoring   • Daytime somnolence   • Posterior tibial tendon dysfunction, right   • Primary osteoarthritis of left knee   • Dyslipidemia       Social History     Tobacco Use   • Smoking status: Never   • Smokeless tobacco: Never   Vaping Use   • Vaping status: Never Used   Substance Use Topics   • Alcohol use: Never     Comment: None   • Drug use: No       Family History   Problem Relation Age of Onset   • Hypertension Mother    • Stroke Mother    • Coronary artery disease Mother    • Heart failure Mother    • Osteoarthritis " Mother    • Thyroid disease Mother    • Arthritis Mother    • Heart disease Mother    • Diabetes Maternal Grandfather    • Heart failure Maternal Grandmother    • Osteoarthritis Maternal Grandmother    • Thyroid disease Brother         Half brother       The following portions of the patient's history were reviewed and updated as appropriate: allergies, current medications, past family history, past medical history, past social history, past surgical history, and problem list.      Current Outpatient Medications:   •  aspirin 81 MG EC tablet, Take 1 tablet by mouth Daily., Disp: , Rfl:   •  B Complex Vitamins (vitamin b complex) capsule capsule, Take  by mouth Daily., Disp: , Rfl:   •  Calcium Citrate-Vitamin D3 (CITRACAL) 315-6.25 MG-MCG tablet tablet, Take 2 tablets by mouth 2 (Two) Times a Day., Disp: , Rfl:   •  DICLOFENAC PO, Take 75 mg by mouth Daily., Disp: , Rfl:   •  docusate sodium (COLACE) 100 MG capsule, Take 1 capsule by mouth 2 (Two) Times a Day., Disp: 60 capsule, Rfl: 1  •  levothyroxine (SYNTHROID, LEVOTHROID) 75 MCG tablet, Take 1 tablet by mouth Daily., Disp: , Rfl:   •  Omega-3 Fatty Acids (OMEGA 3 500 PO), Take  by mouth., Disp: , Rfl:   •  omeprazole (priLOSEC) 20 MG capsule, Take 1 capsule by mouth 2 (Two) Times a Day., Disp: , Rfl:   •  rOPINIRole (REQUIP) 0.25 MG tablet, Take 1 tablet by mouth Every Night. Take 1 hour before bedtime., Disp: , Rfl:   •  valACYclovir (Valtrex) 1000 MG tablet, Take 1 tablet by mouth 2 (Two) Times a Day As Needed (FEVER BLISTERS)., Disp: , Rfl:   •  vitamin D3 125 MCG (5000 UT) capsule capsule, Take 2,000 Units by mouth Daily., Disp: , Rfl:   •  Zinc Sulfate (Zinc 15) 66 MG tablet, Take 1 tablet by mouth Daily., Disp: , Rfl:     Review of Systems   Constitutional: Negative for chills and fever.   Cardiovascular:  Positive for chest pain (needling pain). Negative for paroxysmal nocturnal dyspnea.   Respiratory:  Negative for cough and shortness of breath.   "  Skin:  Negative for rash.   Gastrointestinal:  Negative for abdominal pain and heartburn.   Neurological:  Negative for dizziness and numbness.       Objective  /48 (BP Location: Left arm, Patient Position: Sitting, Cuff Size: Adult)   Pulse 62   Ht 170 cm (66.93\")   Wt 64.4 kg (142 lb)   SpO2 97%   BMI 22.29 kg/m²   Constitutional:       Appearance: Well-developed.   HENT:      Head: Normocephalic and atraumatic.   Pulmonary:      Effort: Pulmonary effort is normal.      Breath sounds: Normal breath sounds.   Cardiovascular:      Normal rate. Regular rhythm.   Edema:     Peripheral edema absent.   Skin:     General: Skin is warm and dry.   Neurological:      Mental Status: Alert and oriented to person, place, and time.       Procedures      ICD-10-CM ICD-9-CM   1. Chest pain, unspecified type  R07.9 786.50   2. Dyslipidemia  E78.5 272.4         Assessment/Plan:  1.  Chest pain: Previous episodes remain of unclear etiology.  They are somewhat atypical.  Dobutamine stress echo on 2/27/2025 was low risk.   on lifestyle modification and risk factor reduction to reduce the risk of any future cardiovascular events.      2.  Dyslipidemia: No recent lipid panel available.  Continue omega 3 fatty acids.    "

## 2025-08-06 ENCOUNTER — OFFICE VISIT (OUTPATIENT)
Age: 80
End: 2025-08-06

## 2025-08-06 DIAGNOSIS — Z96.652 PRESENCE OF ARTIFICIAL KNEE JOINT, LEFT: Primary | ICD-10-CM

## 2025-08-06 RX ORDER — GABAPENTIN 300 MG/1
300 CAPSULE ORAL
Qty: 90 CAPSULE | Refills: 1 | Status: SHIPPED | OUTPATIENT
Start: 2025-08-06 | End: 2026-02-02

## 2025-08-06 ASSESSMENT — ENCOUNTER SYMPTOMS
RESPIRATORY NEGATIVE: 1
EYES NEGATIVE: 1
ALLERGIC/IMMUNOLOGIC NEGATIVE: 1
GASTROINTESTINAL NEGATIVE: 1

## (undated) DEVICE — OPTIFOAM GENTLE SA, POSTOP, 4X12: Brand: MEDLINE

## (undated) DEVICE — HDRST POSITIONING FM RND 2X9IN

## (undated) DEVICE — CABL BIPOL MEGADYNE 12FT DISP

## (undated) DEVICE — TOWEL,OR,DSP,ST,BLUE,STD,4/PK,20PK/CS: Brand: MEDLINE

## (undated) DEVICE — VAGINAL PREP TRAY: Brand: MEDLINE INDUSTRIES, INC.

## (undated) DEVICE — ST INF 2NDARY 20DRP VNT/NOVNT 30IN

## (undated) DEVICE — PROBE 8225401 5PK SD-SD BIPOL STIM ROHS

## (undated) DEVICE — ANTIBACTERIAL UNDYED BRAIDED (POLYGLACTIN 910), SYNTHETIC ABSORBABLE SUTURE: Brand: COATED VICRYL

## (undated) DEVICE — AGENT HEMSTAT 3GM OXIDIZED REGENERATED CELOS ABSRB FOR CONT (ORDER MULTIPLES OF 5EA)

## (undated) DEVICE — ELECTRD BLD EZ CLN MOD XLNG 2.75IN

## (undated) DEVICE — SYR LUERLOK 30CC

## (undated) DEVICE — GLV SURG BIOGEL LTX PF 8

## (undated) DEVICE — CODMAN® DISPOSABLE CATHETER PASSER: Brand: CODMAN®

## (undated) DEVICE — LP VESL MINI BLU PK/2

## (undated) DEVICE — COVER POS PERINL POST NS 082501

## (undated) DEVICE — TUBING, SUCTION, 1/4" X 12', STRAIGHT: Brand: MEDLINE

## (undated) DEVICE — PK KN TOTL 30

## (undated) DEVICE — PROXIMATE RH ROTATING HEAD SKIN STAPLERS (35 WIDE) CONTAINS 35 STAINLESS STEEL STAPLES: Brand: PROXIMATE

## (undated) DEVICE — OSCILLATING TIP SAW CARTRIDGE: Brand: PRECISION FALCON

## (undated) DEVICE — GLV SURG SENSICARE PI ORTHO SZ8.5 LF STRL

## (undated) DEVICE — PK ENT HD AND NK 30

## (undated) DEVICE — 4-PORT MANIFOLD: Brand: NEPTUNE 2

## (undated) DEVICE — SHEATH INTRO MICRO 7F 11CM

## (undated) DEVICE — DISPOSABLE TOURNIQUET CUFF 34"X4", 1-LINE, BLUE, STERILE, 1EA/PK, 10PK/CS: Brand: ASP MEDICAL

## (undated) DEVICE — SUT VIC 4/0 RB1 27IN VCP214H

## (undated) DEVICE — CVR PROB GEN PURP W ISOSILK 6X48

## (undated) DEVICE — LP VESL MAXI 2.5X1MM RED 2PK

## (undated) DEVICE — BAPTIST TURNOVER KIT: Brand: MEDLINE INDUSTRIES, INC.

## (undated) DEVICE — SYS MIX CLEARMIX SGL/DBL VAC

## (undated) DEVICE — PK TURNOVER RM ADV

## (undated) DEVICE — SOLUTION IRRIG 3000ML 0.9% SOD CHL USP UROMATIC PLAS CONT

## (undated) DEVICE — APPL CHLORAPREP W/TINT 26ML ORNG

## (undated) DEVICE — ST TB EXT STANDARDBORE 30IN

## (undated) DEVICE — KT ANTI FOG W/FLD AND SPNG

## (undated) DEVICE — CVR BRD ARM 13X30

## (undated) DEVICE — BNDG ELAS ECON W/CLIP 4IN 5YD LF STRL

## (undated) DEVICE — 3M™ IOBAN™ 2 ANTIMICROBIAL INCISE DRAPE 6651EZ: Brand: IOBAN™ 2

## (undated) DEVICE — SYR LL TP 10ML STRL

## (undated) DEVICE — GLV SURG SENSICARE W/ALOE PF LF 7.5 STRL

## (undated) DEVICE — SHEET,DRAPE,53X77,STERILE: Brand: MEDLINE

## (undated) DEVICE — SPONGE,DISSECTOR,K,XRAY,9/16"X1/4",STRL: Brand: MEDLINE

## (undated) DEVICE — PACK ANT HIP CDS

## (undated) DEVICE — DRSNG BRDR MEPILEX FLX/LITE FM 4X5CM

## (undated) DEVICE — ADHESIVE SKIN CLOSURE WND 8.661X1.5 IN 22 CM LIQUIBAND SECUR

## (undated) DEVICE — SUTURE VCRL SZ 0 L27IN ABSRB UD L36MM CT-1 1/2 CIR J260H

## (undated) DEVICE — BNDG ELAS W/CLIP 6IN 10YD LF STRL

## (undated) DEVICE — GLOVE SURG SZ 85 L12IN FNGR ORTHO 126MIL CRM LTX FREE

## (undated) DEVICE — MARKER,SKIN,WI/RULER AND LABELS: Brand: MEDLINE

## (undated) DEVICE — SUTURE VCRL SZ 2-0 L36IN ABSRB UD L36MM CT-1 1/2 CIR J945H

## (undated) DEVICE — SPNG GZ STRL 2S 4X4 12PLY

## (undated) DEVICE — INTENDED FOR TISSUE SEPARATION, AND OTHER PROCEDURES THAT REQUIRE A SHARP SURGICAL BLADE TO PUNCTURE OR CUT.: Brand: BARD-PARKER ® STAINLESS STEEL BLADES

## (undated) DEVICE — SOLUTION IV 250ML 0.9% SOD CHL PH 5 INJ USP VIAFLX PLAS

## (undated) DEVICE — STERILE PATIENT PROTECTIVE PAD FOR IMP® KNEE POSITIONERS & COHESIVE WRAP (10 / CASE): Brand: DE MAYO KNEE POSITIONER®

## (undated) DEVICE — NDL SPINE 20G 3 1/2 YEL STRL 1P/U

## (undated) DEVICE — SURGICAL SUCTION CONNECTING TUBE WITH MALE CONNECTOR AND SUCTION CLAMP, 2 FT. LONG (.6 M), 5 MM I.D.: Brand: CONMED

## (undated) DEVICE — GLV SURG BIOGEL M LTX PF 7 1/2

## (undated) DEVICE — SHORT LENS-STERILE

## (undated) DEVICE — ADHS LIQ MASTISOL 2/3ML

## (undated) DEVICE — TRAP FLD MINIVAC MEGADYNE 100ML

## (undated) DEVICE — SYS CLS SKIN PREMIERPRO EXOFINFUSION 22CM

## (undated) DEVICE — Device: Brand: MEDEX

## (undated) DEVICE — CATHETER 8591-38 PASSER,38CM

## (undated) DEVICE — GLOVE SURG SZ 85 L12IN FNGR THK79MIL GRN LTX FREE

## (undated) DEVICE — CATH VASC RF CLOSUREFAST 7CM 7F100CM

## (undated) DEVICE — CONTRL RMT EXT IPG INSPIRE PROGR SLEEP/RESP

## (undated) DEVICE — SOLUTION IV IRRIG POUR BRL 0.9% SODIUM CHL 2F7124

## (undated) DEVICE — INTENDED TO SUPPORT AND MAINTAIN THE POSITION OF AN ANESTHETIZED PATIENT DURING SURGERY: Brand: HERMANTOR XL PINK KNEE POSITIONING PAD

## (undated) DEVICE — DUAL CUT SAGITTAL BLADE

## (undated) DEVICE — STERILE (14X122CM) TELESCOPICALLY-FOLDED COVER: Brand: CIV-CLEAR™ TRANSDUCER COVER

## (undated) DEVICE — TUBE ET 7MM NSL ORAL BASIC CUF INTMED MURPHY EYE RADPQ MRK

## (undated) DEVICE — BIPOLAR SEALER 23-112-1 AQM 6.0: Brand: AQUAMANTYS ®

## (undated) DEVICE — BNDG ADHS CURAD FLX/FABRC 1X3IN STRL LF

## (undated) DEVICE — LARYNGOSCOPE BLDE MAC HNDL M SZ 35 ST CURAPLEX CURAVIEW LED

## (undated) DEVICE — HARMONIC FOCUS SHEARS 9CM LENGTH + ADAPTIVE TISSUE TECHNOLOGY FOR USE WITH BLUE HAND PIECE ONLY: Brand: HARMONIC FOCUS

## (undated) DEVICE — E-Z CLEAN, NON-STICK, PTFE COATED, ELECTROSURGICAL BLADE ELECTRODE, 6.5 INCH (16.5 CM): Brand: MEGADYNE

## (undated) DEVICE — 1010 S-DRAPE TOWEL DRAPE 10/BX: Brand: STERI-DRAPE™

## (undated) DEVICE — STERILE (14 X 61CM) FLAT-FOLDED COVER: Brand: CIV-FLEX™ TRANSDUCER COVER

## (undated) DEVICE — BLD BEAVR MINI GEN SZ15 ORNG

## (undated) DEVICE — TOWEL,OR,DSP,ST,BLUE,DLX,10/PK,8PK/CS: Brand: MEDLINE

## (undated) DEVICE — GEL ULTRASND HI VISC 20G PACKET STRL

## (undated) DEVICE — 3M™ STERI-STRIP™ REINFORCED ADHESIVE SKIN CLOSURES, R1547, 1/2 IN X 4 IN (12 MM X 100 MM), 6 STRIPS/ENVELOPE: Brand: 3M™ STERI-STRIP™

## (undated) DEVICE — SUT ETHLN 5/0 P3 18IN 698H

## (undated) DEVICE — 3M™ STERI-DRAPE™ FLUOROSCOPE DRAPE, 10 PER CARTON / 4 CARTONS PER CASE, 1012: Brand: STERI-DRAPE™

## (undated) DEVICE — NON-WOVEN ADHESIVE WOUND DRESSING: Brand: PRIMAPORE ADHESIVE DRESSING 30*10CM

## (undated) DEVICE — PAD MINOR UNIVERSAL: Brand: MEDLINE INDUSTRIES, INC.

## (undated) DEVICE — GLV SURG DERMASSURE GRN LF PF 8.5

## (undated) DEVICE — PAD,PREPPING,CUFFED,24X48,7",NONSTERILE: Brand: MEDLINE

## (undated) DEVICE — SUT ETHLN 6/0 PC3 18IN 1866G

## (undated) DEVICE — STRIP CLS WND CURAD MEDI/STRIP HYPOALLERG 0.25X4IN PK/10

## (undated) DEVICE — BANDAGE,GAUZE,BULKEE II,4.5"X4.1YD,STRL: Brand: MEDLINE

## (undated) DEVICE — PENCL SMOKE/EVAC MEGADYNE TELESCP 10FT

## (undated) DEVICE — LIGHT SUCT UNTETHERED SCINTILLANT